# Patient Record
Sex: FEMALE | Race: WHITE | Employment: OTHER | URBAN - METROPOLITAN AREA
[De-identification: names, ages, dates, MRNs, and addresses within clinical notes are randomized per-mention and may not be internally consistent; named-entity substitution may affect disease eponyms.]

---

## 2017-06-05 ENCOUNTER — OFFICE VISIT (OUTPATIENT)
Dept: SURGERY | Facility: CLINIC | Age: 82
End: 2017-06-05
Payer: MEDICARE

## 2017-06-05 VITALS
BODY MASS INDEX: 28.07 KG/M2 | HEART RATE: 87 BPM | DIASTOLIC BLOOD PRESSURE: 64 MMHG | SYSTOLIC BLOOD PRESSURE: 122 MMHG | WEIGHT: 143 LBS | HEIGHT: 60 IN

## 2017-06-05 DIAGNOSIS — K80.50 RECURRENT BILIARY COLIC: Primary | ICD-10-CM

## 2017-06-05 PROCEDURE — 99203 OFFICE O/P NEW LOW 30 MIN: CPT | Performed by: SURGERY

## 2017-06-05 RX ORDER — LOSARTAN POTASSIUM AND HYDROCHLOROTHIAZIDE 12.5; 5 MG/1; MG/1
1 TABLET ORAL EVERY EVENING
Status: ON HOLD | COMMUNITY
End: 2018-01-01

## 2017-06-05 NOTE — MR AVS SNAPSHOT
"              After Visit Summary   2017    Cristal Wynn    MRN: [id number]           Patient Information     Date Of Birth          1935        Visit Information        Provider Department      2017 9:45 AM Tony Crawley MD [facility] General Surgery       Follow-ups after your visit        Who to contact     If you have questions or need follow up information about today's clinic visit or your schedule please contact [facility] directly at [phone].  Normal or non-critical lab and imaging results will be communicated to you by Lumicellhart, letter or phone within 4 business days after the clinic has received the results. If you do not hear from us within 7 days, please contact the clinic through Lumicellhart or phone. If you have a critical or abnormal lab result, we will notify you by phone as soon as possible.  Submit refill requests through Vastrm or call your pharmacy and they will forward the refill request to us. Please allow 3 business days for your refill to be completed.          Additional Information About Your Visit        MyChart Information     Vastrm lets you send messages to your doctor, view your test results, renew your prescriptions, schedule appointments and more. To sign up, go to [email or web address] . Click on \"Log in\" on the left side of the screen, which will take you to the Welcome page. Then click on \"Sign up Now\" on the right side of the page.     You will be asked to enter the access code listed below, as well as some personal information. Please follow the directions to create your username and password.     Your access code is: [email or web address]  Expires: 9/3/2017 10:23 AM     Your access code will  in 90 days. If you need help or a new code, please call your [facility] clinic or [phone].        Care EveryWhere ID     This is your Care EveryWhere ID. This could be used by other organizations to " access your [facility] medical records  [id number]        Your Vitals Were     Pulse Height BMI (Body Mass Index)             87 5' (1.524 m) 27.93 kg/m2          Blood Pressure from Last 3 Encounters:   06/05/17 122/64   12/24/16 109/53   12/09/16 141/82    Weight from Last 3 Encounters:   06/05/17 143 lb (64.9 kg)   12/23/16 140 lb (63.5 kg)   12/09/16 150 lb (68 kg)              Today, you had the following     No orders found for display       Primary Care Provider Office Phone # Fax #    Mora Bang [phone]       [facility] [address]  [facility] [address]        Thank you!     Thank you for choosing [facility]  for your care. Our goal is always to provide you with excellent care. Hearing back from our patients is one way we can continue to improve our services. Please take a few minutes to complete the written survey that you may receive in the mail after your visit with us. Thank you!             Your Updated Medication List - Protect others around you: Learn how to safely use, store and throw away your medicines at www.disposemymeds.org.          This list is accurate as of: 6/5/17 10:23 AM.  Always use your most recent med list.                   Brand Name Dispense Instructions for use    CHILDRENS CHEWABLE MULTI VITS PO      Take 2 tablets by mouth daily (with breakfast)       losartan-hydrochlorothiazide 50-12.5 MG per tablet    HYZAAR     Take 1 tablet by mouth daily       metoprolol 25 MG 24 hr tablet    TOPROL-XL    90 tablet    Take 1 tablet (25 mg) by mouth At Bedtime

## 2017-06-05 NOTE — LETTER
2017      RE:  Cristal Wynn-:  [birth year]    HISTORY OF PRESENT ILLNESS:  Cristal Wynn is a 82 year old female who is seen in consultation at the request of Dr. Bang for evaluation of biliary colic secondary to cholelithiasis.  Now that she has recovered from her diverticular disease and prior to having her orthopedic operation she would like to address her biliary colic. She recalls multiple episodes where oily/fatty meals have brought on abdominal pain, nausea, vomiting, and diarrhea.  We reviewed the ultrasound she had  In the recent past showing gallbladder sludge.     REVIEW OF SYSTEMS:  Constitutional:  Negative for chills, fatigue, fever.  Eyes:  Negative for blurred vision.  ENT:  Negative for hearing problems.  Cardiovascular:  Negative for chest pain, palpitations.  Respiratory:  Negative for cough, dyspnea and hemoptysis.  Gastrointestinal:  See HPI  Musculoskeletal:  Negative for arthralgias, back pain and myalgias.    Vitals: /64  Pulse 87  Ht 5' (1.524 m)  Wt 143 lb (64.9 kg)  BMI 27.93 kg/m2  BMI= Body mass index is 27.93 kg/(m^2).     EXAM:  GENERAL: appears her age, alert and no distress   HEENT: moist mucus membranes, no scleral icterus  CARDIOVASCULAR:  RRR, No JVD  RESPIRATORY: non labored breathing  NECK: Neck supple. No noticeable masses.  ABDOMEN: soft, nontender, nondistended  Extremities: warm and well perfused, walks with evident discomfort to her knee.  SKIN: No suspicious lesions or rashes  LYMPH: Normal cervical lymph nodes     LABS/Imaging: Gallbladder ultrasound reviewed     ASSESSMENT:  Cristal Wynn suffers from Biliary colic secondary to cholelithiasis.     PLAN:  Laparoscopic cholecystectomy  Cristal Wynn understands the risk, benefits, hopeful outcomes, and possible complications, both in the short and in the long term.  All her questions answered, she will like to proceed with the propose procedure in the near future.     It is my pleasure  to participate in the care of Cristal Wynn. Thank you for this consultation.      If you have any questions please give me a call.     Best regards,    Tony Crawley MD

## 2017-06-06 NOTE — PROGRESS NOTES
[facility] General Surgery Clinic Consultation    CHIEF COMPLAINT:  Chief Complaint   Patient presents with     Consult     Gallbladder        HISTORY OF PRESENT ILLNESS:  Cristal Wynn is a 82 year old female who is seen in consultation at the request of Dr. Bang for evaluation of biliary colic secondary to cholelithiasis.  Now that she has recovered from her diverticular disease and prior to having her orthopedic operation she would like to address her biliary colic.  She recalls multiple episodes where oily/fatty meals have brought on abdominal pain, nausea, vomiting, and diarrhea.  We reviewed the ultrasound she had  In the recent past showing gallbladder sludge.    REVIEW OF SYSTEMS:  Constitutional:  Negative for chills, fatigue, fever.  Eyes:  Negative for blurred vision.  ENT:  Negative for hearing problems.  Cardiovascular:  Negative for chest pain, palpitations.  Respiratory:  Negative for cough, dyspnea and hemoptysis.  Gastrointestinal:  See HPI  Musculoskeletal:  Negative for arthralgias, back pain and myalgias.    Past Medical History:   Diagnosis Date     Cataracts, bilateral      Coronary artery disease 11/18/14    mild non obstructive-CTa     Disorder of bone and cartilage, unspecified      Family history of heart disease      Gastro-oesophageal reflux disease      Hyperlipidaemia      Hypertension      Osteoarthrosis, unspecified whether generalized or localized, lower leg     knee     Unspecified vitamin B deficiency        Past Surgical History:   Procedure Laterality Date     COSMETIC SURGERY       GYN SURGERY      Hysterectomy     HEAD & NECK SURGERY       ORTHOPEDIC SURGERY      hip joint replacement     PHACOEMULSIFICATION CLEAR CORNEA WITH STANDARD INTRAOCULAR LENS IMPLANT Right 10/23/2014    Procedure: PHACOEMULSIFICATION CLEAR CORNEA WITH STANDARD INTRAOCULAR LENS IMPLANT;  Surgeon: Ivan Avalos MD;  Location: [facility]     PHACOEMULSIFICATION CLEAR CORNEA WITH STANDARD INTRAOCULAR  LENS IMPLANT Left 10/30/2014    Procedure: PHACOEMULSIFICATION CLEAR CORNEA WITH STANDARD INTRAOCULAR LENS IMPLANT;  Surgeon: Ivan Avalos MD;  Location: [facility]       Family History   Problem Relation Age of Onset     Prostate Cancer Father      Alcohol/Drug Brother      1 brother  ETOH/CHF     Heart Failure Brother      HEART DISEASE Brother      Other - See Comments Sister      lyphoma no recurrence     CANCER Sister      Helicobacter Pylori Brother      Obesity Brother      C.A.D. Sister      CABG/?valve at 80       Social History   Substance Use Topics     Smoking status: Never Smoker     Smokeless tobacco: Not on file     Alcohol use Yes      Comment: wine once a month       Patient Active Problem List   Diagnosis     Nonspecific abnormal electrocardiogram (ECG) (EKG)     Hypertension     Hyperlipidemia     Family history of heart disease     Coronary artery disease     Acute diverticulitis       Allergies   Allergen Reactions     Excedrin Back & [Acetaminophen-Aspirin Buffered]      Abdominal pain       Current Outpatient Prescriptions   Medication Sig Dispense Refill     losartan-hydrochlorothiazide (HYZAAR) 50-12.5 MG per tablet Take 1 tablet by mouth daily       metoprolol (TOPROL-XL) 25 MG 24 hr tablet Take 1 tablet (25 mg) by mouth At Bedtime 90 tablet 3     Pediatric Multiple Vit-C-FA (CHILDRENS CHEWABLE MULTI VITS PO) Take 2 tablets by mouth daily (with breakfast)         Vitals: /64  Pulse 87  Ht 5' (1.524 m)  Wt 143 lb (64.9 kg)  BMI 27.93 kg/m2  BMI= Body mass index is 27.93 kg/(m^2).    EXAM:  GENERAL: appears her age, alert and no distress   HEENT: moist mucus membranes, no scleral icterus  CARDIOVASCULAR:  RRR, No JVD  RESPIRATORY: non labored breathing  NECK: Neck supple. No noticeable masses.  ABDOMEN: soft, nontender, nondistended  Extremities: warm and well perfused, walks with evident discomfort to her knee.  SKIN: No suspicious lesions or rashes  LYMPH: Normal cervical  lymph nodes    LABS/Imaging: Gallbladder ultrasound reviewed    ASSESSMENT:  Cristal Wynn suffers from Biliary colic secondary to cholelithiasis.    PLAN:  Laparoscopic cholecystectomy  Cristal Wynn understands the risk, benefits, hopeful outcomes, and possible complications, both in the short and in the long term.  All her questions answered, she will like to proceed with the propose procedure in the near future.    It is my pleasure to participate in the care of Cristal Wynn. Thank you for this consultation.     If you have any questions please give me a call.    Best regards,  Tony Crawley MD    Please route or send letter to:  Primary Care Provider (PCP), Referring Provider and Include Progress Note    Total time with patient visit: 30 minutes more than half spent in counseling, explanation of procedures and coordination of care.

## 2017-06-23 ENCOUNTER — TRANSFERRED RECORDS (OUTPATIENT)
Dept: HEALTH INFORMATION MANAGEMENT | Facility: CLINIC | Age: 82
End: 2017-06-23

## 2017-06-27 ENCOUNTER — ANESTHESIA EVENT (OUTPATIENT)
Dept: SURGERY | Facility: CLINIC | Age: 82
End: 2017-06-27
Payer: MEDICARE

## 2017-06-27 ENCOUNTER — SURGERY (OUTPATIENT)
Age: 82
End: 2017-06-27

## 2017-06-27 ENCOUNTER — HOSPITAL ENCOUNTER (OUTPATIENT)
Facility: CLINIC | Age: 82
Discharge: HOME | End: 2017-06-27
Attending: SURGERY | Admitting: SURGERY
Payer: MEDICARE

## 2017-06-27 ENCOUNTER — ANESTHESIA (OUTPATIENT)
Dept: SURGERY | Facility: CLINIC | Age: 82
End: 2017-06-27
Payer: MEDICARE

## 2017-06-27 ENCOUNTER — APPOINTMENT (OUTPATIENT)
Dept: SURGERY | Facility: PHYSICIAN GROUP | Age: 82
End: 2017-06-27
Payer: MEDICARE

## 2017-06-27 VITALS
RESPIRATION RATE: 16 BRPM | BODY MASS INDEX: 28.87 KG/M2 | HEIGHT: 60 IN | TEMPERATURE: 99 F | WEIGHT: 147.06 LBS | OXYGEN SATURATION: 97 % | SYSTOLIC BLOOD PRESSURE: 145 MMHG | DIASTOLIC BLOOD PRESSURE: 78 MMHG

## 2017-06-27 DIAGNOSIS — G89.18 POST-OPERATIVE PAIN: Primary | ICD-10-CM

## 2017-06-27 PROCEDURE — 27210995 ZZH RX 272: Performed by: SURGERY

## 2017-06-27 PROCEDURE — 25000132 ZZH RX MED GY IP 250 OP 250 PS 637: Mod: GY | Performed by: SURGERY

## 2017-06-27 PROCEDURE — 37000008 ZZH ANESTHESIA TECHNICAL FEE, 1ST 30 MIN: Performed by: SURGERY

## 2017-06-27 PROCEDURE — A9270 NON-COVERED ITEM OR SERVICE: HCPCS | Mod: GY | Performed by: SURGERY

## 2017-06-27 PROCEDURE — 88304 TISSUE EXAM BY PATHOLOGIST: CPT | Performed by: SURGERY

## 2017-06-27 PROCEDURE — 27210794 ZZH OR GENERAL SUPPLY STERILE: Performed by: SURGERY

## 2017-06-27 PROCEDURE — 71000027 ZZH RECOVERY PHASE 2 EACH 15 MINS: Performed by: SURGERY

## 2017-06-27 PROCEDURE — 25000128 H RX IP 250 OP 636: Performed by: NURSE ANESTHETIST, CERTIFIED REGISTERED

## 2017-06-27 PROCEDURE — 36000058 ZZH SURGERY LEVEL 3 EA 15 ADDTL MIN: Performed by: SURGERY

## 2017-06-27 PROCEDURE — 88304 TISSUE EXAM BY PATHOLOGIST: CPT | Mod: 26 | Performed by: SURGERY

## 2017-06-27 PROCEDURE — 25000125 ZZHC RX 250: Performed by: SURGERY

## 2017-06-27 PROCEDURE — 47562 LAPAROSCOPIC CHOLECYSTECTOMY: CPT | Performed by: SURGERY

## 2017-06-27 PROCEDURE — 71000013 ZZH RECOVERY PHASE 1 LEVEL 1 EA ADDTL HR: Performed by: SURGERY

## 2017-06-27 PROCEDURE — 47562 LAPAROSCOPIC CHOLECYSTECTOMY: CPT | Mod: AS | Performed by: PHYSICIAN ASSISTANT

## 2017-06-27 PROCEDURE — 25000125 ZZHC RX 250: Performed by: NURSE ANESTHETIST, CERTIFIED REGISTERED

## 2017-06-27 PROCEDURE — 25000128 H RX IP 250 OP 636: Performed by: ANESTHESIOLOGY

## 2017-06-27 PROCEDURE — 25800025 ZZH RX 258: Performed by: SURGERY

## 2017-06-27 PROCEDURE — 37000009 ZZH ANESTHESIA TECHNICAL FEE, EACH ADDTL 15 MIN: Performed by: SURGERY

## 2017-06-27 PROCEDURE — 40000170 ZZH STATISTIC PRE-PROCEDURE ASSESSMENT II: Performed by: SURGERY

## 2017-06-27 PROCEDURE — 71000012 ZZH RECOVERY PHASE 1 LEVEL 1 FIRST HR: Performed by: SURGERY

## 2017-06-27 PROCEDURE — 36000056 ZZH SURGERY LEVEL 3 1ST 30 MIN: Performed by: SURGERY

## 2017-06-27 RX ORDER — MEPERIDINE HYDROCHLORIDE 25 MG/ML
12.5 INJECTION INTRAMUSCULAR; INTRAVENOUS; SUBCUTANEOUS
Status: DISCONTINUED | OUTPATIENT
Start: 2017-06-27 | End: 2017-06-27 | Stop reason: HOSPADM

## 2017-06-27 RX ORDER — BUPIVACAINE HYDROCHLORIDE AND EPINEPHRINE 2.5; 5 MG/ML; UG/ML
INJECTION, SOLUTION EPIDURAL; INFILTRATION; INTRACAUDAL; PERINEURAL
Status: DISCONTINUED
Start: 2017-06-27 | End: 2017-06-27 | Stop reason: HOSPADM

## 2017-06-27 RX ORDER — DEXAMETHASONE SODIUM PHOSPHATE 4 MG/ML
INJECTION, SOLUTION INTRA-ARTICULAR; INTRALESIONAL; INTRAMUSCULAR; INTRAVENOUS; SOFT TISSUE PRN
Status: DISCONTINUED | OUTPATIENT
Start: 2017-06-27 | End: 2017-06-27

## 2017-06-27 RX ORDER — ONDANSETRON 4 MG/1
4 TABLET, ORALLY DISINTEGRATING ORAL EVERY 30 MIN PRN
Status: DISCONTINUED | OUTPATIENT
Start: 2017-06-27 | End: 2017-06-27 | Stop reason: HOSPADM

## 2017-06-27 RX ORDER — PROPOFOL 10 MG/ML
INJECTION, EMULSION INTRAVENOUS CONTINUOUS PRN
Status: DISCONTINUED | OUTPATIENT
Start: 2017-06-27 | End: 2017-06-27

## 2017-06-27 RX ORDER — NALOXONE HYDROCHLORIDE 0.4 MG/ML
.1-.4 INJECTION, SOLUTION INTRAMUSCULAR; INTRAVENOUS; SUBCUTANEOUS
Status: DISCONTINUED | OUTPATIENT
Start: 2017-06-27 | End: 2017-06-27 | Stop reason: HOSPADM

## 2017-06-27 RX ORDER — SODIUM CHLORIDE, SODIUM LACTATE, POTASSIUM CHLORIDE, CALCIUM CHLORIDE 600; 310; 30; 20 MG/100ML; MG/100ML; MG/100ML; MG/100ML
INJECTION, SOLUTION INTRAVENOUS CONTINUOUS PRN
Status: DISCONTINUED | OUTPATIENT
Start: 2017-06-27 | End: 2017-06-27

## 2017-06-27 RX ORDER — ONDANSETRON 2 MG/ML
INJECTION INTRAMUSCULAR; INTRAVENOUS PRN
Status: DISCONTINUED | OUTPATIENT
Start: 2017-06-27 | End: 2017-06-27

## 2017-06-27 RX ORDER — CEFAZOLIN SODIUM 2 G/100ML
2 INJECTION, SOLUTION INTRAVENOUS
Status: DISCONTINUED | OUTPATIENT
Start: 2017-06-27 | End: 2017-06-27 | Stop reason: HOSPADM

## 2017-06-27 RX ORDER — HYDROCODONE BITARTRATE AND ACETAMINOPHEN 5; 325 MG/1; MG/1
1-2 TABLET ORAL
Status: DISCONTINUED | OUTPATIENT
Start: 2017-06-27 | End: 2017-06-27 | Stop reason: HOSPADM

## 2017-06-27 RX ORDER — LIDOCAINE HYDROCHLORIDE 10 MG/ML
INJECTION, SOLUTION INFILTRATION; PERINEURAL
Status: DISCONTINUED
Start: 2017-06-27 | End: 2017-06-27 | Stop reason: HOSPADM

## 2017-06-27 RX ORDER — FENTANYL CITRATE 50 UG/ML
INJECTION, SOLUTION INTRAMUSCULAR; INTRAVENOUS PRN
Status: DISCONTINUED | OUTPATIENT
Start: 2017-06-27 | End: 2017-06-27

## 2017-06-27 RX ORDER — NEOSTIGMINE METHYLSULFATE 1 MG/ML
VIAL (ML) INJECTION PRN
Status: DISCONTINUED | OUTPATIENT
Start: 2017-06-27 | End: 2017-06-27

## 2017-06-27 RX ORDER — FENTANYL CITRATE 50 UG/ML
25-50 INJECTION, SOLUTION INTRAMUSCULAR; INTRAVENOUS EVERY 5 MIN PRN
Status: DISCONTINUED | OUTPATIENT
Start: 2017-06-27 | End: 2017-06-27 | Stop reason: HOSPADM

## 2017-06-27 RX ORDER — HYDROCODONE BITARTRATE AND ACETAMINOPHEN 5; 325 MG/1; MG/1
1 TABLET ORAL ONCE
Status: COMPLETED | OUTPATIENT
Start: 2017-06-27 | End: 2017-06-27

## 2017-06-27 RX ORDER — HYDROCODONE BITARTRATE AND ACETAMINOPHEN 5; 325 MG/1; MG/1
1-2 TABLET ORAL EVERY 4 HOURS PRN
Qty: 30 TABLET | Refills: 0 | Status: ON HOLD | OUTPATIENT
Start: 2017-06-27 | End: 2017-09-14

## 2017-06-27 RX ORDER — MAGNESIUM HYDROXIDE 1200 MG/15ML
LIQUID ORAL PRN
Status: DISCONTINUED | OUTPATIENT
Start: 2017-06-27 | End: 2017-06-27 | Stop reason: HOSPADM

## 2017-06-27 RX ORDER — GLYCOPYRROLATE 0.2 MG/ML
INJECTION, SOLUTION INTRAMUSCULAR; INTRAVENOUS PRN
Status: DISCONTINUED | OUTPATIENT
Start: 2017-06-27 | End: 2017-06-27

## 2017-06-27 RX ORDER — SODIUM CHLORIDE, SODIUM LACTATE, POTASSIUM CHLORIDE, CALCIUM CHLORIDE 600; 310; 30; 20 MG/100ML; MG/100ML; MG/100ML; MG/100ML
INJECTION, SOLUTION INTRAVENOUS CONTINUOUS
Status: DISCONTINUED | OUTPATIENT
Start: 2017-06-27 | End: 2017-06-27 | Stop reason: HOSPADM

## 2017-06-27 RX ORDER — PROPOFOL 10 MG/ML
INJECTION, EMULSION INTRAVENOUS PRN
Status: DISCONTINUED | OUTPATIENT
Start: 2017-06-27 | End: 2017-06-27

## 2017-06-27 RX ORDER — ONDANSETRON 2 MG/ML
4 INJECTION INTRAMUSCULAR; INTRAVENOUS EVERY 30 MIN PRN
Status: DISCONTINUED | OUTPATIENT
Start: 2017-06-27 | End: 2017-06-27 | Stop reason: HOSPADM

## 2017-06-27 RX ORDER — FENTANYL CITRATE 50 UG/ML
25-50 INJECTION, SOLUTION INTRAMUSCULAR; INTRAVENOUS
Status: DISCONTINUED | OUTPATIENT
Start: 2017-06-27 | End: 2017-06-27 | Stop reason: HOSPADM

## 2017-06-27 RX ORDER — LIDOCAINE HYDROCHLORIDE 20 MG/ML
INJECTION, SOLUTION INFILTRATION; PERINEURAL PRN
Status: DISCONTINUED | OUTPATIENT
Start: 2017-06-27 | End: 2017-06-27

## 2017-06-27 RX ORDER — CEFAZOLIN SODIUM 1 G/3ML
1 INJECTION, POWDER, FOR SOLUTION INTRAMUSCULAR; INTRAVENOUS SEE ADMIN INSTRUCTIONS
Status: DISCONTINUED | OUTPATIENT
Start: 2017-06-27 | End: 2017-06-27 | Stop reason: HOSPADM

## 2017-06-27 RX ADMIN — ONDANSETRON 4 MG: 2 INJECTION INTRAMUSCULAR; INTRAVENOUS at 12:15

## 2017-06-27 RX ADMIN — PHENYLEPHRINE HYDROCHLORIDE 150 MCG: 10 INJECTION, SOLUTION INTRAMUSCULAR; INTRAVENOUS; SUBCUTANEOUS at 11:48

## 2017-06-27 RX ADMIN — SODIUM CHLORIDE 1000 ML: 0.9 IRRIGANT IRRIGATION at 12:19

## 2017-06-27 RX ADMIN — FENTANYL CITRATE 25 MCG: 50 INJECTION, SOLUTION INTRAMUSCULAR; INTRAVENOUS at 12:41

## 2017-06-27 RX ADMIN — LIDOCAINE HYDROCHLORIDE 30 ML: 10 INJECTION, SOLUTION INFILTRATION; PERINEURAL at 11:51

## 2017-06-27 RX ADMIN — MIDAZOLAM HYDROCHLORIDE 1 MG: 1 INJECTION, SOLUTION INTRAMUSCULAR; INTRAVENOUS at 11:27

## 2017-06-27 RX ADMIN — FENTANYL CITRATE 50 MCG: 50 INJECTION, SOLUTION INTRAMUSCULAR; INTRAVENOUS at 11:29

## 2017-06-27 RX ADMIN — ROCURONIUM BROMIDE 25 MG: 10 INJECTION INTRAVENOUS at 11:29

## 2017-06-27 RX ADMIN — SODIUM CHLORIDE, POTASSIUM CHLORIDE, SODIUM LACTATE AND CALCIUM CHLORIDE: 600; 310; 30; 20 INJECTION, SOLUTION INTRAVENOUS at 11:27

## 2017-06-27 RX ADMIN — ROCURONIUM BROMIDE 25 MG: 10 INJECTION INTRAVENOUS at 11:52

## 2017-06-27 RX ADMIN — HYDROCODONE BITARTRATE AND ACETAMINOPHEN 1 TABLET: 5; 325 TABLET ORAL at 13:59

## 2017-06-27 RX ADMIN — PHENYLEPHRINE HYDROCHLORIDE 150 MCG: 10 INJECTION, SOLUTION INTRAMUSCULAR; INTRAVENOUS; SUBCUTANEOUS at 11:58

## 2017-06-27 RX ADMIN — NEOSTIGMINE METHYLSULFATE 3 MG: 1 INJECTION INTRAMUSCULAR; INTRAVENOUS; SUBCUTANEOUS at 12:18

## 2017-06-27 RX ADMIN — SODIUM CHLORIDE 300 ML: 900 IRRIGANT IRRIGATION at 12:10

## 2017-06-27 RX ADMIN — PROPOFOL 200 MCG/KG/MIN: 10 INJECTION, EMULSION INTRAVENOUS at 11:29

## 2017-06-27 RX ADMIN — DEXAMETHASONE SODIUM PHOSPHATE 4 MG: 4 INJECTION, SOLUTION INTRA-ARTICULAR; INTRALESIONAL; INTRAMUSCULAR; INTRAVENOUS; SOFT TISSUE at 11:41

## 2017-06-27 RX ADMIN — FENTANYL CITRATE 25 MCG: 50 INJECTION, SOLUTION INTRAMUSCULAR; INTRAVENOUS at 12:37

## 2017-06-27 RX ADMIN — FENTANYL CITRATE 50 MCG: 50 INJECTION, SOLUTION INTRAMUSCULAR; INTRAVENOUS at 12:48

## 2017-06-27 RX ADMIN — LIDOCAINE HYDROCHLORIDE 60 MG: 20 INJECTION, SOLUTION INFILTRATION; PERINEURAL at 11:29

## 2017-06-27 RX ADMIN — PROPOFOL 150 MG: 10 INJECTION, EMULSION INTRAVENOUS at 11:29

## 2017-06-27 RX ADMIN — FENTANYL CITRATE 50 MCG: 50 INJECTION, SOLUTION INTRAMUSCULAR; INTRAVENOUS at 11:35

## 2017-06-27 RX ADMIN — PHENYLEPHRINE HYDROCHLORIDE 150 MCG: 10 INJECTION, SOLUTION INTRAMUSCULAR; INTRAVENOUS; SUBCUTANEOUS at 11:42

## 2017-06-27 RX ADMIN — GLYCOPYRROLATE 0.6 MG: 0.2 INJECTION, SOLUTION INTRAMUSCULAR; INTRAVENOUS at 12:18

## 2017-06-27 NOTE — LETTER
[facility] SAME DAY SURGERY  [address] [place] [address]  [phone]          June 27, 2017    RE:  Cristal Wynn                                                                                                                                                                 To whom it may concern:    Cristal Wynn underwent surgery at [facility] on 6/27/17. Please excuse her from all work-related activities for at least 4 weeks. She should be able to return to work on Wednesday 7/26/17. If she will need further time off from work we would be happy to provide you with another letter.      Sincerely,            KAVIN Kennedy MD  [facility]  [phone]

## 2017-06-27 NOTE — ANESTHESIA CARE TRANSFER NOTE
Patient: Cristal Wynn    Procedure(s):  LAPAROSCOPIC CHOLECYSTECTOMY - Wound Class: II-Clean Contaminated    Diagnosis: BILIARY COLIC  Diagnosis Additional Information: No value filed.    Anesthesia Type:   General, ETT     Note:  Airway :Face Mask  Patient transferred to:PACU  Comments: Neuromuscular blockade reversed after TOF 2/4, spontaneous respirations, adequate tidal volumes, followed commands to voice, extubated atraumatically, extubated with suction, airway patent after extubation.  Oxygen via facemask at 6 liters per minute to PACU. Oxygen tubing connected to wall O2 in PACU, SpO2, NiBP, and EKG monitors and alarms on and functioning, Harry Hugger warmer connected to patient gown, report on patient's clinical status given to PACU RN , RN questions answered.       Vitals: (Last set prior to Anesthesia Care Transfer)    CRNA VITALS  6/27/2017 1155 - 6/27/2017 1229      6/27/2017             NIBP: (!)  164/95    Pulse: 90    NIBP Mean: 137    SpO2: 97 %    Resp Rate (set): 10                Electronically Signed By: YARELY Hilario CRNA  June 27, 2017  12:29 PM

## 2017-06-27 NOTE — OP NOTE
Surgeon: Phil Weber MD  1st Assistant: Brianna Mohamud PA-C, The physicians assistant was medically necessary for their expertise in camera management, suctioning, suturing, and retraction.  PREOPERATIVE DIAGNOSIS: chronic cholecystitis.   POSTOPERATIVE DIAGNOSES: Same  PROCEDURE: Laparoscopic cholecystectomy.   ANESTHESIA: General.   ESTIMATED BLOOD LOSS: Less than 5 mL.   OPERATIVE PROCEDURE: After induction of general endotracheal anesthesia, Cristal Montes Wynn abdomen was prepped and draped in the usual sterile fashion. With the Mohit technique in an periumbilical location, the abdomen was entered and pneumoperitoneum was established. Three additional 5 mm trocars were placed along the right hypochondrium. Some adhesions from the omentum to the gallbladder were taken down with cautery and blunt dissection.  The gallbladder fundus was retracted cephalad and lateral and the infundibulum was retracted caudad and lateral. The peritoneum investing the triangle of Calot was incised with electrocautery and dissected bluntly. The critical view of a single cystic duct, single cystic artery was achieved and both structures were doubly clipped on the patient's side, singly clipped on the gallbladder side and transected sharply. The gallbladder was detached from the liver with electrocautery and blunt dissection. The specimen was removed from the patient's abdomen and sent to pathology. The gallbladder fossa was inspected. Hemostasis was secured, and no evidence of bile leakage noted. The abdomen was surveyed and no other pathology seen. The trocars were then removed under direct visualization without evidence of bleeding. Pneumoperitoneum was released, and the fascia of the periumbilical port was closed with multiple interrupted 0 Vicryl suture. Skin was approximated with 4-0 Monocryl. Steri-Strips and sterile dressing applied. No immediate complications.   PHIL WEBER MD

## 2017-06-27 NOTE — DISCHARGE INSTRUCTIONS
[facility]   Discharge Instructions for Laparoscopic Cholecystectomy              ACTIVITY    You may climb stairs.    You may lift or exercise after four days if comfortable.    You may drive without restrictions when not using prescription pain medication.    You may return to work in four days when comfortable.    BATHING    You may take a tub bath or shower.      You may have steri-strips (looks like tape) on your incision.  They will fall off by themselves.     DIET    Return to the diet you were on before surgery.    CALL YOUR PHYSICIAN IF YOU HAVE    Chills or fever above 101   F.    Drainage from the incisions    Significant bleeding    Pain not relieved by your pain medication or rest.    Increasing pain after the first 48 hours    HELPFUL HINTS    It is not uncommon to experience some loose stools or diarrhea after surgery.  This is your body s way of adapting to the bile which will slowly drain into your intestine.    You may experience shoulder pain which is due to the air injected during the procedure.  This is temporary and usually passes in a day.                   Same Day Surgery Discharge Instructions for  Sedation and General Anesthesia       It's not unusual to feel dizzy, light-headed or faint for up to 24 hours after surgery or while taking pain medication.  If you have these symptoms: sit for a few minutes before standing and have someone assist you when you get up to walk or use the bathroom.      You should rest and relax for the next 24 hours. We recommend you make arrangements to have an adult stay with you for at least 24 hours after your discharge.  Avoid hazardous and strenuous activity.      DO NOT DRIVE any vehicle or operate mechanical equipment for 24 hours following the end of your surgery.  Even though you may feel normal, your reactions may be affected by the medication you have received.      Do not drink alcoholic beverages for 24 hours following surgery.        Slowly progress to your regular diet as you feel able. It's not unusual to feel nauseated and/or vomit after receiving anesthesia.  If you develop these symptoms, drink clear liquids (apple juice, ginger ale, broth, 7-up, etc. ) until you feel better.  If your nausea and vomiting persists for 24 hours, please notify your surgeon.        All narcotic pain medications, along with inactivity and anesthesia, can cause constipation. Drinking plenty of liquids and increasing fiber intake will help.      For any questions of a medical nature, call your surgeon.      Do not make important decisions for 24 hours.      If you had general anesthesia, you may have a sore throat for a couple of days related to the breathing tube used during surgery.  You may use Cepacol lozenges to help with this discomfort.  If it worsens or if you develop a fever, contact your surgeon.       If you feel your pain is not well managed with the pain medications prescribed by your surgeon, please contact your surgeon's office to let them know so they can address your concerns.       **If you have questions or concerns about your procedure,  call Dr. Crawley at [phone]**

## 2017-06-27 NOTE — IP AVS SNAPSHOT
[facility] Same Day Surgery    [address]    [place] [address]    Phone:  [phone]    Fax:  [phone]                                       After Visit Summary   6/27/2017    Cristal Wynn    MRN: [id number]           After Visit Summary Signature Page     I have received my discharge instructions, and my questions have been answered. I have discussed any challenges I see with this plan with the nurse or doctor.    ..........................................................................................................................................  Patient/Patient Representative Signature      ..........................................................................................................................................  Patient Representative Print Name and Relationship to Patient    ..................................................               ................................................  Date                                            Time    ..........................................................................................................................................  Reviewed by Signature/Title    ...................................................              ..............................................  Date                                                            Time

## 2017-06-27 NOTE — IP AVS SNAPSHOT
MRN:[id number]                      After Visit Summary   6/27/2017    Cristal yWnn    MRN: [id number]           Thank you!     Thank you for choosing [facility] for your care. Our goal is always to provide you with excellent care. Hearing back from our patients is one way we can continue to improve our services. Please take a few minutes to complete the written survey that you may receive in the mail after you visit with us. Thank you!        Patient Information     Date Of Birth          1935        About your hospital stay     You were admitted on:  June 27, 2017 You last received care in the:  [facility]    You were discharged on:  June 27, 2017       Who to Call     For medical emergencies, please call 911.  For non-urgent questions about your medical care, please call your primary care provider or clinic, [phone]  For questions related to your surgery, please call your surgery clinic        Attending Provider     Provider Specialty    Tony Crawley MD Surgery       Primary Care Provider Office Phone # Fax #    Mora Bang [phone]      After Care Instructions     Discharge Instructions       Follow up with Dr. Crawley or PA, at [address] [facility] [address] [address], within 2 weeks, call office for appointment at [phone].    IF you are doing well and have no questions or concerns, you may call our nurse to update on your condition instead of coming in for appointment.                  Further instructions from your care team       [facility]   Discharge Instructions for Laparoscopic Cholecystectomy              ACTIVITY    You may climb stairs.    You may lift or exercise after four days if comfortable.    You may drive without restrictions when not using prescription pain medication.    You may return to work in four days when comfortable.    BATHING    You may take a tub bath or shower.      You may have  steri-strips (looks like tape) on your incision.  They will fall off by themselves.     DIET    Return to the diet you were on before surgery.    CALL YOUR PHYSICIAN IF YOU HAVE    Chills or fever above 101   F.    Drainage from the incisions    Significant bleeding    Pain not relieved by your pain medication or rest.    Increasing pain after the first 48 hours    HELPFUL HINTS    It is not uncommon to experience some loose stools or diarrhea after surgery.  This is your body s way of adapting to the bile which will slowly drain into your intestine.    You may experience shoulder pain which is due to the air injected during the procedure.  This is temporary and usually passes in a day.                   Same Day Surgery Discharge Instructions for  Sedation and General Anesthesia       It's not unusual to feel dizzy, light-headed or faint for up to 24 hours after surgery or while taking pain medication.  If you have these symptoms: sit for a few minutes before standing and have someone assist you when you get up to walk or use the bathroom.      You should rest and relax for the next 24 hours. We recommend you make arrangements to have an adult stay with you for at least 24 hours after your discharge.  Avoid hazardous and strenuous activity.      DO NOT DRIVE any vehicle or operate mechanical equipment for 24 hours following the end of your surgery.  Even though you may feel normal, your reactions may be affected by the medication you have received.      Do not drink alcoholic beverages for 24 hours following surgery.       Slowly progress to your regular diet as you feel able. It's not unusual to feel nauseated and/or vomit after receiving anesthesia.  If you develop these symptoms, drink clear liquids (apple juice, ginger ale, broth, 7-up, etc. ) until you feel better.  If your nausea and vomiting persists for 24 hours, please notify your surgeon.        All narcotic pain medications, along with inactivity and  "anesthesia, can cause constipation. Drinking plenty of liquids and increasing fiber intake will help.      For any questions of a medical nature, call your surgeon.      Do not make important decisions for 24 hours.      If you had general anesthesia, you may have a sore throat for a couple of days related to the breathing tube used during surgery.  You may use Cepacol lozenges to help with this discomfort.  If it worsens or if you develop a fever, contact your surgeon.       If you feel your pain is not well managed with the pain medications prescribed by your surgeon, please contact your surgeon's office to let them know so they can address your concerns.       **If you have questions or concerns about your procedure,  call Dr. Crawley at [phone]**    Pending Results     Date and Time Order Name Status Description    2017 1207 Surgical pathology exam In process             Admission Information     Date & Time Provider Department Dept. Phone    2017 Tony Crawley MD [facility] Same Day Surgery [phone]      Your Vitals Were     Blood Pressure Temperature Respirations Height Weight Pulse Oximetry    133/78 99  F (37.2  C) (Temporal) 16 1.524 m (5') 66.7 kg (147 lb 1 oz) 93%    BMI (Body Mass Index)                   28.72 kg/m2           [employer] Information     [facility] [employer] lets you send messages to your doctor, view your test results, renew your prescriptions, schedule appointments and more. To sign up, go to [email or web address] . Click on \"Log in\" on the left side of the screen, which will take you to the Welcome page. Then click on \"Sign up Now\" on the right side of the page.     You will be asked to enter the access code listed below, as well as some personal information. Please follow the directions to create your username and password.     Your access code is: [email or web address]  Expires: 9/3/2017 10:23 AM     Your access code will  in 90 days. If you need help or a new code, please " call your [facility] clinic or [phone].        Care EveryWhere ID     This is your Care EveryWhere ID. This could be used by other organizations to access your [facility] medical records  [id number]        Equal Access to Services     RUDDY ELDRIDGE: Hadii aad ku hadvalerio Sooliverioali, waaxda luqadaha, qaybta kaalmada adeegyada, ivette gonzaleztrish stormerni jules jia eldridge. So [facility] [phone].    ATENCIÓN: Si habla español, tiene a birch disposición servicios gratuitos de asistencia lingüística. Llame al [phone].    We comply with applicable federal civil rights laws and [place] laws. We do not discriminate on the basis of race, color, national origin, age, disability sex, sexual orientation or gender identity.               Review of your medicines      START taking        Dose / Directions    HYDROcodone-acetaminophen 5-325 MG per tablet   Commonly known as:  NORCO   Used for:  Post-operative pain        Dose:  1-2 tablet   Take 1-2 tablets by mouth every 4 hours as needed for other (Moderate to Severe Pain)   Quantity:  30 tablet   Refills:  0         CONTINUE these medicines which have NOT CHANGED        Dose / Directions    losartan-hydrochlorothiazide 50-12.5 MG per tablet   Commonly known as:  HYZAAR        Dose:  1 tablet   Take 1 tablet by mouth daily   Refills:  0       metoprolol 25 MG 24 hr tablet   Commonly known as:  TOPROL-XL   Used for:  Hypertension        Dose:  25 mg   Take 1 tablet (25 mg) by mouth At Bedtime   Quantity:  90 tablet   Refills:  3            Where to get your medicines      Some of these will need a paper prescription and others can be bought over the counter. Ask your nurse if you have questions.     Bring a paper prescription for each of these medications     HYDROcodone-acetaminophen 5-325 MG per tablet                Protect others around you: Learn how to safely use, store and throw away your medicines at www.disposemymeds.org.             Medication List: This is a list of  all your medications and when to take them. Check marks below indicate your daily home schedule. Keep this list as a reference.      Medications           Morning Afternoon Evening Bedtime As Needed    HYDROcodone-acetaminophen 5-325 MG per tablet   Commonly known as:  NORCO   Take 1-2 tablets by mouth every 4 hours as needed for other (Moderate to Severe Pain)   Last time this was given:  1 tablet on 6/27/2017  1:59 PM                                losartan-hydrochlorothiazide 50-12.5 MG per tablet   Commonly known as:  HYZAAR   Take 1 tablet by mouth daily                                metoprolol 25 MG 24 hr tablet   Commonly known as:  TOPROL-XL   Take 1 tablet (25 mg) by mouth At Bedtime

## 2017-06-27 NOTE — ANESTHESIA POSTPROCEDURE EVALUATION
Patient: Cristal Wynn    Procedure(s):  LAPAROSCOPIC CHOLECYSTECTOMY - Wound Class: II-Clean Contaminated    Diagnosis:BILIARY COLIC  Diagnosis Additional Information: No value filed.    Anesthesia Type:  General, ETT    Note:  Anesthesia Post Evaluation    Patient location during evaluation: PACU  Patient participation: Able to fully participate in evaluation  Level of consciousness: awake  Pain management: adequate  Airway patency: patent  Cardiovascular status: acceptable  Respiratory status: acceptable  Hydration status: acceptable  PONV: none     Anesthetic complications: None          Last vitals:  Vitals:    06/27/17 1230 06/27/17 1245 06/27/17 1300   BP: 143/71 151/74 138/70   Resp: 14 18 12   Temp: 36.3  C (97.4  F)     SpO2: 100% 100% 100%         Electronically Signed By: Nadir Macario MD  June 27, 2017  1:08 PM
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
## 2017-06-28 LAB — COPATH REPORT: NORMAL

## 2017-08-28 ENCOUNTER — HOSPITAL ENCOUNTER (OUTPATIENT)
Dept: LAB | Facility: CLINIC | Age: 82
End: 2017-08-28
Attending: ORTHOPAEDIC SURGERY
Payer: MEDICARE

## 2017-09-14 ENCOUNTER — APPOINTMENT (OUTPATIENT)
Dept: GENERAL RADIOLOGY | Facility: CLINIC | Age: 82
DRG: 470 | End: 2017-09-14
Attending: ORTHOPAEDIC SURGERY
Payer: MEDICARE

## 2017-09-14 ENCOUNTER — ANESTHESIA (OUTPATIENT)
Dept: SURGERY | Facility: CLINIC | Age: 82
DRG: 470 | End: 2017-09-14
Payer: MEDICARE

## 2017-09-14 ENCOUNTER — HOSPITAL ENCOUNTER (INPATIENT)
Facility: CLINIC | Age: 82
LOS: 3 days | Discharge: HOME HEALTH | DRG: 470 | End: 2017-09-17
Attending: ORTHOPAEDIC SURGERY | Admitting: ORTHOPAEDIC SURGERY
Payer: MEDICARE

## 2017-09-14 ENCOUNTER — ANESTHESIA EVENT (OUTPATIENT)
Dept: SURGERY | Facility: CLINIC | Age: 82
DRG: 470 | End: 2017-09-14
Payer: MEDICARE

## 2017-09-14 DIAGNOSIS — Z96.651 STATUS POST TOTAL RIGHT KNEE REPLACEMENT: ICD-10-CM

## 2017-09-14 DIAGNOSIS — M17.11 ARTHRITIS OF KNEE, RIGHT: Primary | ICD-10-CM

## 2017-09-14 LAB
ABO + RH BLD: NORMAL
ABO + RH BLD: NORMAL
BLD GP AB SCN SERPL QL: NORMAL
BLOOD BANK CMNT PATIENT-IMP: NORMAL
CREAT SERPL-MCNC: 0.83 MG/DL (ref 0.52–1.04)
GFR SERPL CREATININE-BSD FRML MDRD: 66 ML/MIN/1.7M2
HGB BLD-MCNC: 12.2 G/DL (ref 11.7–15.7)
PLATELET # BLD AUTO: 310 10E9/L (ref 150–450)
POTASSIUM SERPL-SCNC: 4.3 MMOL/L (ref 3.4–5.3)
SPECIMEN EXP DATE BLD: NORMAL

## 2017-09-14 PROCEDURE — 25000128 H RX IP 250 OP 636: Performed by: ANESTHESIOLOGY

## 2017-09-14 PROCEDURE — 40000986 XR KNEE PORT RT 1/2 VW: Mod: RT

## 2017-09-14 PROCEDURE — A9270 NON-COVERED ITEM OR SERVICE: HCPCS | Mod: GY | Performed by: ANESTHESIOLOGY

## 2017-09-14 PROCEDURE — A9270 NON-COVERED ITEM OR SERVICE: HCPCS | Mod: GY | Performed by: PHYSICIAN ASSISTANT

## 2017-09-14 PROCEDURE — 99207 ZZC NO CHARGE VISIT/PATIENT NOT SEEN: CPT | Performed by: PHYSICIAN ASSISTANT

## 2017-09-14 PROCEDURE — 27211024 ZZHC OR SUPPLY OTHER OPNP: Performed by: ORTHOPAEDIC SURGERY

## 2017-09-14 PROCEDURE — 25000128 H RX IP 250 OP 636: Performed by: PHYSICIAN ASSISTANT

## 2017-09-14 PROCEDURE — 40000170 ZZH STATISTIC PRE-PROCEDURE ASSESSMENT II: Performed by: ORTHOPAEDIC SURGERY

## 2017-09-14 PROCEDURE — 86850 RBC ANTIBODY SCREEN: CPT | Performed by: PHYSICIAN ASSISTANT

## 2017-09-14 PROCEDURE — 25000125 ZZHC RX 250: Performed by: ORTHOPAEDIC SURGERY

## 2017-09-14 PROCEDURE — 12000007 ZZH R&B INTERMEDIATE

## 2017-09-14 PROCEDURE — 25000125 ZZHC RX 250

## 2017-09-14 PROCEDURE — 27210794 ZZH OR GENERAL SUPPLY STERILE: Performed by: ORTHOPAEDIC SURGERY

## 2017-09-14 PROCEDURE — 27110028 ZZH OR GENERAL SUPPLY NON-STERILE: Performed by: ORTHOPAEDIC SURGERY

## 2017-09-14 PROCEDURE — 86901 BLOOD TYPING SEROLOGIC RH(D): CPT | Performed by: PHYSICIAN ASSISTANT

## 2017-09-14 PROCEDURE — 0SRC0J9 REPLACEMENT OF RIGHT KNEE JOINT WITH SYNTHETIC SUBSTITUTE, CEMENTED, OPEN APPROACH: ICD-10-PCS | Performed by: ORTHOPAEDIC SURGERY

## 2017-09-14 PROCEDURE — 25000132 ZZH RX MED GY IP 250 OP 250 PS 637: Mod: GY | Performed by: ORTHOPAEDIC SURGERY

## 2017-09-14 PROCEDURE — 37000009 ZZH ANESTHESIA TECHNICAL FEE, EACH ADDTL 15 MIN: Performed by: ORTHOPAEDIC SURGERY

## 2017-09-14 PROCEDURE — 25000128 H RX IP 250 OP 636

## 2017-09-14 PROCEDURE — 27210995 ZZH RX 272: Performed by: ORTHOPAEDIC SURGERY

## 2017-09-14 PROCEDURE — 85018 HEMOGLOBIN: CPT | Performed by: PHYSICIAN ASSISTANT

## 2017-09-14 PROCEDURE — 25000125 ZZHC RX 250: Performed by: PHYSICIAN ASSISTANT

## 2017-09-14 PROCEDURE — C1776 JOINT DEVICE (IMPLANTABLE): HCPCS | Performed by: ORTHOPAEDIC SURGERY

## 2017-09-14 PROCEDURE — A9270 NON-COVERED ITEM OR SERVICE: HCPCS | Mod: GY | Performed by: ORTHOPAEDIC SURGERY

## 2017-09-14 PROCEDURE — 84132 ASSAY OF SERUM POTASSIUM: CPT | Performed by: PHYSICIAN ASSISTANT

## 2017-09-14 PROCEDURE — 25800025 ZZH RX 258: Performed by: ORTHOPAEDIC SURGERY

## 2017-09-14 PROCEDURE — 25000128 H RX IP 250 OP 636: Performed by: ORTHOPAEDIC SURGERY

## 2017-09-14 PROCEDURE — 25000125 ZZHC RX 250: Performed by: ANESTHESIOLOGY

## 2017-09-14 PROCEDURE — 86900 BLOOD TYPING SEROLOGIC ABO: CPT | Performed by: PHYSICIAN ASSISTANT

## 2017-09-14 PROCEDURE — 71000012 ZZH RECOVERY PHASE 1 LEVEL 1 FIRST HR: Performed by: ORTHOPAEDIC SURGERY

## 2017-09-14 PROCEDURE — S0020 INJECTION, BUPIVICAINE HYDRO: HCPCS | Performed by: ANESTHESIOLOGY

## 2017-09-14 PROCEDURE — 82565 ASSAY OF CREATININE: CPT | Performed by: PHYSICIAN ASSISTANT

## 2017-09-14 PROCEDURE — 36000063 ZZH SURGERY LEVEL 4 EA 15 ADDTL MIN: Performed by: ORTHOPAEDIC SURGERY

## 2017-09-14 PROCEDURE — 25000132 ZZH RX MED GY IP 250 OP 250 PS 637: Mod: GY | Performed by: ANESTHESIOLOGY

## 2017-09-14 PROCEDURE — 36415 COLL VENOUS BLD VENIPUNCTURE: CPT | Performed by: PHYSICIAN ASSISTANT

## 2017-09-14 PROCEDURE — 85049 AUTOMATED PLATELET COUNT: CPT | Performed by: PHYSICIAN ASSISTANT

## 2017-09-14 PROCEDURE — 36000093 ZZH SURGERY LEVEL 4 1ST 30 MIN: Performed by: ORTHOPAEDIC SURGERY

## 2017-09-14 PROCEDURE — 25000132 ZZH RX MED GY IP 250 OP 250 PS 637: Mod: GY | Performed by: PHYSICIAN ASSISTANT

## 2017-09-14 PROCEDURE — 27810169 ZZH OR IMPLANT GENERAL: Performed by: ORTHOPAEDIC SURGERY

## 2017-09-14 PROCEDURE — 37000008 ZZH ANESTHESIA TECHNICAL FEE, 1ST 30 MIN: Performed by: ORTHOPAEDIC SURGERY

## 2017-09-14 DEVICE — IMPLANTABLE DEVICE: Type: IMPLANTABLE DEVICE | Site: KNEE | Status: FUNCTIONAL

## 2017-09-14 DEVICE — IMP COMP TIBIAL TRAY SNN JOURNEY NP RT SZ 3 74022213: Type: IMPLANTABLE DEVICE | Site: KNEE | Status: FUNCTIONAL

## 2017-09-14 DEVICE — BONE CEMENT SIMPLEX W/TOBRAMYCIN 6197-9-001: Type: IMPLANTABLE DEVICE | Site: KNEE | Status: FUNCTIONAL

## 2017-09-14 RX ORDER — SODIUM CHLORIDE, SODIUM LACTATE, POTASSIUM CHLORIDE, CALCIUM CHLORIDE 600; 310; 30; 20 MG/100ML; MG/100ML; MG/100ML; MG/100ML
INJECTION, SOLUTION INTRAVENOUS CONTINUOUS
Status: DISCONTINUED | OUTPATIENT
Start: 2017-09-14 | End: 2017-09-14 | Stop reason: HOSPADM

## 2017-09-14 RX ORDER — ACETAMINOPHEN 500 MG
1000 TABLET ORAL EVERY 4 HOURS PRN
Status: DISCONTINUED | OUTPATIENT
Start: 2017-09-14 | End: 2017-09-14

## 2017-09-14 RX ORDER — CYCLOBENZAPRINE HCL 5 MG
5 TABLET ORAL 3 TIMES DAILY PRN
Status: DISCONTINUED | OUTPATIENT
Start: 2017-09-14 | End: 2017-09-17 | Stop reason: HOSPADM

## 2017-09-14 RX ORDER — PROPOFOL 10 MG/ML
INJECTION, EMULSION INTRAVENOUS CONTINUOUS PRN
Status: DISCONTINUED | OUTPATIENT
Start: 2017-09-14 | End: 2017-09-14

## 2017-09-14 RX ORDER — LIDOCAINE 40 MG/G
CREAM TOPICAL
Status: DISCONTINUED | OUTPATIENT
Start: 2017-09-14 | End: 2017-09-17 | Stop reason: HOSPADM

## 2017-09-14 RX ORDER — CEFAZOLIN SODIUM 1 G/3ML
1 INJECTION, POWDER, FOR SOLUTION INTRAMUSCULAR; INTRAVENOUS EVERY 8 HOURS
Status: COMPLETED | OUTPATIENT
Start: 2017-09-15 | End: 2017-09-15

## 2017-09-14 RX ORDER — ONDANSETRON 2 MG/ML
4 INJECTION INTRAMUSCULAR; INTRAVENOUS EVERY 6 HOURS PRN
Status: DISCONTINUED | OUTPATIENT
Start: 2017-09-14 | End: 2017-09-17 | Stop reason: HOSPADM

## 2017-09-14 RX ORDER — MEPERIDINE HYDROCHLORIDE 25 MG/ML
12.5 INJECTION INTRAMUSCULAR; INTRAVENOUS; SUBCUTANEOUS EVERY 5 MIN PRN
Status: DISCONTINUED | OUTPATIENT
Start: 2017-09-14 | End: 2017-09-14 | Stop reason: HOSPADM

## 2017-09-14 RX ORDER — ACETAMINOPHEN 325 MG/1
975 TABLET ORAL EVERY 8 HOURS
Status: DISCONTINUED | OUTPATIENT
Start: 2017-09-14 | End: 2017-09-17 | Stop reason: HOSPADM

## 2017-09-14 RX ORDER — LIDOCAINE 40 MG/G
CREAM TOPICAL
Status: DISCONTINUED | OUTPATIENT
Start: 2017-09-14 | End: 2017-09-14 | Stop reason: HOSPADM

## 2017-09-14 RX ORDER — ACETAMINOPHEN 500 MG
1000 TABLET ORAL ONCE
Status: COMPLETED | OUTPATIENT
Start: 2017-09-14 | End: 2017-09-14

## 2017-09-14 RX ORDER — ONDANSETRON 4 MG/1
4 TABLET, ORALLY DISINTEGRATING ORAL EVERY 30 MIN PRN
Status: DISCONTINUED | OUTPATIENT
Start: 2017-09-14 | End: 2017-09-14 | Stop reason: HOSPADM

## 2017-09-14 RX ORDER — DEXAMETHASONE SODIUM PHOSPHATE 4 MG/ML
INJECTION, SOLUTION INTRA-ARTICULAR; INTRALESIONAL; INTRAMUSCULAR; INTRAVENOUS; SOFT TISSUE PRN
Status: DISCONTINUED | OUTPATIENT
Start: 2017-09-14 | End: 2017-09-14

## 2017-09-14 RX ORDER — ACETAMINOPHEN 325 MG/1
325 TABLET ORAL EVERY 4 HOURS PRN
Status: DISCONTINUED | OUTPATIENT
Start: 2017-09-14 | End: 2017-09-14

## 2017-09-14 RX ORDER — CHLORHEXIDINE GLUCONATE 40 MG/ML
SOLUTION TOPICAL ONCE
Status: DISCONTINUED | OUTPATIENT
Start: 2017-09-14 | End: 2017-09-14 | Stop reason: HOSPADM

## 2017-09-14 RX ORDER — METOPROLOL SUCCINATE 25 MG/1
25 TABLET, EXTENDED RELEASE ORAL DAILY
Status: DISCONTINUED | OUTPATIENT
Start: 2017-09-15 | End: 2017-09-14

## 2017-09-14 RX ORDER — OXYCODONE HYDROCHLORIDE 5 MG/1
5-10 TABLET ORAL
Status: DISCONTINUED | OUTPATIENT
Start: 2017-09-14 | End: 2017-09-17 | Stop reason: HOSPADM

## 2017-09-14 RX ORDER — FENTANYL CITRATE 50 UG/ML
50-100 INJECTION, SOLUTION INTRAMUSCULAR; INTRAVENOUS
Status: DISCONTINUED | OUTPATIENT
Start: 2017-09-14 | End: 2017-09-14 | Stop reason: HOSPADM

## 2017-09-14 RX ORDER — DIPHENHYDRAMINE HCL 12.5MG/5ML
12.5 LIQUID (ML) ORAL EVERY 6 HOURS PRN
Status: DISCONTINUED | OUTPATIENT
Start: 2017-09-14 | End: 2017-09-17 | Stop reason: HOSPADM

## 2017-09-14 RX ORDER — METOPROLOL SUCCINATE 25 MG/1
25 TABLET, EXTENDED RELEASE ORAL DAILY
Status: DISCONTINUED | OUTPATIENT
Start: 2017-09-15 | End: 2017-09-17 | Stop reason: HOSPADM

## 2017-09-14 RX ORDER — NALOXONE HYDROCHLORIDE 0.4 MG/ML
.1-.4 INJECTION, SOLUTION INTRAMUSCULAR; INTRAVENOUS; SUBCUTANEOUS
Status: DISCONTINUED | OUTPATIENT
Start: 2017-09-14 | End: 2017-09-17 | Stop reason: HOSPADM

## 2017-09-14 RX ORDER — HYDROMORPHONE HYDROCHLORIDE 1 MG/ML
.3-.5 INJECTION, SOLUTION INTRAMUSCULAR; INTRAVENOUS; SUBCUTANEOUS
Status: DISCONTINUED | OUTPATIENT
Start: 2017-09-14 | End: 2017-09-17 | Stop reason: HOSPADM

## 2017-09-14 RX ORDER — DIMENHYDRINATE 50 MG/ML
12.5 INJECTION, SOLUTION INTRAMUSCULAR; INTRAVENOUS
Status: DISCONTINUED | OUTPATIENT
Start: 2017-09-14 | End: 2017-09-14 | Stop reason: HOSPADM

## 2017-09-14 RX ORDER — OXYCODONE HCL 10 MG/1
10 TABLET, FILM COATED, EXTENDED RELEASE ORAL ONCE
Status: DISCONTINUED | OUTPATIENT
Start: 2017-09-14 | End: 2017-09-14 | Stop reason: HOSPADM

## 2017-09-14 RX ORDER — CEFAZOLIN SODIUM 2 G/100ML
2 INJECTION, SOLUTION INTRAVENOUS
Status: COMPLETED | OUTPATIENT
Start: 2017-09-14 | End: 2017-09-14

## 2017-09-14 RX ORDER — FENTANYL CITRATE 50 UG/ML
25-50 INJECTION, SOLUTION INTRAMUSCULAR; INTRAVENOUS
Status: DISCONTINUED | OUTPATIENT
Start: 2017-09-14 | End: 2017-09-14 | Stop reason: HOSPADM

## 2017-09-14 RX ORDER — FENTANYL CITRATE 50 UG/ML
INJECTION, SOLUTION INTRAMUSCULAR; INTRAVENOUS PRN
Status: DISCONTINUED | OUTPATIENT
Start: 2017-09-14 | End: 2017-09-14

## 2017-09-14 RX ORDER — EPHEDRINE SULFATE 50 MG/ML
INJECTION, SOLUTION INTRAMUSCULAR; INTRAVENOUS; SUBCUTANEOUS PRN
Status: DISCONTINUED | OUTPATIENT
Start: 2017-09-14 | End: 2017-09-14

## 2017-09-14 RX ORDER — CEFAZOLIN SODIUM 1 G/3ML
1 INJECTION, POWDER, FOR SOLUTION INTRAMUSCULAR; INTRAVENOUS SEE ADMIN INSTRUCTIONS
Status: DISCONTINUED | OUTPATIENT
Start: 2017-09-14 | End: 2017-09-14 | Stop reason: HOSPADM

## 2017-09-14 RX ORDER — LOSARTAN POTASSIUM AND HYDROCHLOROTHIAZIDE 12.5; 5 MG/1; MG/1
1 TABLET ORAL EVERY EVENING
Status: DISCONTINUED | OUTPATIENT
Start: 2017-09-14 | End: 2017-09-17 | Stop reason: HOSPADM

## 2017-09-14 RX ORDER — ONDANSETRON 2 MG/ML
4 INJECTION INTRAMUSCULAR; INTRAVENOUS EVERY 30 MIN PRN
Status: DISCONTINUED | OUTPATIENT
Start: 2017-09-14 | End: 2017-09-14 | Stop reason: HOSPADM

## 2017-09-14 RX ORDER — VANCOMYCIN HYDROCHLORIDE 1 G/20ML
INJECTION, POWDER, LYOPHILIZED, FOR SOLUTION INTRAVENOUS PRN
Status: DISCONTINUED | OUTPATIENT
Start: 2017-09-14 | End: 2017-09-14 | Stop reason: HOSPADM

## 2017-09-14 RX ORDER — MAGNESIUM HYDROXIDE 1200 MG/15ML
LIQUID ORAL PRN
Status: DISCONTINUED | OUTPATIENT
Start: 2017-09-14 | End: 2017-09-14 | Stop reason: HOSPADM

## 2017-09-14 RX ORDER — DEXTROSE MONOHYDRATE, SODIUM CHLORIDE, AND POTASSIUM CHLORIDE 50; 1.49; 4.5 G/1000ML; G/1000ML; G/1000ML
INJECTION, SOLUTION INTRAVENOUS CONTINUOUS
Status: DISCONTINUED | OUTPATIENT
Start: 2017-09-14 | End: 2017-09-16 | Stop reason: CLARIF

## 2017-09-14 RX ORDER — ONDANSETRON 4 MG/1
4 TABLET, ORALLY DISINTEGRATING ORAL EVERY 6 HOURS PRN
Status: DISCONTINUED | OUTPATIENT
Start: 2017-09-14 | End: 2017-09-17 | Stop reason: HOSPADM

## 2017-09-14 RX ORDER — BUPIVACAINE HYDROCHLORIDE 7.5 MG/ML
INJECTION, SOLUTION INTRASPINAL PRN
Status: DISCONTINUED | OUTPATIENT
Start: 2017-09-14 | End: 2017-09-14

## 2017-09-14 RX ORDER — DIPHENHYDRAMINE HYDROCHLORIDE 50 MG/ML
12.5 INJECTION INTRAMUSCULAR; INTRAVENOUS EVERY 6 HOURS PRN
Status: DISCONTINUED | OUTPATIENT
Start: 2017-09-14 | End: 2017-09-17 | Stop reason: HOSPADM

## 2017-09-14 RX ORDER — PROPOFOL 10 MG/ML
INJECTION, EMULSION INTRAVENOUS PRN
Status: DISCONTINUED | OUTPATIENT
Start: 2017-09-14 | End: 2017-09-14

## 2017-09-14 RX ORDER — AMOXICILLIN 250 MG
1-2 CAPSULE ORAL 2 TIMES DAILY
Status: DISCONTINUED | OUTPATIENT
Start: 2017-09-14 | End: 2017-09-17 | Stop reason: HOSPADM

## 2017-09-14 RX ORDER — HYDROMORPHONE HYDROCHLORIDE 1 MG/ML
.3-.5 INJECTION, SOLUTION INTRAMUSCULAR; INTRAVENOUS; SUBCUTANEOUS EVERY 5 MIN PRN
Status: DISCONTINUED | OUTPATIENT
Start: 2017-09-14 | End: 2017-09-14 | Stop reason: HOSPADM

## 2017-09-14 RX ORDER — ONDANSETRON 2 MG/ML
INJECTION INTRAMUSCULAR; INTRAVENOUS PRN
Status: DISCONTINUED | OUTPATIENT
Start: 2017-09-14 | End: 2017-09-14

## 2017-09-14 RX ADMIN — FENTANYL CITRATE 50 MCG: 50 INJECTION, SOLUTION INTRAMUSCULAR; INTRAVENOUS at 15:34

## 2017-09-14 RX ADMIN — SODIUM CHLORIDE, POTASSIUM CHLORIDE, SODIUM LACTATE AND CALCIUM CHLORIDE: 600; 310; 30; 20 INJECTION, SOLUTION INTRAVENOUS at 14:18

## 2017-09-14 RX ADMIN — TRANEXAMIC ACID 1 G: 100 INJECTION, SOLUTION INTRAVENOUS at 17:54

## 2017-09-14 RX ADMIN — TRANEXAMIC ACID 1 G: 100 INJECTION, SOLUTION INTRAVENOUS at 15:50

## 2017-09-14 RX ADMIN — MIDAZOLAM HYDROCHLORIDE 1 MG: 1 INJECTION, SOLUTION INTRAMUSCULAR; INTRAVENOUS at 14:48

## 2017-09-14 RX ADMIN — EPINEPHRINE 25 ML GIVEN: 1 INJECTION INTRAMUSCULAR; INTRAVENOUS; SUBCUTANEOUS at 14:53

## 2017-09-14 RX ADMIN — SENNOSIDES AND DOCUSATE SODIUM 1 TABLET: 8.6; 5 TABLET ORAL at 21:15

## 2017-09-14 RX ADMIN — PROPOFOL 30 MG: 10 INJECTION, EMULSION INTRAVENOUS at 16:38

## 2017-09-14 RX ADMIN — DEXAMETHASONE SODIUM PHOSPHATE 4 MG: 4 INJECTION, SOLUTION INTRA-ARTICULAR; INTRALESIONAL; INTRAMUSCULAR; INTRAVENOUS; SOFT TISSUE at 15:56

## 2017-09-14 RX ADMIN — ACETAMINOPHEN 1000 MG: 500 TABLET, FILM COATED ORAL at 14:13

## 2017-09-14 RX ADMIN — ACETAMINOPHEN 975 MG: 325 TABLET, FILM COATED ORAL at 21:15

## 2017-09-14 RX ADMIN — SODIUM CHLORIDE, POTASSIUM CHLORIDE, SODIUM LACTATE AND CALCIUM CHLORIDE: 600; 310; 30; 20 INJECTION, SOLUTION INTRAVENOUS at 17:03

## 2017-09-14 RX ADMIN — HYDROMORPHONE HYDROCHLORIDE 0.3 MG: 1 INJECTION, SOLUTION INTRAMUSCULAR; INTRAVENOUS; SUBCUTANEOUS at 21:14

## 2017-09-14 RX ADMIN — PROPOFOL 30 MG: 10 INJECTION, EMULSION INTRAVENOUS at 16:48

## 2017-09-14 RX ADMIN — LOSARTAN POTASSIUM AND HYDROCHLOROTHIAZIDE 1 TABLET: 50; 12.5 TABLET, FILM COATED ORAL at 22:40

## 2017-09-14 RX ADMIN — Medication 5 MG: at 15:59

## 2017-09-14 RX ADMIN — Medication 5 MG: at 16:04

## 2017-09-14 RX ADMIN — CEFAZOLIN SODIUM 1 G: 2 INJECTION, SOLUTION INTRAVENOUS at 17:26

## 2017-09-14 RX ADMIN — CEFAZOLIN SODIUM 2 G: 2 INJECTION, SOLUTION INTRAVENOUS at 15:36

## 2017-09-14 RX ADMIN — PROPOFOL 65 MCG/KG/MIN: 10 INJECTION, EMULSION INTRAVENOUS at 15:45

## 2017-09-14 RX ADMIN — Medication 5 MG: at 17:39

## 2017-09-14 RX ADMIN — POTASSIUM CHLORIDE, DEXTROSE MONOHYDRATE AND SODIUM CHLORIDE: 150; 5; 450 INJECTION, SOLUTION INTRAVENOUS at 22:40

## 2017-09-14 RX ADMIN — FENTANYL CITRATE 50 MCG: 50 INJECTION, SOLUTION INTRAMUSCULAR; INTRAVENOUS at 15:49

## 2017-09-14 RX ADMIN — SODIUM CHLORIDE, POTASSIUM CHLORIDE, SODIUM LACTATE AND CALCIUM CHLORIDE: 600; 310; 30; 20 INJECTION, SOLUTION INTRAVENOUS at 16:12

## 2017-09-14 RX ADMIN — Medication 5 MG: at 16:07

## 2017-09-14 RX ADMIN — ONDANSETRON 4 MG: 2 INJECTION INTRAMUSCULAR; INTRAVENOUS at 15:40

## 2017-09-14 RX ADMIN — BUPIVACAINE HYDROCHLORIDE IN DEXTROSE 10.5 MG: 7.5 INJECTION, SOLUTION SUBARACHNOID at 15:42

## 2017-09-14 ASSESSMENT — ACTIVITIES OF DAILY LIVING (ADL)
SWALLOWING: 0-->SWALLOWS FOODS/LIQUIDS WITHOUT DIFFICULTY
TOILETING: 0-->INDEPENDENT
SWALLOWING: 0-->SWALLOWS FOODS/LIQUIDS WITHOUT DIFFICULTY
DRESS: 0-->INDEPENDENT
EATING: 0-->INDEPENDENT
AMBULATION: 0-->INDEPENDENT
TRANSFERRING: 0-->INDEPENDENT
BATHING: 0-->INDEPENDENT
COMMUNICATION: 0-->UNDERSTANDS/COMMUNICATES WITHOUT DIFFICULTY

## 2017-09-14 ASSESSMENT — ENCOUNTER SYMPTOMS: SEIZURES: 0

## 2017-09-14 ASSESSMENT — LIFESTYLE VARIABLES: TOBACCO_USE: 0

## 2017-09-14 ASSESSMENT — COPD QUESTIONNAIRES: COPD: 0

## 2017-09-14 NOTE — ANESTHESIA PROCEDURE NOTES
Peripheral nerve/Neuraxial procedure note : intrathecal  Pre-Procedure  Performed by GAYLE LUCIANO  Location: OR      Pre-Anesthestic Checklist: patient identified, IV checked, risks and benefits discussed, informed consent, monitors and equipment checked and pre-op evaluation    Timeout  Correct Patient: Yes   Correct Procedure: Yes   Correct Site: Yes   Correct Laterality: N/A   Correct Position: Yes   Site Marked: N/A   .   Procedure Documentation    .    Procedure:    Intrathecal.  Insertion Site:L3-4  (midline approach)      Patient Prep;mask, sterile gloves, povidone-iodine 7.5% surgical scrub, patient draped.  .  Needle: Austin tip Spinal Needle (gauge): 25  Spinal/LP Needle Length (inches): 3.5 # of attempts: 1 and # of redirects: : 0. Introducer used Introducer: 20 G .       Assessment/Narrative  Paresthesias: No.  .  .  clear CSF fluid removed . Comments:  EZ injection of 1.4 cc of 0.75% Marcaine - no paresthesias on injection or s/s IV.   Immediately to supine.   Pt mildly sedated, but communicative throughout procedure.   Pt tolerated well.   No complications.
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
## 2017-09-14 NOTE — IP AVS SNAPSHOT
[place] [address] [facility]: [phone]                                              INTERAGENCY TRANSFER FORM - PHYSICIAN ORDERS   2017                    Hospital Admission Date: 2017  NIKITA KEBEDE   : 1935  Sex: Female        Attending Provider: (none)    Allergies:  Excedrin Back & [Acetaminophen-aspirin Buffered]    Infection:  None   Service:  SURGERY    Ht:  1.524 m (5')   Wt:  66.7 kg (147 lb 0.8 oz)   Admission Wt:  66.7 kg (147 lb 0.8 oz)    BMI:  28.72 kg/m 2   BSA:  1.68 m 2            Patient PCP Information     Provider PCP Type    Mora Bang MD General      ED Clinical Impression     Diagnosis Description Comment Added By Time Added    Arthritis of knee, right [M17.11] Arthritis of knee, right [M17.11]  Kathe Carbajal, PT 9/15/2017 10:23 AM    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]  Geovanny Wilks PA-C 2017  5:48 AM      Hospital Problems as of 2017              Priority Class Noted POA    Arthritis of knee, right Medium  2017 Yes      Non-Hospital Problems as of 2017              Priority Class Noted    Nonspecific abnormal electrocardiogram (ECG) (EKG) Medium  10/22/2014    Hypertension Medium  2014    Hyperlipidemia Medium  2014    Family history of heart disease Medium  2014    Coronary artery disease Medium  2014    Acute diverticulitis Medium  2016      Code Status History     Date Active Date Inactive Code Status Order ID Comments User Context    2017  7:38 PM 2017  2:30 PM Full Code [id number]  Constantino Andrews MD Inpatient    2016  3:12 PM 2017  7:38 PM Full Code [id number]  Fred Chiu MD Outpatient    2016  6:24 PM 2016  3:12 PM Full Code [id number]  Fred Chiu MD Inpatient         Medication Review      START taking        Dose / Directions Comments    cyclobenzaprine 5 MG  tablet   Commonly known as:  FLEXERIL        Dose:  5 mg   Take 1 tablet (5 mg) by mouth 3 times daily as needed for muscle spasms   Quantity:  42 tablet   Refills:  1        enoxaparin 40 MG/0.4ML injection   Commonly known as:  LOVENOX        Dose:  40 mg   Inject 0.4 mLs (40 mg) Subcutaneous every 24 hours   Quantity:  14 Syringe   Refills:  0        ondansetron 4 MG ODT tab   Commonly known as:  ZOFRAN-ODT        Dose:  4 mg   Take 1 tablet (4 mg) by mouth every 6 hours as needed for nausea or vomiting   Quantity:  60 tablet   Refills:  0        order for DME   Used for:  Arthritis of knee, right        Equipment being ordered: Walker Wheels () and Walker () Treatment Diagnosis: Right total knee replacement   Quantity:  1 each   Refills:  0        oxyCODONE 5 MG IR tablet   Commonly known as:  ROXICODONE        Dose:  5-10 mg   Take 1-2 tablets (5-10 mg) by mouth every 4 hours as needed for moderate to severe pain   Quantity:  96 tablet   Refills:  0        senna-docusate 8.6-50 MG per tablet   Commonly known as:  SENOKOT-S;PERICOLACE        Dose:  1-2 tablet   Take 1-2 tablets by mouth 2 times daily as needed for constipation   Quantity:  100 tablet   Refills:  1          CONTINUE these medications which have NOT CHANGED        Dose / Directions Comments    losartan-hydrochlorothiazide 50-12.5 MG per tablet   Commonly known as:  HYZAAR        Dose:  1 tablet   Take 1 tablet by mouth every evening   Refills:  0        METOPROLOL SUCCINATE ER PO        Dose:  25 mg   Take 25 mg by mouth daily   Refills:  0        TYLENOL PO        Dose:  325-650 mg   Take 325-650 mg by mouth every 6 hours as needed for mild pain or fever   Refills:  0        UNKNOWN TO PATIENT        Dose:  3 capsule   Take 3 capsules by mouth daily as needed (1 hour prior to dental appointment) Unknown Antibiotic   Refills:  0                  Further instructions from your care team       .TOTAL KNEE REPLACEMENT TAKE HOME  "INSTRUCTIONS  Your surgeon will answer any questions about your progress. General guidelines for your care are listed below. Your surgeon may give additional instructions for your care at home. Please follow them carefully    Activity Level  1. Physical activity may be resumed gradually according to your comfort level and your surgeon s instructions. Follow your exercise program as instructed by your therapist. Do exercises at least twice a day. you may ice your knee after exercising.  2. Complete exercises two hours before bedtime to minimize the effect pain may have on sleep.  Refer to pages 19-22 of you \"Total Knee Replacement\" booklet for details  3. Do not wear your knee immobilizer unless your doctor has specifically told you to continue it.    Good Health Practices  1. Maintain an adequate fluid intake and eat a well balanced diet.  2. Be sure to include the basic food groups, such as dairy products, meat/fish, vegetables, and fruit. Each of these foods contribute to your wound healing and increasing your strength.  3. Surgery, decreased activity and pain medication all contribute to a decrease in bowel activity that can result in constipation. It is recommended that you increase your liquid intake, add fiber to your diet, increase activity, and decrease pain medication use. If you have any problems, notify your physician.  4. Wear your anti-embolism stockings day and night until seen by your surgeon if you were issued these at discharge.  (Not all patients will have anti-embolism stockings) Remove twice a day for one hour at a time. You may hand wash and air dry your stockings.  5. Notify your dentist of your total knee surgery and call your dentist one week before a dental appointment for antibiotics, if your dentist will not prescribe antibiotics then call your surgeon to ask for next steps.      Incision/Dressing Care  1. Keep incision clean and dry per surgeons instructions  2. Cover incision if you are " still having drainage.  3.  If you have a waterproof dressing okay to Shower.    Things to Watch For  1. Check incision daily for increased redness, tenderness, swelling, or drainage along the incision line. If these occur, please notify your doctor. Also, call if you develop a fever above 101 .  2. Please notify your doctor if you experience any calf pain and/or if you have surgical pain not relieved by the pain medication prescribed by your doctor.        Revised 05/8/17    Summary of Visit     Reason for your hospital stay       Right sided total knee arthroplasty             After Care     Activity       Your activity upon discharge: activity as tolerated    Discharge precautions/Knee:  - Avoid sudden change in movements.  - Point your toes forward while walking.   - Take small steps when turning to avoid twisting your affected leg.  - Do not kneel or use a low toilet or low furniture.  - When lying on your back, keep the knee of the affected leg pointed straight up. -Use a pillow between your legs when lying on your side.  - Avoid sitting for more than one hour at a time without standing and stretching.  - Walk often; for 10 minutes four to five times a day. Increase the amount of time you walk as you can tolerate.  - Do the knee exercises that were prescribed by your therapist.  - Continue sexual activity as you are comfortable.       Diet       Follow this diet upon discharge: Orders Placed This Encounter      Advance Diet as Tolerated: Regular Diet Adult       Wound care and dressings       Caring for your dressing: Leave dressing undisturbed until follow up with orthopedic surgeon. Okay to shower with dressing in place. Do not soak incision/wound. If dressing becomes saturated >60% then transition to daily dry dressing changes.             Referrals     Home Care PT Referral for Hospital Discharge       PT with [facility] to eval and treat    Your provider has ordered home care - physical therapy. If  you have not been contacted within 2 days of your discharge please call the department phone number listed on the top of this document.       Home care nursing referral       RN skilled nursing visit by [facility]. RN to assess pain level and activity tolerance, incision for signs/symptoms of infection and hydration, nutrition and bowel status.    Your provider has ordered home care nursing services. If you have not been contacted within 2 days of your discharge please call the inpatient department phone number at [phone] .             Follow-Up Appointment Instructions     Future Labs/Procedures    Follow-up and recommended labs and tests      Comments:    Follow up appointments: Follow up in 10-14 days in clinic with Dr. Andrews. Please call the clinic to schedule an appointment. [facility] [address] [place] [address] Phone: [phone]      Follow-Up Appointment Instructions     Follow-up and recommended labs and tests        Follow up appointments: Follow up in 10-14 days in clinic with Dr. Andrews. Please call the clinic to schedule an appointment. [facility] [address] [place] [address] Phone: [phone]

## 2017-09-14 NOTE — IP AVS SNAPSHOT
` ` Patient Information     Patient Name Sex     Cristal Wynn ([id number]) Female 1935       Room Bed    5518 5518-01      Patient Demographics     Address Phone    [address] [facility] [address] [phone] (Home)  [phone] (Mobile)      Patient Ethnicity & Race     Ethnic Group Patient Race    American White      PCP and Center    Primary Care Provider  Phone [place]     Mora Bang [phone] [facility] [address] [facility] [address], [place]*        Emergency Contact(s)     Name Relation Home Work Mobile    HAWK WYNN [phone]    BRYAN WYNN Relative   [phone]    Amina Wynn Daughter [phone]      Documents on File        Status Date Received Description       Documents for the Patient    Face Sheet Received () 03/02/10     Face Sheet Received () 03/09/10     Insurance Card Received 10/21/14     External Medication Information Consent Accepted 10/21/14     Consent for Services - Hospital/Clinic Received () 10/21/14     [facility] Specified Other       Insurance Card Received 13     Insurance Card       Privacy Notice - [place] Received 13     Consent for EHR Access Received 13     Consent for Services - Hospital/Clinic Received () 13     Consent for Services/Privacy Notice - Hospital/Clinic Received 16     Insurance Card Received 16     Patient ID Received 17     Insurance Card Received 17     Consent to Communicate Received 17     Privacy Notice - [place]  (Deleted)      Patient ID  (Deleted)      Consent for EHR Access  (Deleted)      Patient ID Received (Deleted) 13     Patient ID Received (Deleted) 14     Insurance Card Received (Deleted) 16        Documents for the Encounter    Plan of Care  17 [facility] PATIENT CARE PLAN    H/P - History and Physical  17 [facility] \[facility]\ [facility]- HISTORY AND  PHYSICAL 09/07/2017    CMS IM for Patient Signature Received 09/16/17 1MM    Lab Result - HIM Scan  09/14/17 LAB- [facility]    CE Point of Care Auth Received        Admission Information     Attending Provider Admitting Provider Admission Type Admission Date/Time     Constantino Andrews MD Elective 09/14/17  1250    Discharge Date Hospital Service Auth/Cert Status Service Area    09/17/17 Surgery Incomplete [facility]    Unit Room/Bed Admission Status        55 ORTHO SPEC UNIT 5518/5518-01 Discharged (Confirmed)       Admission     Complaint    end stage arthritis right knee, Arthritis of knee, right      Hospital Account     Name Acct ID Class Status Primary Coverage    Cristal Wynn [id number] Inpatient Open MEDICARE - MEDICARE            Guarantor Account (for Hospital Account #[id number])     Name Relation to Pt Service Area Active? Acct Type    Cristal Wynn Self FCS Yes Personal/Family    Address Phone          [address] [place] [address] [phone](H)              Coverage Information (for Hospital Account #[id number])     1. MEDICARE/MEDICARE     F/O Payor/Plan Precert #    MEDICARE/MEDICARE     Subscriber Subscriber #    Cristal Wynn [id number]    Address Phone    ATTN CLAIMS  [address]  [facility] [place] [address] [phone]          2. BCBS[place]     F/O Payor/Plan Precert #    [place]     Subscriber Subscriber #    Cristal Wynn [id number]    Address Phone    [address] [address]  [place], [place] [id number] [phone]

## 2017-09-14 NOTE — IP AVS SNAPSHOT
[address] Special[facility]    [address] [place] [address]    Phone:  [phone]                                       After Visit Summary   9/14/2017    Cristal Wynn    MRN: [id number]           After Visit Summary Signature Page     I have received my discharge instructions, and my questions have been answered. I have discussed any challenges I see with this plan with the nurse or doctor.    ..........................................................................................................................................  Patient/Patient Representative Signature      ..........................................................................................................................................  Patient Representative Print Name and Relationship to Patient    ..................................................               ................................................  Date                                            Time    ..........................................................................................................................................  Reviewed by Signature/Title    ...................................................              ..............................................  Date                                                            Time

## 2017-09-14 NOTE — ANESTHESIA PROCEDURE NOTES
Peripheral nerve/Neuraxial procedure note : Adductor canal and Femoral  Pre-Procedure  Performed by GAYLE LUCIANO  Location: pre-op      Pre-Anesthestic Checklist: patient identified, IV checked, site marked, risks and benefits discussed, informed consent, monitors and equipment checked, pre-op evaluation, at physician/surgeon's request and post-op pain management    Timeout  Correct Patient: Yes   Correct Procedure: Yes   Correct Site: Yes   Correct Laterality: Yes   Correct Position: Yes   Site Marked: Yes   .   Procedure Documentation    .    Procedure:    Adductor canal and Femoral.  Insertion site: upper, medial thigh. Local skin infiltrated with 3 mL of 1% lidocaine.     Ultrasound used to identify targeted nerve, plexus, or vascular marker and placed a needle adjacent to it., Ultrasound was used to visualize the spread of the anesthetic in close proximity to the above stated nerve. A permanent image is entered into the patient's record.  Patient Prep;mask, sterile gloves, chlorhexidine gluconate and isopropyl alcohol.  .  Needle: insulated (8 cm [place]) Needle Gauge: 22.  Needle Length (millimeters) 80  Insertion Method: Single Shot.       Assessment/Narrative  Paresthesias: No.  Injection made incrementally with aspirations every 3 mL..  The placement was negative for: blood aspirated, painful injection and site bleeding.  Bolus given via needle..   Secured via.   Complications: none. Test dose of mL at. Test dose negative for signs of intravascular, subdural or intrathecal injection. Comments:  25 cc of 0.5% Bupivacaine with epinephrine (1:400K) injected on the anterior side of the femoral artery, in close proximity to the femoral nerve branches via the adductor canal approach.    Pt tolerated well.    No complications.      The surgeon has given a verbal order transferring care of this patient to me for the performance of a regional analgesia block for post-op pain control. It is requested of me  because I am uniquely trained and qualified to perform this block and the surgeon is neither trained nor qualified to perform this procedure.

## 2017-09-14 NOTE — PROGRESS NOTES
Admission medication history interview status for the 9/14/2017  admission is complete. See EPIC admission navigator for prior to admission medications     Medication history source reliability:Good    Medication history interview source(s):Patient    Medication history resources (including written lists, pill bottles, clinic record):None    Primary pharmacy.[facility]    Additional medication history information not noted on PTA med list :None    Time spent in this activity: 45 minutes    Prior to Admission medications    Medication Sig Last Dose Taking? Auth Provider   METOPROLOL SUCCINATE ER PO Take 25 mg by mouth daily 9/14/2017 at 0700 Yes Reported, Patient   Acetaminophen (TYLENOL PO) Take 325-650 mg by mouth every 6 hours as needed for mild pain or fever 9/13/2017 at am Yes Reported, Patient   UNKNOWN TO PATIENT Take 3 capsules by mouth daily as needed (1 hour prior to dental appointment) more than a month at prn Yes Reported, Patient   losartan-hydrochlorothiazide (HYZAAR) 50-12.5 MG per tablet Take 1 tablet by mouth daily 9/12/2017 at pm Yes Reported, Patient

## 2017-09-14 NOTE — BRIEF OP NOTE
[facility] Brief Operative Note    Pre-operative diagnosis: end stage arthritis right knee   Post-operative diagnosis * same   Procedure: Procedure(s):  RIGHT TOTAL KNEE ARTHROPLASTY  - Wound Class: I-Clean   Surgeon(s): Surgeon(s) and Role:     * Constantino Andrews MD - Primary     * Geovanny Wilks PA-C   Estimated blood loss: 25 mL    Specimens: * No specimens in log *   Findings: lateral arthritis

## 2017-09-14 NOTE — IP AVS SNAPSHOT
MRN:[id number]                      After Visit Summary   9/14/2017    Cristal Wynn    MRN: [id number]           Thank you!     Thank you for choosing [facility] for your care. Our goal is always to provide you with excellent care. Hearing back from our patients is one way we can continue to improve our services. Please take a few minutes to complete the written survey that you may receive in the mail after you visit with us. Thank you!        Patient Information     Date Of Birth          1935        Designated Caregiver       Most Recent Value    Caregiver    Will someone help with your care after discharge? yes    Name of designated caregiver Shira    Phone number of caregiver [phone]    Caregiver address [address]      About your hospital stay     You were admitted on:  September 14, 2017 You last received care in the:  [place] [facility] [address] [facility]    You were discharged on:  September 17, 2017        Reason for your hospital stay       Right sided total knee arthroplasty                  Who to Call     For medical emergencies, please call 911.  For non-urgent questions about your medical care, please call your primary care provider or clinic, [phone]  For questions related to your surgery, please call your surgery clinic        Attending Provider     Provider Specialty    Constantino Andrews MD Orthopedics       Primary Care Provider Office Phone # Fax #    Mora Bang [phone]       When to contact your care team       When you should become concerned:     Call your surgeon if you have:   - a temperature higher than 101.6   - problems or signs of infection at your incision site: increased redness, tenderness, drainage, odor, warmth, or green or yellow discharge   - any change in movement such as new weakness or inability to move affected arm or leg   - any change in sensation such as new numbness or tingling    - any unusual bruising or bleeding some spotting is normal  - severe pain that is not relieved by medicine, rest, or ice   - any other problems, questions, or concerns about your surgery    Call your primary provider if you have:  - dizziness or lightheadedness that will not stop   - nausea (upset stomach) and vomiting (throwing up) that will not stop   - any bowel problems such as constipation or bloody stools   - any problems urinating such as burning, urgency, or frequency   - any other problems, questions, or concerns                  After Care Instructions     Activity       Your activity upon discharge: activity as tolerated    Discharge precautions/Knee:  - Avoid sudden change in movements.  - Point your toes forward while walking.   - Take small steps when turning to avoid twisting your affected leg.  - Do not kneel or use a low toilet or low furniture.  - When lying on your back, keep the knee of the affected leg pointed straight up. -Use a pillow between your legs when lying on your side.  - Avoid sitting for more than one hour at a time without standing and stretching.  - Walk often; for 10 minutes four to five times a day. Increase the amount of time you walk as you can tolerate.  - Do the knee exercises that were prescribed by your therapist.  - Continue sexual activity as you are comfortable.            Diet       Follow this diet upon discharge: Orders Placed This Encounter      Advance Diet as Tolerated: Regular Diet Adult            Wound care and dressings       Caring for your dressing: Leave dressing undisturbed until follow up with orthopedic surgeon. Okay to shower with dressing in place. Do not soak incision/wound. If dressing becomes saturated >60% then transition to daily dry dressing changes.                  Follow-up Appointments     Follow-up and recommended labs and tests        Follow up appointments: Follow up in 10-14 days in clinic with Dr. Andrews. Please call the clinic to  "schedule an appointment. [facility] [address] [place] [address] Phone: [phone]                  Additional Services     Home Care PT Referral for Hospital Discharge       PT with [facility] to eval and treat    Your provider has ordered home care - physical therapy. If you have not been contacted within 2 days of your discharge please call the department phone number listed on the top of this document.            Home care nursing referral       RN skilled nursing visit by [facility]. RN to assess pain level and activity tolerance, incision for signs/symptoms of infection and hydration, nutrition and bowel status.    Your provider has ordered home care nursing services. If you have not been contacted within 2 days of your discharge please call the inpatient department phone number at [phone] .                  Further instructions from your care team       .TOTAL KNEE REPLACEMENT TAKE HOME INSTRUCTIONS  Your surgeon will answer any questions about your progress. General guidelines for your care are listed below. Your surgeon may give additional instructions for your care at home. Please follow them carefully    Activity Level  1. Physical activity may be resumed gradually according to your comfort level and your surgeon s instructions. Follow your exercise program as instructed by your therapist. Do exercises at least twice a day. you may ice your knee after exercising.  2. Complete exercises two hours before bedtime to minimize the effect pain may have on sleep.  Refer to pages 19-22 of you \"Total Knee Replacement\" booklet for details  3. Do not wear your knee immobilizer unless your doctor has specifically told you to continue it.    Good Health Practices  1. Maintain an adequate fluid intake and eat a well balanced diet.  2. Be sure to include the basic food groups, such as dairy products, meat/fish, vegetables, and fruit. Each of these foods contribute to your wound " healing and increasing your strength.  3. Surgery, decreased activity and pain medication all contribute to a decrease in bowel activity that can result in constipation. It is recommended that you increase your liquid intake, add fiber to your diet, increase activity, and decrease pain medication use. If you have any problems, notify your physician.  4. Wear your anti-embolism stockings day and night until seen by your surgeon if you were issued these at discharge.  (Not all patients will have anti-embolism stockings) Remove twice a day for one hour at a time. You may hand wash and air dry your stockings.  5. Notify your dentist of your total knee surgery and call your dentist one week before a dental appointment for antibiotics, if your dentist will not prescribe antibiotics then call your surgeon to ask for next steps.      Incision/Dressing Care  1. Keep incision clean and dry per surgeons instructions  2. Cover incision if you are still having drainage.  3.  If you have a waterproof dressing okay to Shower.    Things to Watch For  1. Check incision daily for increased redness, tenderness, swelling, or drainage along the incision line. If these occur, please notify your doctor. Also, call if you develop a fever above 101 .  2. Please notify your doctor if you experience any calf pain and/or if you have surgical pain not relieved by the pain medication prescribed by your doctor.        Revised 05/8/17    Pending Results     No orders found from 9/12/2017 to 9/15/2017.            Admission Information     Date & Time Provider Department Dept. Phone    9/14/2017 Constantino Andrews MD [place] [address] [facility] [phone]      Your Vitals Were     Blood Pressure Pulse Temperature Respirations Height Weight    95/57 (BP Location: Right arm) 83 97.7  F (36.5  C) (Axillary) 16 1.524 m (5') 66.7 kg (147 lb 0.8 oz)    Pulse Oximetry BMI (Body Mass Index)                94% 28.72 kg/m2          Jorge  "Information     [email or web address] lets you send messages to your doctor, view your test results, renew your prescriptions, schedule appointments and more. To sign up, go to [email or web address] . Click on \"Log in\" on the left side of the screen, which will take you to the Welcome page. Then click on \"Sign up Now\" on the right side of the page.     You will be asked to enter the access code listed below, as well as some personal information. Please follow the directions to create your username and password.     Your access code is: [email or web address]  Expires: 2017  8:55 AM     Your access code will  in 90 days. If you need help or a new code, please call your [facility] clinic or [phone].        Care EveryWhere ID     This is your Care EveryWhere ID. This could be used by other organizations to access your [facility] medical records  [id number]        Equal Access to Services     RUDDY RAE AH: Tabatha Barrow, watania cunningham, qaje kaalmajean carlos kulkarni, ivette ro . So [facility] [phone].    ATENCIÓN: Si habla español, tiene a birch disposición servicios gratuitos de asistencia lingüística. Llame al [phone].    We comply with applicable federal civil rights laws and [place] laws. We do not discriminate on the basis of race, color, national origin, age, disability sex, sexual orientation or gender identity.               Review of your medicines      START taking        Dose / Directions    cyclobenzaprine 5 MG tablet   Commonly known as:  FLEXERIL        Dose:  5 mg   Take 1 tablet (5 mg) by mouth 3 times daily as needed for muscle spasms   Quantity:  42 tablet   Refills:  1       enoxaparin 40 MG/0.4ML injection   Commonly known as:  LOVENOX        Dose:  40 mg   Inject 0.4 mLs (40 mg) Subcutaneous every 24 hours   Quantity:  14 Syringe   Refills:  0       ondansetron 4 MG ODT tab   Commonly known as:  ZOFRAN-ODT        Dose:  4 mg   Take 1 tablet (4 mg) by mouth " every 6 hours as needed for nausea or vomiting   Quantity:  60 tablet   Refills:  0       order for DME        Equipment being ordered: Walker Wheels () and Walker () Treatment Diagnosis: Right total knee replacement   Quantity:  1 each   Refills:  0       oxyCODONE 5 MG IR tablet   Commonly known as:  ROXICODONE        Dose:  5-10 mg   Take 1-2 tablets (5-10 mg) by mouth every 4 hours as needed for moderate to severe pain   Quantity:  96 tablet   Refills:  0       senna-docusate 8.6-50 MG per tablet   Commonly known as:  SENOKOT-S;PERICOLACE        Dose:  1-2 tablet   Take 1-2 tablets by mouth 2 times daily as needed for constipation   Quantity:  100 tablet   Refills:  1         CONTINUE these medicines which have NOT CHANGED        Dose / Directions    losartan-hydrochlorothiazide 50-12.5 MG per tablet   Commonly known as:  HYZAAR        Dose:  1 tablet   Take 1 tablet by mouth every evening   Refills:  0       METOPROLOL SUCCINATE ER PO        Dose:  25 mg   Take 25 mg by mouth daily   Refills:  0       TYLENOL PO        Dose:  325-650 mg   Take 325-650 mg by mouth every 6 hours as needed for mild pain or fever   Refills:  0       UNKNOWN TO PATIENT        Dose:  3 capsule   Take 3 capsules by mouth daily as needed (1 hour prior to dental appointment) Unknown Antibiotic   Refills:  0            Where to get your medicines      Some of these will need a paper prescription and others can be bought over the counter. Ask your nurse if you have questions.     Bring a paper prescription for each of these medications     cyclobenzaprine 5 MG tablet    enoxaparin 40 MG/0.4ML injection    ondansetron 4 MG ODT tab    order for DME    oxyCODONE 5 MG IR tablet    senna-docusate 8.6-50 MG per tablet                Protect others around you: Learn how to safely use, store and throw away your medicines at www.disposemymeds.org.             Medication List: This is a list of all your medications and when to take them.  Check marks below indicate your daily home schedule. Keep this list as a reference.      Medications           Morning Afternoon Evening Bedtime As Needed    cyclobenzaprine 5 MG tablet   Commonly known as:  FLEXERIL   Take 1 tablet (5 mg) by mouth 3 times daily as needed for muscle spasms   Last time this was given:  5 mg on 9/16/2017 10:00 PM                                   enoxaparin 40 MG/0.4ML injection   Commonly known as:  LOVENOX   Inject 0.4 mLs (40 mg) Subcutaneous every 24 hours   Last time this was given:  40 mg on 9/16/2017  5:54 PM   Next Dose Due:  9/17 @6pm                                   losartan-hydrochlorothiazide 50-12.5 MG per tablet   Commonly known as:  HYZAAR   Take 1 tablet by mouth every evening   Last time this was given:  1 tablet on 9/15/2017  8:40 PM   Next Dose Due:  9/17 evening                                   METOPROLOL SUCCINATE ER PO   Take 25 mg by mouth daily   Last time this was given:  25 mg on 9/17/2017  8:00 AM   Next Dose Due:  9/18 AM                                   ondansetron 4 MG ODT tab   Commonly known as:  ZOFRAN-ODT   Take 1 tablet (4 mg) by mouth every 6 hours as needed for nausea or vomiting   Next Dose Due:  As needed                                   order for DME   Equipment being ordered: Walker Wheels () and Walker () Treatment Diagnosis: Right total knee replacement                                oxyCODONE 5 MG IR tablet   Commonly known as:  ROXICODONE   Take 1-2 tablets (5-10 mg) by mouth every 4 hours as needed for moderate to severe pain   Last time this was given:  10 mg on 9/17/2017  9:08 AM   Next Dose Due:  9/17 after 1:08pm                                   senna-docusate 8.6-50 MG per tablet   Commonly known as:  SENOKOT-S;PERICOLACE   Take 1-2 tablets by mouth 2 times daily as needed for constipation   Last time this was given:  1 tablet on 9/17/2017  7:52 AM   Next Dose Due:  9/17 pm                                      TYLENOL  PO   Take 325-650 mg by mouth every 6 hours as needed for mild pain or fever   Last time this was given:  975 mg on 9/17/2017  5:52 AM   Next Dose Due:  As needed anytime after 9/17 noon                                   UNKNOWN TO PATIENT   Take 3 capsules by mouth daily as needed (1 hour prior to dental appointment) Unknown Antibiotic

## 2017-09-14 NOTE — ANESTHESIA CARE TRANSFER NOTE
Patient: Cristal Wynn    Procedure(s):  RIGHT TOTAL KNEE ARTHROPLASTY  - Wound Class: I-Clean    Diagnosis: end stage arthritis right knee  Diagnosis Additional Information: No value filed.    Anesthesia Type:   Spinal, Periph. Nerve Block for postop pain     Note:  Airway :Face Mask  Patient transferred to:PACU  Comments: Pt to PACU with O2 via mask, airway patent, VSS.  Report to RN.      Vitals: (Last set prior to Anesthesia Care Transfer)    CRNA VITALS  9/14/2017 1748 - 9/14/2017 1818      9/14/2017             Resp Rate (set): 10                Electronically Signed By: YARELY Osman CRNA  September 14, 2017  6:18 PM

## 2017-09-15 ENCOUNTER — APPOINTMENT (OUTPATIENT)
Dept: PHYSICAL THERAPY | Facility: CLINIC | Age: 82
DRG: 470 | End: 2017-09-15
Attending: ORTHOPAEDIC SURGERY
Payer: MEDICARE

## 2017-09-15 LAB
GLUCOSE SERPL-MCNC: 124 MG/DL (ref 70–99)
HGB BLD-MCNC: 11 G/DL (ref 11.7–15.7)

## 2017-09-15 PROCEDURE — 97116 GAIT TRAINING THERAPY: CPT | Mod: GP

## 2017-09-15 PROCEDURE — 82947 ASSAY GLUCOSE BLOOD QUANT: CPT | Performed by: ORTHOPAEDIC SURGERY

## 2017-09-15 PROCEDURE — A9270 NON-COVERED ITEM OR SERVICE: HCPCS | Mod: GY | Performed by: PHYSICIAN ASSISTANT

## 2017-09-15 PROCEDURE — 25000132 ZZH RX MED GY IP 250 OP 250 PS 637: Mod: GY | Performed by: PHYSICIAN ASSISTANT

## 2017-09-15 PROCEDURE — 97161 PT EVAL LOW COMPLEX 20 MIN: CPT | Mod: GP

## 2017-09-15 PROCEDURE — 97110 THERAPEUTIC EXERCISES: CPT | Mod: GP

## 2017-09-15 PROCEDURE — 12000007 ZZH R&B INTERMEDIATE

## 2017-09-15 PROCEDURE — A9270 NON-COVERED ITEM OR SERVICE: HCPCS | Mod: GY | Performed by: ORTHOPAEDIC SURGERY

## 2017-09-15 PROCEDURE — 97530 THERAPEUTIC ACTIVITIES: CPT | Mod: GP

## 2017-09-15 PROCEDURE — 25000132 ZZH RX MED GY IP 250 OP 250 PS 637: Mod: GY | Performed by: ORTHOPAEDIC SURGERY

## 2017-09-15 PROCEDURE — 85018 HEMOGLOBIN: CPT | Performed by: ORTHOPAEDIC SURGERY

## 2017-09-15 PROCEDURE — 25000128 H RX IP 250 OP 636: Performed by: ORTHOPAEDIC SURGERY

## 2017-09-15 PROCEDURE — 40000193 ZZH STATISTIC PT WARD VISIT

## 2017-09-15 PROCEDURE — 36415 COLL VENOUS BLD VENIPUNCTURE: CPT | Performed by: ORTHOPAEDIC SURGERY

## 2017-09-15 RX ADMIN — OXYCODONE HYDROCHLORIDE 5 MG: 5 TABLET ORAL at 11:35

## 2017-09-15 RX ADMIN — OXYCODONE HYDROCHLORIDE 10 MG: 5 TABLET ORAL at 23:33

## 2017-09-15 RX ADMIN — LOSARTAN POTASSIUM AND HYDROCHLOROTHIAZIDE 1 TABLET: 50; 12.5 TABLET, FILM COATED ORAL at 20:40

## 2017-09-15 RX ADMIN — SENNOSIDES AND DOCUSATE SODIUM 2 TABLET: 8.6; 5 TABLET ORAL at 20:40

## 2017-09-15 RX ADMIN — CEFAZOLIN SODIUM 1 G: 1 INJECTION, POWDER, FOR SOLUTION INTRAMUSCULAR; INTRAVENOUS at 01:26

## 2017-09-15 RX ADMIN — OXYCODONE HYDROCHLORIDE 10 MG: 5 TABLET ORAL at 17:53

## 2017-09-15 RX ADMIN — ENOXAPARIN SODIUM 40 MG: 40 INJECTION SUBCUTANEOUS at 17:53

## 2017-09-15 RX ADMIN — ACETAMINOPHEN 975 MG: 325 TABLET, FILM COATED ORAL at 23:33

## 2017-09-15 RX ADMIN — OXYCODONE HYDROCHLORIDE 10 MG: 5 TABLET ORAL at 14:24

## 2017-09-15 RX ADMIN — OXYCODONE HYDROCHLORIDE 10 MG: 5 TABLET ORAL at 08:11

## 2017-09-15 RX ADMIN — OXYCODONE HYDROCHLORIDE 5 MG: 5 TABLET ORAL at 03:42

## 2017-09-15 RX ADMIN — OXYCODONE HYDROCHLORIDE 10 MG: 5 TABLET ORAL at 20:40

## 2017-09-15 RX ADMIN — ACETAMINOPHEN 975 MG: 325 TABLET, FILM COATED ORAL at 13:19

## 2017-09-15 RX ADMIN — METOPROLOL SUCCINATE 25 MG: 25 TABLET, EXTENDED RELEASE ORAL at 08:11

## 2017-09-15 RX ADMIN — CYCLOBENZAPRINE HYDROCHLORIDE 5 MG: 5 TABLET, FILM COATED ORAL at 13:21

## 2017-09-15 RX ADMIN — ACETAMINOPHEN 975 MG: 325 TABLET, FILM COATED ORAL at 05:43

## 2017-09-15 RX ADMIN — SENNOSIDES AND DOCUSATE SODIUM 1 TABLET: 8.6; 5 TABLET ORAL at 08:11

## 2017-09-15 RX ADMIN — CEFAZOLIN SODIUM 1 G: 1 INJECTION, POWDER, FOR SOLUTION INTRAMUSCULAR; INTRAVENOUS at 10:14

## 2017-09-15 NOTE — PROGRESS NOTES
ORTHOPEDIC PROGRESS NOTE LOWER EXTREMITY    PROCEDURE: right knee replacement  POD 1  PAIN: minimal so far blocks still working  NAUSEA: absent    Blood pressure 116/58, pulse 63, temperature 98.1  F (36.7  C), temperature source Axillary, resp. rate 16, height 1.524 m (5'), weight 66.7 kg (147 lb 0.8 oz), SpO2 98 %, not currently breastfeeding.    Temp (24hrs), Av.7  F (36.5  C), Min:97.2  F (36.2  C), Max:98.8  F (37.1  C)    Body mass index is 28.72 kg/(m^2).          Intake/Output Summary (Last 24 hours) at 09/15/17 0749  Last data filed at 09/15/17 0732   Gross per 24 hour   Intake             3240 ml   Output             1450 ml   Net             1790 ml       Patient Vitals for the past 48 hrs:   BP Temp Temp src Pulse Heart Rate Resp SpO2 Height Weight   09/15/17 0731 116/58 98.1  F (36.7  C) Axillary 63 - 16 98 % - -   09/15/17 0425 - - - - - 16 - - -   09/15/17 0400 111/60 98  F (36.7  C) Oral 60 60 16 99 % - -   09/15/17 0342 - - - - - 18 - - -   17 2347 121/62 98  F (36.7  C) Oral - 79 17 97 % - -   17 2242 114/58 - - - 80 - - - -   17 2145 136/82 - - - 74 - - - -   17 204 134/81 - - - 71 - - - -   17 133/72 - - - 67 - - - -   17 194 134/79 97.7  F (36.5  C) Oral - 72 18 98 % - -   17 140/85 97.7  F (36.5  C) - - 67 20 95 % - -   17 192 140/77 97.5  F (36.4  C) - - 68 30 (!) 89 % - -   17 143/73 97.5  F (36.4  C) - - 70 26 93 % - -   17 1900 136/76 97.5  F (36.4  C) - - 71 12 94 % - -   17 1850 140/78 97.3  F (36.3  C) - - 70 20 99 % - -   17 1840 136/75 97.3  F (36.3  C) - - 71 19 97 % - -   17 1830 138/73 97.2  F (36.2  C) - - 71 19 97 % - -   17 1820 124/67 97.2  F (36.2  C) Temporal - 76 15 94 % - -   17 1815 116/71 - - - - - 95 % - -   17 1810 116/71 - - - - - - - -   17 1500 118/65 - - 70 - 16 - - -   17 1413 105/68 98.8  F (37.1  C) - 69 - 16 99 % 1.524 m (5') 66.7 kg  (147 lb 0.8 oz)        Recent Labs   Lab Test  09/15/17   0645  09/14/17   1358  12/24/16   0600   HGB  11.0*  12.2  12.0     Recent Labs   Lab Test  02/20/10   0620  02/19/10   0640  02/18/10   0627   INR  1.51*  1.22*  1.05     Recent Labs   Lab Test  09/14/17   1358  12/24/16   0600  12/23/16   1330   PLT  310  240  319     Recent Labs   Lab Test  12/24/16   0600  12/23/16   1330  11/13/14   1145   WBC  12.2*  20.3*  7.5     Invalid input(s): K      EXAM   The patient is healthy, alert and no distress  Calves are soft and non-tender.   Sensation is intact.  Dorsiflexion and plantar flexion is intact.   The incision is dry and intact.     Patient Active Problem List   Diagnosis     Nonspecific abnormal electrocardiogram (ECG) (EKG)     Hypertension     Hyperlipidemia     Family history of heart disease     Coronary artery disease     Acute diverticulitis     Arthritis of knee, right         ASSESSMENT  Doing well    PLAN  Warned her about pain coming      Constantnio Andrews M.D  [facility]  [address] [place] [id number]

## 2017-09-15 NOTE — PROGRESS NOTES
Consulted on an 82 year old patient with HTN and osteoarthritis who is s/p right TKA. Surgery performed by Dr. Constantino Andrews. EBL 25 ccs. Pt tolerated well. Chart reviewed. Vitals WNL. PTA losartan/HCTZ and metoprolol reordered by primary service with parameters in place. Per RN, no acute concerns.     Given the above, will defer formal consult. Routine post-operative cares, IVF, DVT prophylaxis, and pain management to orthopedic service. Recommend judicious use of narcotics given patient's age and risk for delirium. Will check some basic lab work in the AM to assess for acute blood loss anemia given surgery. PT and OT to assess per Ortho. Bowel regimen in place while on pain regimen. No changes to PTA medication regimen at discharge unless issues arise throughout stay.  Happy to be reconsulted in the future if necessary. Appreciate consult.

## 2017-09-15 NOTE — PLAN OF CARE
Problem: Goal Outcome Summary  Goal: Goal Outcome Summary  Outcome: Improving  Patient arrived to unit from PACU at 1945. A&Ox4. VS stable. Gilbert in place. Tolerating liquids and crackers, advanced to regular diet. IV dilaudid given x1 for pain management.

## 2017-09-15 NOTE — PLAN OF CARE
Problem: Goal Outcome Summary  Goal: Goal Outcome Summary  Physical Therapy: Order received, evaluation completed and treatment initiated. Pt is an 82 year old female admitted for right TKA by Dr. Andrews on 9/14/17. At baseline, patient reports that she lives in a home by herself, 2 steps to enter and 14 steps to bedroom. Pt reports that she was independent with mobility and ALDs at baseline, no use of AD.     Discharge Planner PT   Patient plan for discharge: Home with HHPT  Current status: Pt requires SBA for bed mobility. Pt able to perform sit to/from stand with CGA. Pt ambulated 100' with FWW with KI donned with CGA. Pts right knee AAROM 5-25 degrees.  Barriers to return to prior living situation: Lives alone, steps to enter and within home, decreased activity tolerance.  Recommendations for discharge: TBD POD #2  Rationale for recommendations: TBD POD#2       Entered by: Kathe Carbajal 09/15/2017 10:20 AM

## 2017-09-15 NOTE — PLAN OF CARE
Problem: Goal Outcome Summary  Goal: Goal Outcome Summary  Outcome: No Change  Pt up with 1, oxycodone for pain, voiding in bathroom. Progressing toward plan of care.

## 2017-09-15 NOTE — PLAN OF CARE
Problem: Goal Outcome Summary  Goal: Goal Outcome Summary  Outcome: Improving  A&O x4, VSS , CMS intact, Activity w/1 assist, hartmann intact, Pain controlled w/ oxycodone, Diet reg, pt was dangled and did well. Continue to monitor.
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
## 2017-09-16 ENCOUNTER — APPOINTMENT (OUTPATIENT)
Dept: PHYSICAL THERAPY | Facility: CLINIC | Age: 82
DRG: 470 | End: 2017-09-16
Attending: ORTHOPAEDIC SURGERY
Payer: MEDICARE

## 2017-09-16 ENCOUNTER — APPOINTMENT (OUTPATIENT)
Dept: OCCUPATIONAL THERAPY | Facility: CLINIC | Age: 82
DRG: 470 | End: 2017-09-16
Attending: ORTHOPAEDIC SURGERY
Payer: MEDICARE

## 2017-09-16 LAB
GLUCOSE SERPL-MCNC: 111 MG/DL (ref 70–99)
HGB BLD-MCNC: 10.7 G/DL (ref 11.7–15.7)

## 2017-09-16 PROCEDURE — 25000128 H RX IP 250 OP 636: Performed by: ORTHOPAEDIC SURGERY

## 2017-09-16 PROCEDURE — 97165 OT EVAL LOW COMPLEX 30 MIN: CPT | Mod: GO | Performed by: OCCUPATIONAL THERAPIST

## 2017-09-16 PROCEDURE — 40000193 ZZH STATISTIC PT WARD VISIT: Performed by: PHYSICAL THERAPY ASSISTANT

## 2017-09-16 PROCEDURE — A9270 NON-COVERED ITEM OR SERVICE: HCPCS | Mod: GY | Performed by: ORTHOPAEDIC SURGERY

## 2017-09-16 PROCEDURE — 40000133 ZZH STATISTIC OT WARD VISIT: Performed by: OCCUPATIONAL THERAPIST

## 2017-09-16 PROCEDURE — 12000007 ZZH R&B INTERMEDIATE

## 2017-09-16 PROCEDURE — 85018 HEMOGLOBIN: CPT | Performed by: ORTHOPAEDIC SURGERY

## 2017-09-16 PROCEDURE — 97535 SELF CARE MNGMENT TRAINING: CPT | Mod: GO | Performed by: OCCUPATIONAL THERAPIST

## 2017-09-16 PROCEDURE — 25000132 ZZH RX MED GY IP 250 OP 250 PS 637: Mod: GY | Performed by: PHYSICIAN ASSISTANT

## 2017-09-16 PROCEDURE — 97110 THERAPEUTIC EXERCISES: CPT | Mod: GP | Performed by: PHYSICAL THERAPY ASSISTANT

## 2017-09-16 PROCEDURE — 97116 GAIT TRAINING THERAPY: CPT | Mod: GP | Performed by: PHYSICAL THERAPY ASSISTANT

## 2017-09-16 PROCEDURE — 97530 THERAPEUTIC ACTIVITIES: CPT | Mod: GO | Performed by: OCCUPATIONAL THERAPIST

## 2017-09-16 PROCEDURE — 25000132 ZZH RX MED GY IP 250 OP 250 PS 637: Mod: GY | Performed by: ORTHOPAEDIC SURGERY

## 2017-09-16 PROCEDURE — A9270 NON-COVERED ITEM OR SERVICE: HCPCS | Mod: GY | Performed by: PHYSICIAN ASSISTANT

## 2017-09-16 PROCEDURE — 82947 ASSAY GLUCOSE BLOOD QUANT: CPT | Performed by: ORTHOPAEDIC SURGERY

## 2017-09-16 PROCEDURE — 36415 COLL VENOUS BLD VENIPUNCTURE: CPT | Performed by: ORTHOPAEDIC SURGERY

## 2017-09-16 RX ADMIN — METOPROLOL SUCCINATE 25 MG: 25 TABLET, EXTENDED RELEASE ORAL at 08:30

## 2017-09-16 RX ADMIN — OXYCODONE HYDROCHLORIDE 10 MG: 5 TABLET ORAL at 18:54

## 2017-09-16 RX ADMIN — ACETAMINOPHEN 975 MG: 325 TABLET, FILM COATED ORAL at 13:39

## 2017-09-16 RX ADMIN — OXYCODONE HYDROCHLORIDE 10 MG: 5 TABLET ORAL at 08:31

## 2017-09-16 RX ADMIN — ACETAMINOPHEN 975 MG: 325 TABLET, FILM COATED ORAL at 05:09

## 2017-09-16 RX ADMIN — CYCLOBENZAPRINE HYDROCHLORIDE 5 MG: 5 TABLET, FILM COATED ORAL at 22:00

## 2017-09-16 RX ADMIN — OXYCODONE HYDROCHLORIDE 10 MG: 5 TABLET ORAL at 03:11

## 2017-09-16 RX ADMIN — ACETAMINOPHEN 975 MG: 325 TABLET, FILM COATED ORAL at 22:00

## 2017-09-16 RX ADMIN — OXYCODONE HYDROCHLORIDE 10 MG: 5 TABLET ORAL at 15:58

## 2017-09-16 RX ADMIN — OXYCODONE HYDROCHLORIDE 10 MG: 5 TABLET ORAL at 12:43

## 2017-09-16 RX ADMIN — SENNOSIDES AND DOCUSATE SODIUM 2 TABLET: 8.6; 5 TABLET ORAL at 08:31

## 2017-09-16 RX ADMIN — OXYCODONE HYDROCHLORIDE 10 MG: 5 TABLET ORAL at 22:00

## 2017-09-16 RX ADMIN — CYCLOBENZAPRINE HYDROCHLORIDE 5 MG: 5 TABLET, FILM COATED ORAL at 05:09

## 2017-09-16 RX ADMIN — CYCLOBENZAPRINE HYDROCHLORIDE 5 MG: 5 TABLET, FILM COATED ORAL at 12:45

## 2017-09-16 RX ADMIN — ENOXAPARIN SODIUM 40 MG: 40 INJECTION SUBCUTANEOUS at 17:54

## 2017-09-16 ASSESSMENT — ACTIVITIES OF DAILY LIVING (ADL): PREVIOUS_RESPONSIBILITIES: MEAL PREP;HOUSEKEEPING;LAUNDRY;SHOPPING;YARDWORK;MEDICATION MANAGEMENT;FINANCES

## 2017-09-16 NOTE — PLAN OF CARE
Problem: Goal Outcome Summary  Goal: Goal Outcome Summary  Outcome: Improving  Patient up with assist of 1 and walker. Adequate I/O. Pain managed with oral pain meds.

## 2017-09-16 NOTE — PLAN OF CARE
Problem: Goal Outcome Summary  Goal: Goal Outcome Summary  OT: Attempted eval, pt declined at this time due to high pain level 7/10, and fatigue, pt having difficult time keeping eyes open, pt asked OT to reschedule.   PM: Second attempt made, pt with other disciplines/cares.

## 2017-09-16 NOTE — PROGRESS NOTES
ORTHOPEDIC PROGRESS NOTE LOWER EXTREMITY    PROCEDURE: right knee replacement  POD 2  PAIN: moderate  NAUSEA: absent    Blood pressure 119/65, pulse 83, temperature 98  F (36.7  C), temperature source Oral, resp. rate 16, height 1.524 m (5'), weight 66.7 kg (147 lb 0.8 oz), SpO2 93 %, not currently breastfeeding.  EXAM   The patient is healthy, alert and no distress  Calves are soft and non-tender.   Sensation is intact.  Dorsiflexion and plantar flexion is intact.  Dorsalis pedis pulses intact.   The incision is dry and intact.     ASSESSMENT  S/p right total knee arthroplasty  - doing well; no new concerns.     PLAN  Discharge home with [facility] per [facility] plan   Pain well controlled; continue with oxycodone and cyclobenzaprine   WBAT; Activity as tolerated; encourage mobility   Lovenox, PCD, and ambulation for DVT prophylaxis; will d/c on Lovenox   Hgb stable: continue to monitor   Normal diet; advance as tolerated and encourage oral fluid intake   Encourage incentive spirometry     Patient Active Problem List   Diagnosis     Nonspecific abnormal electrocardiogram (ECG) (EKG)     Hypertension     Hyperlipidemia     Family history of heart disease     Coronary artery disease     Acute diverticulitis     Arthritis of knee, right         Temp (24hrs), Av.9  F (36.6  C), Min:97.3  F (36.3  C), Max:98.9  F (37.2  C)    Body mass index is 28.72 kg/(m^2).      Intake/Output Summary (Last 24 hours) at 17 0732  Last data filed at 09/15/17 1800   Gross per 24 hour   Intake              700 ml   Output              500 ml   Net              200 ml        Recent Labs   Lab Test  17   0634  09/15/17   0645  17   1358   HGB  10.7*  11.0*  12.2     Recent Labs   Lab Test  02/20/10   0620  02/19/10   0640  02/18/10   0627   INR  1.51*  1.22*  1.05     Recent Labs   Lab Test  17   1358  16   0600  16   1330   PLT  310  240  319     Recent Labs   Lab Test  16   0600  16    1330  11/13/14   1145   WBC  12.2*  20.3*  7.5     Invalid input(s): SANTI CARBAJALC   [facility]  [phone]

## 2017-09-16 NOTE — PLAN OF CARE
Problem: Goal Outcome Summary  Goal: Goal Outcome Summary  OT: Order received, evaluation completed, and treatment initiated. Pt is an 82 year old female admitted for right TKA on 9/14/17. At baseline, patient reports that she lives in a home by herself, 2 steps to enter and 14 steps to bedroom. Pt reports that she was independent with mobility and ALDs at baseline, no use of AD, pt reports she has supportive family and friends, pt manages her own medications and finances.      Discharge Planner OT   Patient plan for discharge: Home with [facility] per BPCI plan  Current status: Pt completed bed mobility with SBA-MIN A, pt educated in use of leg , pt demonstrated increased IND in bed mobility with device. Pt completed ed in LE dressing skills with reacher and sock aid with MOD IND. Pt completed ambulation with CGA and 2WW, stood at sink for G/H with SBA, no LOB, some VC needed to navigate small spaces.   Barriers to return to prior living situation: Stairs (being addressed by PT)  Recommendations for discharge: Defer to BPCI plan  Rationale for recommendations: defer to BPCI plan       Entered by: Christina Mckeon 09/16/2017 4:44 PM

## 2017-09-16 NOTE — PLAN OF CARE
Problem: Goal Outcome Summary  Goal: Goal Outcome Summary  Outcome: Improving  A&O x4, VSS , CMS intact, Activity w/1, Voiding BR, Pain controlled w/ oxycodone, Diet reg, Continue to monitor.

## 2017-09-16 NOTE — PROGRESS NOTES
09/16/17 1600   Living Environment   Lives With alone   Living Arrangements house   Home Accessibility stairs to enter home;stairs within home;bed not on first floor   Number of Stairs to Enter Home 2   Number of Stairs Within Home 14   Stair Railings at Home inside, present on right side;outside, present on right side   Transportation Available car;family or friend will provide   Living Environment Comment Pt lives in a house with stairs, pt reports she has support and will have HHA at discharge, tub/shower combination, raised toilet seat, and reacher.    Self-Care   Dominant Hand right   Usual Activity Tolerance good   Current Activity Tolerance moderate   Regular Exercise no   Equipment Currently Used at Home none   Functional Level Prior   Ambulation 0-->independent   Transferring 0-->independent   Toileting 0-->independent   Bathing 0-->independent   Dressing 0-->independent   Eating 0-->independent   Communication 0-->understands/communicates without difficulty   Swallowing 0-->swallows foods/liquids without difficulty   Cognition 0 - no cognition issues reported   Fall history within last six months no   Which of the above functional risks had a recent onset or change? none   Prior Functional Level Comment Pt reports that she lives independently in a home, no AD at baseline., has some equipment from previous surgeries, but has not needed to use it   General Information   Onset of Illness/Injury or Date of Surgery - Date 09/14/17   Referring Physician Constantino Andrews MD   Patient/Family Goals Statement None specifically stated   Additional Occupational Profile Info/Pertinent History of Current Problem Pt is an 82 year old female admitted for right TKA on 9/14/17.    Precautions/Limitations fall precautions   Weight-Bearing Status - RLE weight-bearing as tolerated   Cognitive Status Examination   Orientation orientation to person, place and time   Cognitive Comment Pt demonstrates reduced abstraction,  some repeating of thoughts or tangential methods of thinking   Visual Perception   Visual Perception Wears glasses   Sensory Examination   Sensory Quick Adds No deficits were identified   Pain Assessment   Patient Currently in Pain Yes, see Vital Sign flowsheet   Range of Motion (ROM)   ROM Comment BUE WFL   Strength   Strength Comments BUE WFL   Coordination   Upper Extremity Coordination No deficits were identified   Mobility   Bed Mobility Comments MIN A   Transfer Skills   Transfer Comments CGA   Transfer Skill: Bed to Chair/Chair to Bed   Level of [place]: Bed to Chair contact guard   Physical Assist/Nonphysical Assist: Bed to Chair supervision;verbal cues   Weight-Bearing Restrictions weight-bearing as tolerated   Assistive Device - Transfer Skill Bed to Chair Chair to Bed Rehab Eval rolling walker   Transfer Skill: Sit to Stand   Level of [place]: Sit/Stand contact guard   Physical Assist/Nonphysical Assist: Sit/Stand supervision   Transfer Skill: Sit to Stand weight-bearing as tolerated   Assistive Device for Transfer: Sit/Stand rolling walker   Transfer Skill: Toilet Transfer   Level of [place]: Toilet contact guard   Physical Assist/Nonphysical Assist: Toilet supervision   Weight-Bearing Restrictions: Toilet weight-bearing as tolerated   Assistive Device rolling walker;seat riser;grab bars   Balance   Balance Comments Reduced static and dynamic balance   Bathing   Level of [place] stand-by assist   Upper Body Dressing   Level of [place]: Dress Upper Body independent   Lower Body Dressing   Level of [place]: Dress Lower Body minimum assist (75% patients effort)   Toileting   Level of [place]: Toilet stand-by assist   Grooming   Level of [place]: Grooming contact guard   Eating/Self Feeding   Level of [place]: Eating independent   Instrumental Activities of Daily Living (IADL)   Previous Responsibilities meal prep;housekeeping;laundry;shopping;yardwork;medication  "management;finances   Activities of Daily Living Analysis   Impairments Contributing to Impaired Activities of Daily Living balance impaired;fear and anxiety;pain;ROM decreased;strength decreased   General Therapy Interventions   Planned Therapy Interventions ADL retraining   Clinical Impression   Criteria for Skilled Therapeutic Interventions Met yes, treatment indicated   OT Diagnosis Reduced IND in ADLs   Influenced by the following impairments balance impaired;fear and anxiety;pain;ROM decreased;strength decreased   Assessment of Occupational Performance 1-3 Performance Deficits   Identified Performance Deficits dressing, toileting, bathing   Clinical Decision Making (Complexity) Low complexity   Therapy Frequency daily   Predicted Duration of Therapy Intervention (days/wks) 2 days   Anticipated Discharge Disposition Home with Assist   Risks and Benefits of Treatment have been explained. Yes   Patient, Family & other staff in agreement with plan of care Yes   [facility] TM \"6 Clicks\"   2016, Trustees of [facility], under license to [employer].  All rights reserved.   6 Clicks Short Forms Basic Mobility Inpatient Short Form   [facility]  \"6 Clicks\" Daily Activity Inpatient Short Form   1. Putting on and taking off regular lower body clothing? 3 - A Little   2. Bathing (including washing, rinsing, drying)? 3 - A Little   3. Toileting, which includes using toilet, bedpan or urinal? 3 - A Little   4. Putting on and taking off regular upper body clothing? 4 - None   5. Taking care of personal grooming such as brushing teeth? 4 - None   6. Eating meals? 4 - None   Daily Activity Raw Score (Score out of 24.Lower scores equate to lower levels of function) 21   Total Evaluation Time   Total Evaluation Time (Minutes) 5     "

## 2017-09-16 NOTE — PLAN OF CARE
Problem: Goal Outcome Summary  Goal: Goal Outcome Summary  Discharge Planner PT   Patient plan for discharge: BPCI plan- Home with HHPT  Current status: Pt performed bed mobility with min assist and sit to/from stand transfers with SBA.  Pt performed gait training 15 ft x 1 and 60 ft x 1 using wheeled walker and CGA.  Knee AAROM is 10-50 degrees.  Very guarded with ROM.  Barriers to return to prior living situation: Lives alone, steps to enter and within home, decreased activity tolerance.  Recommendations for discharge: Per BPCI plan  Rationale for recommendations: Per BPCI plan       Entered by: Misa Vásquez 09/16/2017 9:32 AM

## 2017-09-16 NOTE — PLAN OF CARE
Problem: Goal Outcome Summary  Goal: Goal Outcome Summary  Outcome: Improving  Up with one. CMS intact. Voiding. Dressing changed. Taking flexaril and oxycodone for pain. Stable-progressing per pathway.

## 2017-09-17 ENCOUNTER — APPOINTMENT (OUTPATIENT)
Dept: PHYSICAL THERAPY | Facility: CLINIC | Age: 82
DRG: 470 | End: 2017-09-17
Attending: ORTHOPAEDIC SURGERY
Payer: MEDICARE

## 2017-09-17 ENCOUNTER — APPOINTMENT (OUTPATIENT)
Dept: OCCUPATIONAL THERAPY | Facility: CLINIC | Age: 82
DRG: 470 | End: 2017-09-17
Attending: ORTHOPAEDIC SURGERY
Payer: MEDICARE

## 2017-09-17 VITALS
HEART RATE: 83 BPM | SYSTOLIC BLOOD PRESSURE: 95 MMHG | HEIGHT: 60 IN | DIASTOLIC BLOOD PRESSURE: 57 MMHG | WEIGHT: 147.05 LBS | OXYGEN SATURATION: 94 % | BODY MASS INDEX: 28.87 KG/M2 | TEMPERATURE: 97.7 F | RESPIRATION RATE: 16 BRPM

## 2017-09-17 LAB
CREAT SERPL-MCNC: 0.76 MG/DL (ref 0.52–1.04)
GFR SERPL CREATININE-BSD FRML MDRD: 73 ML/MIN/1.7M2
PLATELET # BLD AUTO: 235 10E9/L (ref 150–450)

## 2017-09-17 PROCEDURE — 25000132 ZZH RX MED GY IP 250 OP 250 PS 637: Mod: GY | Performed by: PHYSICIAN ASSISTANT

## 2017-09-17 PROCEDURE — A9270 NON-COVERED ITEM OR SERVICE: HCPCS | Mod: GY | Performed by: ORTHOPAEDIC SURGERY

## 2017-09-17 PROCEDURE — 40000133 ZZH STATISTIC OT WARD VISIT: Performed by: OCCUPATIONAL THERAPY ASSISTANT

## 2017-09-17 PROCEDURE — 97530 THERAPEUTIC ACTIVITIES: CPT | Mod: GP | Performed by: PHYSICAL THERAPY ASSISTANT

## 2017-09-17 PROCEDURE — A9270 NON-COVERED ITEM OR SERVICE: HCPCS | Mod: GY | Performed by: PHYSICIAN ASSISTANT

## 2017-09-17 PROCEDURE — 97116 GAIT TRAINING THERAPY: CPT | Mod: GP | Performed by: PHYSICAL THERAPY ASSISTANT

## 2017-09-17 PROCEDURE — 25000132 ZZH RX MED GY IP 250 OP 250 PS 637: Mod: GY | Performed by: ORTHOPAEDIC SURGERY

## 2017-09-17 PROCEDURE — 40000193 ZZH STATISTIC PT WARD VISIT: Performed by: PHYSICAL THERAPY ASSISTANT

## 2017-09-17 PROCEDURE — 97110 THERAPEUTIC EXERCISES: CPT | Mod: GP | Performed by: PHYSICAL THERAPY ASSISTANT

## 2017-09-17 PROCEDURE — 82565 ASSAY OF CREATININE: CPT | Performed by: ORTHOPAEDIC SURGERY

## 2017-09-17 PROCEDURE — 85049 AUTOMATED PLATELET COUNT: CPT | Performed by: ORTHOPAEDIC SURGERY

## 2017-09-17 PROCEDURE — 97535 SELF CARE MNGMENT TRAINING: CPT | Mod: GO | Performed by: OCCUPATIONAL THERAPY ASSISTANT

## 2017-09-17 PROCEDURE — 36415 COLL VENOUS BLD VENIPUNCTURE: CPT | Performed by: ORTHOPAEDIC SURGERY

## 2017-09-17 RX ORDER — CYCLOBENZAPRINE HCL 5 MG
5 TABLET ORAL 3 TIMES DAILY PRN
Qty: 42 TABLET | Refills: 1 | Status: ON HOLD | OUTPATIENT
Start: 2017-09-17 | End: 2018-01-01

## 2017-09-17 RX ORDER — OXYCODONE HYDROCHLORIDE 5 MG/1
5-10 TABLET ORAL EVERY 4 HOURS PRN
Qty: 96 TABLET | Refills: 0 | Status: ON HOLD | OUTPATIENT
Start: 2017-09-17 | End: 2018-01-01

## 2017-09-17 RX ORDER — AMOXICILLIN 250 MG
1-2 CAPSULE ORAL 2 TIMES DAILY PRN
Qty: 100 TABLET | Refills: 1 | Status: ON HOLD | OUTPATIENT
Start: 2017-09-17 | End: 2018-01-01

## 2017-09-17 RX ORDER — ONDANSETRON 4 MG/1
4 TABLET, ORALLY DISINTEGRATING ORAL EVERY 6 HOURS PRN
Qty: 60 TABLET | Refills: 0 | Status: ON HOLD | OUTPATIENT
Start: 2017-09-17 | End: 2018-01-01

## 2017-09-17 RX ADMIN — SENNOSIDES AND DOCUSATE SODIUM 1 TABLET: 8.6; 5 TABLET ORAL at 07:52

## 2017-09-17 RX ADMIN — OXYCODONE HYDROCHLORIDE 10 MG: 5 TABLET ORAL at 09:08

## 2017-09-17 RX ADMIN — OXYCODONE HYDROCHLORIDE 10 MG: 5 TABLET ORAL at 04:08

## 2017-09-17 RX ADMIN — ACETAMINOPHEN 975 MG: 325 TABLET, FILM COATED ORAL at 05:52

## 2017-09-17 RX ADMIN — OXYCODONE HYDROCHLORIDE 10 MG: 5 TABLET ORAL at 01:09

## 2017-09-17 RX ADMIN — METOPROLOL SUCCINATE 25 MG: 25 TABLET, EXTENDED RELEASE ORAL at 08:00

## 2017-09-17 RX ADMIN — CYCLOBENZAPRINE HYDROCHLORIDE 5 MG: 5 TABLET, FILM COATED ORAL at 11:07

## 2017-09-17 NOTE — PLAN OF CARE
Problem: Goal Outcome Summary  Goal: Goal Outcome Summary  Discharge Planner OT   Patient plan for discharge: home  Current status: Pt educated on safety with completing tub transfer with shower doors. Pt will sponge bathe until able to step over tub for transfer. Educated on grab bars as pt feels she will need them to complete transfer, educated on community and online resources to acquire AE. Pt verbalized good understanding of all education.   Barriers to return to prior living situation: stairs  Recommendations for discharge: per BPCI  Rationale for recommendations: per BPCI       Entered by: Nuris Barrett 09/17/2017 11:02 AM      Pt to d/c home today with family assist as needed, GOALS PARTIALLY MET, see discharge summary

## 2017-09-17 NOTE — PLAN OF CARE
Problem: Goal Outcome Summary  Goal: Goal Outcome Summary  Discharge Planner PT   Patient plan for discharge: BPCI plan- Home with HHPT  Current status: Pt performed bed mobility with min assist and sit to/from stand transfers with SBA.  Pt performed gait training 10 ft x 1 and 50 ft x 1 using wheeled walker and SBA.  Declined further gait due to fatigue.  Pt performed 3 stairs x 1 trial using 1 rail and cane with CGA.  Knee AAROM is 10-61 degrees.  Very guarded with ROM.  Per pt and daughter, pt was planning to be home alone with only having family/friends check-in at times.  Strongly advised pt/family line up someone to stay with pt 24 hours a day for the first few days as pt is still requiring assist with mobility and exs.   Barriers to return to prior living situation: Lives alone, steps to enter and within home, decreased activity tolerance.  Recommendations for discharge: Per BPCI plan  Rationale for recommendations: Per BPCI plan       Entered by: Misa Vásquez 09/17/2017 2:32 PM         Pt discharged home today per BPCI plan with assist of family/friends and home PT.  PT goals not met.

## 2017-09-17 NOTE — PLAN OF CARE
Problem: Goal Outcome Summary  Goal: Goal Outcome Summary  Outcome: Improving  A/Ox4. VSS on RA. 10 mg Oxy given x2. Dressing c/d/i. CMS intact. Up with 1 and walker. Regular diet. Bs active and passing gas. Plan to d/c home today

## 2017-09-17 NOTE — PROGRESS NOTES
ORTHOPEDIC PROGRESS NOTE LOWER EXTREMITY    PROCEDURE: right knee replacement  POD 3  PAIN: minimal  NAUSEA: absent    Blood pressure 103/49, pulse 83, temperature 97.7  F (36.5  C), temperature source Oral, resp. rate 16, height 1.524 m (5'), weight 66.7 kg (147 lb 0.8 oz), SpO2 94 %, not currently breastfeeding.  EXAM   The patient is healthy, alert and no distress  Calves are soft and non-tender.   Sensation is intact.  Dorsiflexion and plantar flexion is intact.  Dorsalis pedis pulses intact.   The incision is dry and intact.     ASSESSMENT  S/p right sided total knee arthroplasty  - doing well; no new concerns.     PLAN  Discharge home with [facility] today   Pain well controlled; continue with oxycodone  WBAT; Activity as tolerated; encourage mobility   Lovenox, PCD, and ambulation for DVT prophylaxis   Hgb stable: continue to monitor   Normal diet; advance as tolerated and encourage oral fluid intake   Encourage incentive spirometry     Patient Active Problem List   Diagnosis     Nonspecific abnormal electrocardiogram (ECG) (EKG)     Hypertension     Hyperlipidemia     Family history of heart disease     Coronary artery disease     Acute diverticulitis     Arthritis of knee, right         Temp (24hrs), Av.2  F (36.8  C), Min:97.7  F (36.5  C), Max:98.6  F (37  C)    Body mass index is 28.72 kg/(m^2).      Intake/Output Summary (Last 24 hours) at 17 0645  Last data filed at 17 2145   Gross per 24 hour   Intake              150 ml   Output                0 ml   Net              150 ml        Recent Labs   Lab Test  17   0634  09/15/17   0645  17   1358   HGB  10.7*  11.0*  12.2     Recent Labs   Lab Test  02/20/10   0620  02/19/10   0640  02/18/10   0627   INR  1.51*  1.22*  1.05     Recent Labs   Lab Test  17   1358  16   0600  16   1330   PLT  310  240  319     Recent Labs   Lab Test  16   0600  16   1330  14   1145   WBC  12.2*   20.3*  7.5     Invalid input(s): SANTI Wilks PA-C   [facility]  [phone]

## 2017-09-17 NOTE — DISCHARGE INSTRUCTIONS
".TOTAL KNEE REPLACEMENT TAKE HOME INSTRUCTIONS  Your surgeon will answer any questions about your progress. General guidelines for your care are listed below. Your surgeon may give additional instructions for your care at home. Please follow them carefully    Activity Level  1. Physical activity may be resumed gradually according to your comfort level and your surgeon s instructions. Follow your exercise program as instructed by your therapist. Do exercises at least twice a day. you may ice your knee after exercising.  2. Complete exercises two hours before bedtime to minimize the effect pain may have on sleep.  Refer to pages 19-22 of you \"Total Knee Replacement\" booklet for details  3. Do not wear your knee immobilizer unless your doctor has specifically told you to continue it.    Good Health Practices  1. Maintain an adequate fluid intake and eat a well balanced diet.  2. Be sure to include the basic food groups, such as dairy products, meat/fish, vegetables, and fruit. Each of these foods contribute to your wound healing and increasing your strength.  3. Surgery, decreased activity and pain medication all contribute to a decrease in bowel activity that can result in constipation. It is recommended that you increase your liquid intake, add fiber to your diet, increase activity, and decrease pain medication use. If you have any problems, notify your physician.  4. Wear your anti-embolism stockings day and night until seen by your surgeon if you were issued these at discharge.  (Not all patients will have anti-embolism stockings) Remove twice a day for one hour at a time. You may hand wash and air dry your stockings.  5. Notify your dentist of your total knee surgery and call your dentist one week before a dental appointment for antibiotics, if your dentist will not prescribe antibiotics then call your surgeon to ask for next steps.      Incision/Dressing Care  1. Keep incision clean and dry per surgeons " instructions  2. Cover incision if you are still having drainage.  3.  If you have a waterproof dressing okay to Shower.    Things to Watch For  1. Check incision daily for increased redness, tenderness, swelling, or drainage along the incision line. If these occur, please notify your doctor. Also, call if you develop a fever above 101 .  2. Please notify your doctor if you experience any calf pain and/or if you have surgical pain not relieved by the pain medication prescribed by your doctor.        Revised 05/8/17

## 2017-09-17 NOTE — DISCHARGE SUMMARY
[facility] Discharge Summary    Cristal Wynn MRN# [id number]   Age: 82 year old YOB: 1935     Date of Admission:  9/14/2017  Date of Discharge:  9/17/2017  Admitting Provider:  Constantino Andrews MD  Discharge Provider:  Geovanny Wilks PA-C  [facility]      Primary Provider: Mora Bang  Primary Care Physician Phone Number: [phone]          Admission Diagnoses:   Principle Diagnosis: end stage arthritis right knee  Arthritis of knee, right  Secondary Diagnoses:          Discharge Diagnosis:   end stage arthritis right knee          Procedures:   Procedure(s): Right sided total knee arthroplasty             Discharge Medications:     Current Discharge Medication List      START taking these medications    Details   oxyCODONE (ROXICODONE) 5 MG IR tablet Take 1-2 tablets (5-10 mg) by mouth every 4 hours as needed for moderate to severe pain  Qty: 96 tablet, Refills: 0      enoxaparin (LOVENOX) 40 MG/0.4ML injection Inject 0.4 mLs (40 mg) Subcutaneous every 24 hours  Qty: 14 Syringe, Refills: 0      ondansetron (ZOFRAN-ODT) 4 MG ODT tab Take 1 tablet (4 mg) by mouth every 6 hours as needed for nausea or vomiting  Qty: 60 tablet, Refills: 0      senna-docusate (SENOKOT-S;PERICOLACE) 8.6-50 MG per tablet Take 1-2 tablets by mouth 2 times daily as needed for constipation  Qty: 100 tablet, Refills: 1      cyclobenzaprine (FLEXERIL) 5 MG tablet Take 1 tablet (5 mg) by mouth 3 times daily as needed for muscle spasms  Qty: 42 tablet, Refills: 1      order for DME Equipment being ordered: Walker Wheels () and Walker ()  Treatment Diagnosis: Right total knee replacement  Qty: 1 each, Refills: 0    Associated Diagnoses: Arthritis of knee, right         CONTINUE these medications which have NOT CHANGED    Details   METOPROLOL SUCCINATE ER PO Take 25 mg by mouth daily      Acetaminophen (TYLENOL PO) Take 325-650 mg by mouth every 6 hours as  needed for mild pain or fever      UNKNOWN TO PATIENT Take 3 capsules by mouth daily as needed (1 hour prior to dental appointment) Unknown Antibiotic      losartan-hydrochlorothiazide (HYZAAR) 50-12.5 MG per tablet Take 1 tablet by mouth every evening                  Allergies:         Allergies   Allergen Reactions     Excedrin Back & [Acetaminophen-Aspirin Buffered]      Abdominal pain             Brief History of Illness:   Cristal Wynn presented in clinic with end stage osteoarthritis of the right knee joint. After conservative measures became ineffective, she agreed to proceed with the performed procedure.     After discussing the risks, benefits, and possible complications, informed consent was obtained and the patient underwent the above procedure.  There were no complications.  Please see the Operative Report for full details.           Hospital Course:   Cristal Wynn's hospital course was unremarkable.  She recovered as anticipated and experienced no post-operative complications.     On the date of discharge, the patient was discharged to home in stable condition and afebrile.  She verbalized understanding of all discharge instructions and felt comfortable with the discharge plan.  She was asked to call with any further questions or concerns.         Condition on Discharge:      Discharge condition: Stable   Discharge vitals: Blood pressure 103/49, pulse 83, temperature 97.7  F (36.5  C), temperature source Oral, resp. rate 16, height 1.524 m (5'), weight 66.7 kg (147 lb 0.8 oz), SpO2 94 %, not currently breastfeeding.           Discharge Disposition:   Discharged to home with [facility]          Discharge Instructions and Follow-Up:      Cristal Wynn was asked to follow up with Dr. Andrews in 2 weeks from discharge from the hospital for routine, post-operative visit. Please call [facility] at [phone] to schedule this appointment.       Geovanny Wilks PA-C    [facility]  [phone]

## 2017-09-17 NOTE — PROGRESS NOTES
Late entry for 9/17/2017 at 0830.  Information faxed to [facility] as noted in chart that this patient is BPCI, and care plan states [facility] (as well as MD notes). Rajani Harris RN

## 2017-09-17 NOTE — PLAN OF CARE
Problem: Goal Outcome Summary  Goal: Goal Outcome Summary  Outcome: No Change  VSS RA A&Ox4. Pain managed with oxycodone and flexeril. Up Ax1 GB walker. Tolerating diet, BM yesterday. Aquacel dressing placed. D/c home with family and home therapies. D/c instructions and meds given.

## 2018-01-01 ENCOUNTER — APPOINTMENT (OUTPATIENT)
Dept: GENERAL RADIOLOGY | Facility: CLINIC | Age: 83
DRG: 392 | End: 2018-01-01
Attending: EMERGENCY MEDICINE
Payer: MEDICARE

## 2018-01-01 ENCOUNTER — ONCOLOGY VISIT (OUTPATIENT)
Dept: ONCOLOGY | Facility: CLINIC | Age: 83
End: 2018-01-01
Attending: OBSTETRICS & GYNECOLOGY
Payer: MEDICARE

## 2018-01-01 ENCOUNTER — APPOINTMENT (OUTPATIENT)
Dept: GENERAL RADIOLOGY | Facility: CLINIC | Age: 83
DRG: 392 | End: 2018-01-01
Attending: INTERNAL MEDICINE
Payer: MEDICARE

## 2018-01-01 ENCOUNTER — TELEPHONE (OUTPATIENT)
Dept: CT IMAGING | Facility: CLINIC | Age: 83
End: 2018-01-01

## 2018-01-01 ENCOUNTER — TELEPHONE (OUTPATIENT)
Dept: ONCOLOGY | Facility: CLINIC | Age: 83
End: 2018-01-01

## 2018-01-01 ENCOUNTER — HOSPITAL ENCOUNTER (INPATIENT)
Facility: CLINIC | Age: 83
LOS: 10 days | Discharge: HOME | DRG: 392 | End: 2018-11-11
Attending: EMERGENCY MEDICINE | Admitting: INTERNAL MEDICINE
Payer: MEDICARE

## 2018-01-01 ENCOUNTER — HOSPITAL ENCOUNTER (EMERGENCY)
Facility: CLINIC | Age: 83
Discharge: HOME | End: 2018-11-30
Attending: EMERGENCY MEDICINE | Admitting: EMERGENCY MEDICINE
Payer: MEDICARE

## 2018-01-01 ENCOUNTER — HOSPITAL ENCOUNTER (OUTPATIENT)
Facility: CLINIC | Age: 83
Setting detail: OBSERVATION
Discharge: HOME | End: 2018-10-28
Attending: EMERGENCY MEDICINE | Admitting: HOSPITALIST
Payer: MEDICARE

## 2018-01-01 ENCOUNTER — APPOINTMENT (OUTPATIENT)
Dept: PHYSICAL THERAPY | Facility: CLINIC | Age: 83
DRG: 392 | End: 2018-01-01
Payer: MEDICARE

## 2018-01-01 ENCOUNTER — DOCUMENTATION ONLY (OUTPATIENT)
Dept: OTHER | Facility: CLINIC | Age: 83
End: 2018-01-01

## 2018-01-01 ENCOUNTER — APPOINTMENT (OUTPATIENT)
Dept: CT IMAGING | Facility: CLINIC | Age: 83
End: 2018-01-01
Attending: EMERGENCY MEDICINE
Payer: MEDICARE

## 2018-01-01 ENCOUNTER — APPOINTMENT (OUTPATIENT)
Dept: OCCUPATIONAL THERAPY | Facility: CLINIC | Age: 83
DRG: 392 | End: 2018-01-01
Attending: INTERNAL MEDICINE
Payer: MEDICARE

## 2018-01-01 ENCOUNTER — APPOINTMENT (OUTPATIENT)
Dept: CARDIOLOGY | Facility: CLINIC | Age: 83
DRG: 392 | End: 2018-01-01
Attending: HOSPITALIST
Payer: MEDICARE

## 2018-01-01 ENCOUNTER — APPOINTMENT (OUTPATIENT)
Dept: OCCUPATIONAL THERAPY | Facility: CLINIC | Age: 83
DRG: 392 | End: 2018-01-01
Payer: MEDICARE

## 2018-01-01 ENCOUNTER — APPOINTMENT (OUTPATIENT)
Dept: CT IMAGING | Facility: CLINIC | Age: 83
DRG: 392 | End: 2018-01-01
Attending: INTERNAL MEDICINE
Payer: MEDICARE

## 2018-01-01 ENCOUNTER — APPOINTMENT (OUTPATIENT)
Dept: PHYSICAL THERAPY | Facility: CLINIC | Age: 83
DRG: 392 | End: 2018-01-01
Attending: INTERNAL MEDICINE
Payer: MEDICARE

## 2018-01-01 ENCOUNTER — APPOINTMENT (OUTPATIENT)
Dept: NUCLEAR MEDICINE | Facility: CLINIC | Age: 83
DRG: 392 | End: 2018-01-01
Attending: INTERNAL MEDICINE
Payer: MEDICARE

## 2018-01-01 ENCOUNTER — TELEPHONE (OUTPATIENT)
Dept: INTERVENTIONAL RADIOLOGY/VASCULAR | Facility: CLINIC | Age: 83
End: 2018-01-01

## 2018-01-01 VITALS
SYSTOLIC BLOOD PRESSURE: 170 MMHG | WEIGHT: 160 LBS | BODY MASS INDEX: 26.66 KG/M2 | DIASTOLIC BLOOD PRESSURE: 89 MMHG | HEIGHT: 65 IN | TEMPERATURE: 97.8 F | HEART RATE: 94 BPM | RESPIRATION RATE: 16 BRPM | OXYGEN SATURATION: 90 %

## 2018-01-01 VITALS
RESPIRATION RATE: 16 BRPM | HEIGHT: 59 IN | HEART RATE: 68 BPM | BODY MASS INDEX: 33.07 KG/M2 | TEMPERATURE: 98.2 F | WEIGHT: 164.02 LBS | DIASTOLIC BLOOD PRESSURE: 69 MMHG | SYSTOLIC BLOOD PRESSURE: 125 MMHG | OXYGEN SATURATION: 95 %

## 2018-01-01 VITALS
HEART RATE: 98 BPM | TEMPERATURE: 98.3 F | BODY MASS INDEX: 24.43 KG/M2 | OXYGEN SATURATION: 95 % | WEIGHT: 146.8 LBS | DIASTOLIC BLOOD PRESSURE: 77 MMHG | SYSTOLIC BLOOD PRESSURE: 129 MMHG

## 2018-01-01 VITALS
SYSTOLIC BLOOD PRESSURE: 169 MMHG | WEIGHT: 143 LBS | RESPIRATION RATE: 18 BRPM | TEMPERATURE: 97.3 F | DIASTOLIC BLOOD PRESSURE: 100 MMHG | BODY MASS INDEX: 23.8 KG/M2 | OXYGEN SATURATION: 96 %

## 2018-01-01 DIAGNOSIS — R97.8 ELEVATED TUMOR MARKERS: ICD-10-CM

## 2018-01-01 DIAGNOSIS — R10.13 ABDOMINAL PAIN, EPIGASTRIC: ICD-10-CM

## 2018-01-01 DIAGNOSIS — K29.70 GASTRITIS WITHOUT BLEEDING, UNSPECIFIED CHRONICITY, UNSPECIFIED GASTRITIS TYPE: ICD-10-CM

## 2018-01-01 DIAGNOSIS — K29.50 CHRONIC GASTRITIS WITHOUT BLEEDING, UNSPECIFIED GASTRITIS TYPE: Primary | ICD-10-CM

## 2018-01-01 DIAGNOSIS — R10.84 GENERALIZED ABDOMINAL PAIN: ICD-10-CM

## 2018-01-01 DIAGNOSIS — R18.8 OTHER ASCITES: Primary | ICD-10-CM

## 2018-01-01 DIAGNOSIS — C78.6 PERITONEAL CARCINOMATOSIS (H): Primary | ICD-10-CM

## 2018-01-01 DIAGNOSIS — R11.10 VOMITING, INTRACTABILITY OF VOMITING NOT SPECIFIED, PRESENCE OF NAUSEA NOT SPECIFIED, UNSPECIFIED VOMITING TYPE: ICD-10-CM

## 2018-01-01 DIAGNOSIS — E86.0 DEHYDRATION: ICD-10-CM

## 2018-01-01 DIAGNOSIS — K21.00 GASTROESOPHAGEAL REFLUX DISEASE WITH ESOPHAGITIS: ICD-10-CM

## 2018-01-01 DIAGNOSIS — R79.9 ABNORMAL FINDING OF BLOOD CHEMISTRY: ICD-10-CM

## 2018-01-01 DIAGNOSIS — R79.89 ELEVATED TROPONIN: ICD-10-CM

## 2018-01-01 DIAGNOSIS — K31.84 GASTROPARESIS: Primary | ICD-10-CM

## 2018-01-01 DIAGNOSIS — R18.0 MALIGNANT ASCITES (H): Primary | ICD-10-CM

## 2018-01-01 LAB
ALBUMIN SERPL-MCNC: 2.5 G/DL (ref 3.4–5)
ALBUMIN SERPL-MCNC: 2.6 G/DL (ref 3.4–5)
ALBUMIN SERPL-MCNC: 2.9 G/DL (ref 3.4–5)
ALBUMIN SERPL-MCNC: 3.9 G/DL (ref 3.4–5)
ALBUMIN SERPL-MCNC: 4 G/DL (ref 3.4–5)
ALBUMIN UR-MCNC: 10 MG/DL
ALP SERPL-CCNC: 67 U/L (ref 40–150)
ALP SERPL-CCNC: 72 U/L (ref 40–150)
ALP SERPL-CCNC: 76 U/L (ref 40–150)
ALP SERPL-CCNC: 82 U/L (ref 40–150)
ALP SERPL-CCNC: 85 U/L (ref 40–150)
ALT SERPL W P-5'-P-CCNC: 23 U/L (ref 0–50)
ALT SERPL W P-5'-P-CCNC: 37 U/L (ref 0–50)
ALT SERPL W P-5'-P-CCNC: 53 U/L (ref 0–50)
ALT SERPL W P-5'-P-CCNC: 61 U/L (ref 0–50)
ALT SERPL W P-5'-P-CCNC: 62 U/L (ref 0–50)
ANION GAP SERPL CALCULATED.3IONS-SCNC: 12 MMOL/L (ref 3–14)
ANION GAP SERPL CALCULATED.3IONS-SCNC: 13 MMOL/L (ref 3–14)
ANION GAP SERPL CALCULATED.3IONS-SCNC: 4 MMOL/L (ref 3–14)
ANION GAP SERPL CALCULATED.3IONS-SCNC: 5 MMOL/L (ref 3–14)
ANION GAP SERPL CALCULATED.3IONS-SCNC: 7 MMOL/L (ref 3–14)
ANION GAP SERPL CALCULATED.3IONS-SCNC: 7 MMOL/L (ref 3–14)
ANION GAP SERPL CALCULATED.3IONS-SCNC: 8 MMOL/L (ref 3–14)
ANION GAP SERPL CALCULATED.3IONS-SCNC: 9 MMOL/L (ref 3–14)
APPEARANCE UR: ABNORMAL
APTT PPP: 37 SEC (ref 22–37)
AST SERPL W P-5'-P-CCNC: 27 U/L (ref 0–45)
AST SERPL W P-5'-P-CCNC: 35 U/L (ref 0–45)
AST SERPL W P-5'-P-CCNC: 44 U/L (ref 0–45)
AST SERPL W P-5'-P-CCNC: 46 U/L (ref 0–45)
AST SERPL W P-5'-P-CCNC: 51 U/L (ref 0–45)
BACTERIA #/AREA URNS HPF: ABNORMAL /HPF
BACTERIA SPEC CULT: NORMAL
BASOPHILS # BLD AUTO: 0 10E9/L (ref 0–0.2)
BASOPHILS # BLD AUTO: 0.1 10E9/L (ref 0–0.2)
BASOPHILS NFR BLD AUTO: 0.2 %
BASOPHILS NFR BLD AUTO: 0.3 %
BASOPHILS NFR BLD AUTO: 0.4 %
BASOPHILS NFR BLD AUTO: 0.5 %
BILIRUB DIRECT SERPL-MCNC: <0.1 MG/DL (ref 0–0.2)
BILIRUB SERPL-MCNC: 0.4 MG/DL (ref 0.2–1.3)
BILIRUB SERPL-MCNC: 0.5 MG/DL (ref 0.2–1.3)
BILIRUB SERPL-MCNC: 0.6 MG/DL (ref 0.2–1.3)
BILIRUB UR QL STRIP: NEGATIVE
BUN SERPL-MCNC: 10 MG/DL (ref 7–30)
BUN SERPL-MCNC: 10 MG/DL (ref 7–30)
BUN SERPL-MCNC: 14 MG/DL (ref 7–30)
BUN SERPL-MCNC: 14 MG/DL (ref 7–30)
BUN SERPL-MCNC: 15 MG/DL (ref 7–30)
BUN SERPL-MCNC: 18 MG/DL (ref 7–30)
BUN SERPL-MCNC: 19 MG/DL (ref 7–30)
BUN SERPL-MCNC: 20 MG/DL (ref 7–30)
BUN SERPL-MCNC: 22 MG/DL (ref 7–30)
BUN SERPL-MCNC: 7 MG/DL (ref 7–30)
BUN SERPL-MCNC: 9 MG/DL (ref 7–30)
BUN SERPL-MCNC: 9 MG/DL (ref 7–30)
CALCIUM SERPL-MCNC: 10 MG/DL (ref 8.5–10.1)
CALCIUM SERPL-MCNC: 8.2 MG/DL (ref 8.5–10.1)
CALCIUM SERPL-MCNC: 8.2 MG/DL (ref 8.5–10.1)
CALCIUM SERPL-MCNC: 8.3 MG/DL (ref 8.5–10.1)
CALCIUM SERPL-MCNC: 8.3 MG/DL (ref 8.5–10.1)
CALCIUM SERPL-MCNC: 8.4 MG/DL (ref 8.5–10.1)
CALCIUM SERPL-MCNC: 8.4 MG/DL (ref 8.5–10.1)
CALCIUM SERPL-MCNC: 8.5 MG/DL (ref 8.5–10.1)
CALCIUM SERPL-MCNC: 8.7 MG/DL (ref 8.5–10.1)
CALCIUM SERPL-MCNC: 8.8 MG/DL (ref 8.5–10.1)
CALCIUM SERPL-MCNC: 8.8 MG/DL (ref 8.5–10.1)
CALCIUM SERPL-MCNC: 9.7 MG/DL (ref 8.5–10.1)
CANCER AG125 SERPL-ACNC: 5959 U/ML (ref 0–30)
CHLORIDE SERPL-SCNC: 103 MMOL/L (ref 94–109)
CHLORIDE SERPL-SCNC: 105 MMOL/L (ref 94–109)
CHLORIDE SERPL-SCNC: 106 MMOL/L (ref 94–109)
CHLORIDE SERPL-SCNC: 106 MMOL/L (ref 94–109)
CHLORIDE SERPL-SCNC: 110 MMOL/L (ref 94–109)
CHLORIDE SERPL-SCNC: 111 MMOL/L (ref 94–109)
CHLORIDE SERPL-SCNC: 99 MMOL/L (ref 94–109)
CO2 SERPL-SCNC: 22 MMOL/L (ref 20–32)
CO2 SERPL-SCNC: 24 MMOL/L (ref 20–32)
CO2 SERPL-SCNC: 25 MMOL/L (ref 20–32)
CO2 SERPL-SCNC: 25 MMOL/L (ref 20–32)
CO2 SERPL-SCNC: 26 MMOL/L (ref 20–32)
CO2 SERPL-SCNC: 26 MMOL/L (ref 20–32)
CO2 SERPL-SCNC: 27 MMOL/L (ref 20–32)
CO2 SERPL-SCNC: 28 MMOL/L (ref 20–32)
CO2 SERPL-SCNC: 30 MMOL/L (ref 20–32)
CO2 SERPL-SCNC: 35 MMOL/L (ref 20–32)
COLOR UR AUTO: YELLOW
COPATH REPORT: NORMAL
CREAT SERPL-MCNC: 0.66 MG/DL (ref 0.52–1.04)
CREAT SERPL-MCNC: 0.68 MG/DL (ref 0.52–1.04)
CREAT SERPL-MCNC: 0.7 MG/DL (ref 0.52–1.04)
CREAT SERPL-MCNC: 0.73 MG/DL (ref 0.52–1.04)
CREAT SERPL-MCNC: 0.76 MG/DL (ref 0.52–1.04)
CREAT SERPL-MCNC: 0.77 MG/DL (ref 0.52–1.04)
CREAT SERPL-MCNC: 0.81 MG/DL (ref 0.52–1.04)
CREAT SERPL-MCNC: 0.81 MG/DL (ref 0.52–1.04)
CREAT SERPL-MCNC: 0.83 MG/DL (ref 0.52–1.04)
CREAT SERPL-MCNC: 0.98 MG/DL (ref 0.52–1.04)
CREAT SERPL-MCNC: 1.11 MG/DL (ref 0.52–1.04)
CREAT SERPL-MCNC: 1.15 MG/DL (ref 0.52–1.04)
D DIMER PPP FEU-MCNC: 2.8 UG/ML FEU (ref 0–0.5)
DIFFERENTIAL METHOD BLD: ABNORMAL
DIFFERENTIAL METHOD BLD: ABNORMAL
DIFFERENTIAL METHOD BLD: NORMAL
DIFFERENTIAL METHOD BLD: NORMAL
EOSINOPHIL # BLD AUTO: 0.1 10E9/L (ref 0–0.7)
EOSINOPHIL # BLD AUTO: 0.4 10E9/L (ref 0–0.7)
EOSINOPHIL NFR BLD AUTO: 0.4 %
EOSINOPHIL NFR BLD AUTO: 0.8 %
EOSINOPHIL NFR BLD AUTO: 1.3 %
EOSINOPHIL NFR BLD AUTO: 5.5 %
ERYTHROCYTE [DISTWIDTH] IN BLOOD BY AUTOMATED COUNT: 12.6 % (ref 10–15)
ERYTHROCYTE [DISTWIDTH] IN BLOOD BY AUTOMATED COUNT: 12.7 % (ref 10–15)
ERYTHROCYTE [DISTWIDTH] IN BLOOD BY AUTOMATED COUNT: 12.8 % (ref 10–15)
ERYTHROCYTE [DISTWIDTH] IN BLOOD BY AUTOMATED COUNT: 12.9 % (ref 10–15)
ERYTHROCYTE [DISTWIDTH] IN BLOOD BY AUTOMATED COUNT: 13.4 % (ref 10–15)
ERYTHROCYTE [DISTWIDTH] IN BLOOD BY AUTOMATED COUNT: 13.8 % (ref 10–15)
GFR SERPL CREATININE-BSD FRML MDRD: 45 ML/MIN/1.7M2
GFR SERPL CREATININE-BSD FRML MDRD: 47 ML/MIN/1.7M2
GFR SERPL CREATININE-BSD FRML MDRD: 54 ML/MIN/1.7M2
GFR SERPL CREATININE-BSD FRML MDRD: 66 ML/MIN/1.7M2
GFR SERPL CREATININE-BSD FRML MDRD: 67 ML/MIN/1.7M2
GFR SERPL CREATININE-BSD FRML MDRD: 68 ML/MIN/1.7M2
GFR SERPL CREATININE-BSD FRML MDRD: 72 ML/MIN/1.7M2
GFR SERPL CREATININE-BSD FRML MDRD: 73 ML/MIN/1.7M2
GFR SERPL CREATININE-BSD FRML MDRD: 77 ML/MIN/1.7M2
GFR SERPL CREATININE-BSD FRML MDRD: 80 ML/MIN/1.7M2
GFR SERPL CREATININE-BSD FRML MDRD: 83 ML/MIN/1.7M2
GFR SERPL CREATININE-BSD FRML MDRD: 85 ML/MIN/1.7M2
GLUCOSE SERPL-MCNC: 101 MG/DL (ref 70–99)
GLUCOSE SERPL-MCNC: 103 MG/DL (ref 70–99)
GLUCOSE SERPL-MCNC: 114 MG/DL (ref 70–99)
GLUCOSE SERPL-MCNC: 83 MG/DL (ref 70–99)
GLUCOSE SERPL-MCNC: 84 MG/DL (ref 70–99)
GLUCOSE SERPL-MCNC: 85 MG/DL (ref 70–99)
GLUCOSE SERPL-MCNC: 86 MG/DL (ref 70–99)
GLUCOSE SERPL-MCNC: 87 MG/DL (ref 70–99)
GLUCOSE SERPL-MCNC: 87 MG/DL (ref 70–99)
GLUCOSE SERPL-MCNC: 88 MG/DL (ref 70–99)
GLUCOSE SERPL-MCNC: 96 MG/DL (ref 70–99)
GLUCOSE SERPL-MCNC: 98 MG/DL (ref 70–99)
GLUCOSE UR STRIP-MCNC: NEGATIVE MG/DL
HCT VFR BLD AUTO: 35.9 % (ref 35–47)
HCT VFR BLD AUTO: 36.6 % (ref 35–47)
HCT VFR BLD AUTO: 37.2 % (ref 35–47)
HCT VFR BLD AUTO: 37.9 % (ref 35–47)
HCT VFR BLD AUTO: 37.9 % (ref 35–47)
HCT VFR BLD AUTO: 38.8 % (ref 35–47)
HCT VFR BLD AUTO: 42.3 % (ref 35–47)
HCT VFR BLD AUTO: 43.1 % (ref 35–47)
HGB BLD-MCNC: 11.8 G/DL (ref 11.7–15.7)
HGB BLD-MCNC: 11.8 G/DL (ref 11.7–15.7)
HGB BLD-MCNC: 12.2 G/DL (ref 11.7–15.7)
HGB BLD-MCNC: 12.3 G/DL (ref 11.7–15.7)
HGB BLD-MCNC: 12.8 G/DL (ref 11.7–15.7)
HGB BLD-MCNC: 13.1 G/DL (ref 11.7–15.7)
HGB BLD-MCNC: 14.5 G/DL (ref 11.7–15.7)
HGB BLD-MCNC: 14.7 G/DL (ref 11.7–15.7)
HGB UR QL STRIP: NEGATIVE
HYALINE CASTS #/AREA URNS LPF: 9 /LPF (ref 0–2)
IMM GRANULOCYTES # BLD: 0 10E9/L (ref 0–0.4)
IMM GRANULOCYTES NFR BLD: 0.2 %
IMM GRANULOCYTES NFR BLD: 0.3 %
IMM GRANULOCYTES NFR BLD: 0.3 %
IMM GRANULOCYTES NFR BLD: 0.4 %
INR PPP: 1.01 (ref 0.86–1.14)
INTERPRETATION ECG - MUSE: NORMAL
KETONES UR STRIP-MCNC: 40 MG/DL
LACTATE BLD-SCNC: 0.9 MMOL/L (ref 0.7–2)
LEUKOCYTE ESTERASE UR QL STRIP: ABNORMAL
LIPASE SERPL-CCNC: 127 U/L (ref 73–393)
LIPASE SERPL-CCNC: 153 U/L (ref 73–393)
LIPASE SERPL-CCNC: 177 U/L (ref 73–393)
LMWH PPP CHRO-ACNC: 0.18 IU/ML
LMWH PPP CHRO-ACNC: 0.3 IU/ML
LMWH PPP CHRO-ACNC: 0.37 IU/ML
LMWH PPP CHRO-ACNC: 0.47 IU/ML
LYMPHOCYTES # BLD AUTO: 1 10E9/L (ref 0.8–5.3)
LYMPHOCYTES # BLD AUTO: 1.7 10E9/L (ref 0.8–5.3)
LYMPHOCYTES # BLD AUTO: 1.8 10E9/L (ref 0.8–5.3)
LYMPHOCYTES # BLD AUTO: 1.9 10E9/L (ref 0.8–5.3)
LYMPHOCYTES NFR BLD AUTO: 13.1 %
LYMPHOCYTES NFR BLD AUTO: 13.2 %
LYMPHOCYTES NFR BLD AUTO: 14.8 %
LYMPHOCYTES NFR BLD AUTO: 22 %
Lab: NORMAL
MAGNESIUM SERPL-MCNC: 1.8 MG/DL (ref 1.6–2.3)
MAGNESIUM SERPL-MCNC: 1.9 MG/DL (ref 1.6–2.3)
MAGNESIUM SERPL-MCNC: 2.1 MG/DL (ref 1.6–2.3)
MAGNESIUM SERPL-MCNC: 2.2 MG/DL (ref 1.6–2.3)
MCH RBC QN AUTO: 30.4 PG (ref 26.5–33)
MCH RBC QN AUTO: 30.5 PG (ref 26.5–33)
MCH RBC QN AUTO: 30.6 PG (ref 26.5–33)
MCH RBC QN AUTO: 30.7 PG (ref 26.5–33)
MCH RBC QN AUTO: 30.7 PG (ref 26.5–33)
MCH RBC QN AUTO: 30.8 PG (ref 26.5–33)
MCH RBC QN AUTO: 31.2 PG (ref 26.5–33)
MCH RBC QN AUTO: 31.3 PG (ref 26.5–33)
MCHC RBC AUTO-ENTMCNC: 32.2 G/DL (ref 31.5–36.5)
MCHC RBC AUTO-ENTMCNC: 32.5 G/DL (ref 31.5–36.5)
MCHC RBC AUTO-ENTMCNC: 32.8 G/DL (ref 31.5–36.5)
MCHC RBC AUTO-ENTMCNC: 32.9 G/DL (ref 31.5–36.5)
MCHC RBC AUTO-ENTMCNC: 33.6 G/DL (ref 31.5–36.5)
MCHC RBC AUTO-ENTMCNC: 33.8 G/DL (ref 31.5–36.5)
MCHC RBC AUTO-ENTMCNC: 33.8 G/DL (ref 31.5–36.5)
MCHC RBC AUTO-ENTMCNC: 34.8 G/DL (ref 31.5–36.5)
MCV RBC AUTO: 90 FL (ref 78–100)
MCV RBC AUTO: 90 FL (ref 78–100)
MCV RBC AUTO: 91 FL (ref 78–100)
MCV RBC AUTO: 93 FL (ref 78–100)
MCV RBC AUTO: 93 FL (ref 78–100)
MCV RBC AUTO: 94 FL (ref 78–100)
MCV RBC AUTO: 94 FL (ref 78–100)
MCV RBC AUTO: 95 FL (ref 78–100)
MONOCYTES # BLD AUTO: 0.6 10E9/L (ref 0–1.3)
MONOCYTES # BLD AUTO: 0.7 10E9/L (ref 0–1.3)
MONOCYTES # BLD AUTO: 0.9 10E9/L (ref 0–1.3)
MONOCYTES # BLD AUTO: 0.9 10E9/L (ref 0–1.3)
MONOCYTES NFR BLD AUTO: 7.4 %
MONOCYTES NFR BLD AUTO: 7.7 %
MONOCYTES NFR BLD AUTO: 7.9 %
MONOCYTES NFR BLD AUTO: 8.2 %
MUCOUS THREADS #/AREA URNS LPF: PRESENT /LPF
NEUTROPHILS # BLD AUTO: 10 10E9/L (ref 1.6–8.3)
NEUTROPHILS # BLD AUTO: 5.8 10E9/L (ref 1.6–8.3)
NEUTROPHILS # BLD AUTO: 6 10E9/L (ref 1.6–8.3)
NEUTROPHILS # BLD AUTO: 9.3 10E9/L (ref 1.6–8.3)
NEUTROPHILS NFR BLD AUTO: 68 %
NEUTROPHILS NFR BLD AUTO: 72.6 %
NEUTROPHILS NFR BLD AUTO: 76.6 %
NEUTROPHILS NFR BLD AUTO: 77.9 %
NITRATE UR QL: NEGATIVE
NRBC # BLD AUTO: 0 10*3/UL
NRBC BLD AUTO-RTO: 0 /100
NT-PROBNP SERPL-MCNC: 239 PG/ML (ref 0–1800)
NT-PROBNP SERPL-MCNC: 719 PG/ML (ref 0–1800)
PH UR STRIP: 6 PH (ref 5–7)
PLATELET # BLD AUTO: 291 10E9/L (ref 150–450)
PLATELET # BLD AUTO: 303 10E9/L (ref 150–450)
PLATELET # BLD AUTO: 304 10E9/L (ref 150–450)
PLATELET # BLD AUTO: 325 10E9/L (ref 150–450)
PLATELET # BLD AUTO: 339 10E9/L (ref 150–450)
PLATELET # BLD AUTO: 343 10E9/L (ref 150–450)
PLATELET # BLD AUTO: 354 10E9/L (ref 150–450)
PLATELET # BLD AUTO: 380 10E9/L (ref 150–450)
POTASSIUM SERPL-SCNC: 3 MMOL/L (ref 3.4–5.3)
POTASSIUM SERPL-SCNC: 3 MMOL/L (ref 3.4–5.3)
POTASSIUM SERPL-SCNC: 3.2 MMOL/L (ref 3.4–5.3)
POTASSIUM SERPL-SCNC: 3.3 MMOL/L (ref 3.4–5.3)
POTASSIUM SERPL-SCNC: 3.6 MMOL/L (ref 3.4–5.3)
POTASSIUM SERPL-SCNC: 3.7 MMOL/L (ref 3.4–5.3)
POTASSIUM SERPL-SCNC: 3.8 MMOL/L (ref 3.4–5.3)
POTASSIUM SERPL-SCNC: 3.8 MMOL/L (ref 3.4–5.3)
POTASSIUM SERPL-SCNC: 3.9 MMOL/L (ref 3.4–5.3)
POTASSIUM SERPL-SCNC: 4.2 MMOL/L (ref 3.4–5.3)
PROT SERPL-MCNC: 5.7 G/DL (ref 6.8–8.8)
PROT SERPL-MCNC: 5.8 G/DL (ref 6.8–8.8)
PROT SERPL-MCNC: 6.5 G/DL (ref 6.8–8.8)
PROT SERPL-MCNC: 7.7 G/DL (ref 6.8–8.8)
PROT SERPL-MCNC: 8 G/DL (ref 6.8–8.8)
RBC # BLD AUTO: 3.84 10E12/L (ref 3.8–5.2)
RBC # BLD AUTO: 3.86 10E12/L (ref 3.8–5.2)
RBC # BLD AUTO: 3.97 10E12/L (ref 3.8–5.2)
RBC # BLD AUTO: 4.03 10E12/L (ref 3.8–5.2)
RBC # BLD AUTO: 4.21 10E12/L (ref 3.8–5.2)
RBC # BLD AUTO: 4.25 10E12/L (ref 3.8–5.2)
RBC # BLD AUTO: 4.65 10E12/L (ref 3.8–5.2)
RBC # BLD AUTO: 4.69 10E12/L (ref 3.8–5.2)
RBC #/AREA URNS AUTO: 2 /HPF (ref 0–2)
SODIUM SERPL-SCNC: 138 MMOL/L (ref 133–144)
SODIUM SERPL-SCNC: 139 MMOL/L (ref 133–144)
SODIUM SERPL-SCNC: 140 MMOL/L (ref 133–144)
SODIUM SERPL-SCNC: 140 MMOL/L (ref 133–144)
SODIUM SERPL-SCNC: 141 MMOL/L (ref 133–144)
SODIUM SERPL-SCNC: 144 MMOL/L (ref 133–144)
SODIUM SERPL-SCNC: 144 MMOL/L (ref 133–144)
SODIUM SERPL-SCNC: 146 MMOL/L (ref 133–144)
SOURCE: ABNORMAL
SP GR UR STRIP: 1.02 (ref 1–1.03)
SPECIMEN SOURCE: NORMAL
SQUAMOUS #/AREA URNS AUTO: 1 /HPF (ref 0–1)
T3FREE SERPL-MCNC: 2.1 PG/ML (ref 2.3–4.2)
T4 FREE SERPL-MCNC: 1.21 NG/DL (ref 0.76–1.46)
TROPONIN I SERPL-MCNC: 0.06 UG/L (ref 0–0.04)
TROPONIN I SERPL-MCNC: 0.13 UG/L (ref 0–0.04)
TROPONIN I SERPL-MCNC: <0.015 UG/L (ref 0–0.04)
TSH SERPL DL<=0.005 MIU/L-ACNC: 0.35 MU/L (ref 0.4–4)
UPPER GI ENDOSCOPY: NORMAL
UPPER GI ENDOSCOPY: NORMAL
UROBILINOGEN UR STRIP-MCNC: NORMAL MG/DL (ref 0–2)
WBC # BLD AUTO: 12.2 10E9/L (ref 4–11)
WBC # BLD AUTO: 12.8 10E9/L (ref 4–11)
WBC # BLD AUTO: 6.1 10E9/L (ref 4–11)
WBC # BLD AUTO: 7.8 10E9/L (ref 4–11)
WBC # BLD AUTO: 8 10E9/L (ref 4–11)
WBC # BLD AUTO: 8.8 10E9/L (ref 4–11)
WBC # BLD AUTO: 8.8 10E9/L (ref 4–11)
WBC # BLD AUTO: 8.9 10E9/L (ref 4–11)
WBC #/AREA URNS AUTO: 34 /HPF (ref 0–5)

## 2018-01-01 PROCEDURE — 83690 ASSAY OF LIPASE: CPT | Performed by: EMERGENCY MEDICINE

## 2018-01-01 PROCEDURE — 88305 TISSUE EXAM BY PATHOLOGIST: CPT | Mod: 26 | Performed by: SURGERY

## 2018-01-01 PROCEDURE — 34300033 ZZH RX 343: Performed by: INTERNAL MEDICINE

## 2018-01-01 PROCEDURE — A9270 NON-COVERED ITEM OR SERVICE: HCPCS | Mod: GY | Performed by: INTERNAL MEDICINE

## 2018-01-01 PROCEDURE — 36415 COLL VENOUS BLD VENIPUNCTURE: CPT | Performed by: INTERNAL MEDICINE

## 2018-01-01 PROCEDURE — 25000132 ZZH RX MED GY IP 250 OP 250 PS 637: Mod: GY | Performed by: INTERNAL MEDICINE

## 2018-01-01 PROCEDURE — 25000128 H RX IP 250 OP 636: Performed by: EMERGENCY MEDICINE

## 2018-01-01 PROCEDURE — 96365 THER/PROPH/DIAG IV INF INIT: CPT

## 2018-01-01 PROCEDURE — 99222 1ST HOSP IP/OBS MODERATE 55: CPT | Performed by: INTERNAL MEDICINE

## 2018-01-01 PROCEDURE — A9270 NON-COVERED ITEM OR SERVICE: HCPCS | Mod: GY | Performed by: PHYSICIAN ASSISTANT

## 2018-01-01 PROCEDURE — 84481 FREE ASSAY (FT-3): CPT | Performed by: INTERNAL MEDICINE

## 2018-01-01 PROCEDURE — 83735 ASSAY OF MAGNESIUM: CPT | Performed by: INTERNAL MEDICINE

## 2018-01-01 PROCEDURE — 74177 CT ABD & PELVIS W/CONTRAST: CPT

## 2018-01-01 PROCEDURE — 0DHA8UZ INSERTION OF FEEDING DEVICE INTO JEJUNUM, VIA NATURAL OR ARTIFICIAL OPENING ENDOSCOPIC: ICD-10-PCS | Performed by: INTERNAL MEDICINE

## 2018-01-01 PROCEDURE — 85520 HEPARIN ASSAY: CPT | Performed by: HOSPITALIST

## 2018-01-01 PROCEDURE — 25000132 ZZH RX MED GY IP 250 OP 250 PS 637: Mod: GY | Performed by: STUDENT IN AN ORGANIZED HEALTH CARE EDUCATION/TRAINING PROGRAM

## 2018-01-01 PROCEDURE — 12000007 ZZH R&B INTERMEDIATE

## 2018-01-01 PROCEDURE — 25000128 H RX IP 250 OP 636

## 2018-01-01 PROCEDURE — 25000132 ZZH RX MED GY IP 250 OP 250 PS 637: Mod: GY | Performed by: EMERGENCY MEDICINE

## 2018-01-01 PROCEDURE — 25000128 H RX IP 250 OP 636: Performed by: INTERNAL MEDICINE

## 2018-01-01 PROCEDURE — 85025 COMPLETE CBC W/AUTO DIFF WBC: CPT | Performed by: EMERGENCY MEDICINE

## 2018-01-01 PROCEDURE — 25000131 ZZH RX MED GY IP 250 OP 636 PS 637: Mod: GY | Performed by: INTERNAL MEDICINE

## 2018-01-01 PROCEDURE — 97535 SELF CARE MNGMENT TRAINING: CPT | Mod: GO | Performed by: OCCUPATIONAL THERAPY ASSISTANT

## 2018-01-01 PROCEDURE — 96375 TX/PRO/DX INJ NEW DRUG ADDON: CPT

## 2018-01-01 PROCEDURE — 78264 GASTRIC EMPTYING IMG STUDY: CPT

## 2018-01-01 PROCEDURE — 93010 ELECTROCARDIOGRAM REPORT: CPT | Performed by: INTERNAL MEDICINE

## 2018-01-01 PROCEDURE — 99285 EMERGENCY DEPT VISIT HI MDM: CPT | Mod: 25

## 2018-01-01 PROCEDURE — 80048 BASIC METABOLIC PNL TOTAL CA: CPT | Performed by: INTERNAL MEDICINE

## 2018-01-01 PROCEDURE — 25000132 ZZH RX MED GY IP 250 OP 250 PS 637: Mod: GY | Performed by: PHYSICIAN ASSISTANT

## 2018-01-01 PROCEDURE — 93306 TTE W/DOPPLER COMPLETE: CPT | Mod: 26 | Performed by: INTERNAL MEDICINE

## 2018-01-01 PROCEDURE — A9270 NON-COVERED ITEM OR SERVICE: HCPCS | Mod: GY | Performed by: HOSPITALIST

## 2018-01-01 PROCEDURE — 85027 COMPLETE CBC AUTOMATED: CPT | Performed by: INTERNAL MEDICINE

## 2018-01-01 PROCEDURE — 25000125 ZZHC RX 250: Performed by: PHYSICIAN ASSISTANT

## 2018-01-01 PROCEDURE — 25000125 ZZHC RX 250: Performed by: INTERNAL MEDICINE

## 2018-01-01 PROCEDURE — 84132 ASSAY OF SERUM POTASSIUM: CPT | Performed by: INTERNAL MEDICINE

## 2018-01-01 PROCEDURE — 40000133 ZZH STATISTIC OT WARD VISIT: Performed by: OCCUPATIONAL THERAPY ASSISTANT

## 2018-01-01 PROCEDURE — G0463 HOSPITAL OUTPT CLINIC VISIT: HCPCS

## 2018-01-01 PROCEDURE — 97530 THERAPEUTIC ACTIVITIES: CPT | Mod: GP

## 2018-01-01 PROCEDURE — 40000193 ZZH STATISTIC PT WARD VISIT

## 2018-01-01 PROCEDURE — 74019 RADEX ABDOMEN 2 VIEWS: CPT

## 2018-01-01 PROCEDURE — A9270 NON-COVERED ITEM OR SERVICE: HCPCS | Mod: GY | Performed by: STUDENT IN AN ORGANIZED HEALTH CARE EDUCATION/TRAINING PROGRAM

## 2018-01-01 PROCEDURE — 96368 THER/DIAG CONCURRENT INF: CPT

## 2018-01-01 PROCEDURE — 97165 OT EVAL LOW COMPLEX 30 MIN: CPT | Mod: GO

## 2018-01-01 PROCEDURE — 40000193 ZZH STATISTIC PT WARD VISIT: Performed by: PHYSICAL THERAPIST

## 2018-01-01 PROCEDURE — 96376 TX/PRO/DX INJ SAME DRUG ADON: CPT

## 2018-01-01 PROCEDURE — 85730 THROMBOPLASTIN TIME PARTIAL: CPT | Performed by: EMERGENCY MEDICINE

## 2018-01-01 PROCEDURE — 99207 ZZC CDG-MDM COMPONENT: MEETS LOW - DOWN CODED: CPT | Performed by: INTERNAL MEDICINE

## 2018-01-01 PROCEDURE — 99232 SBSQ HOSP IP/OBS MODERATE 35: CPT | Performed by: HOSPITALIST

## 2018-01-01 PROCEDURE — 99233 SBSQ HOSP IP/OBS HIGH 50: CPT | Performed by: INTERNAL MEDICINE

## 2018-01-01 PROCEDURE — 80053 COMPREHEN METABOLIC PANEL: CPT | Performed by: EMERGENCY MEDICINE

## 2018-01-01 PROCEDURE — 80053 COMPREHEN METABOLIC PANEL: CPT | Performed by: INTERNAL MEDICINE

## 2018-01-01 PROCEDURE — 99239 HOSP IP/OBS DSCHRG MGMT >30: CPT | Performed by: INTERNAL MEDICINE

## 2018-01-01 PROCEDURE — 25000128 H RX IP 250 OP 636: Performed by: SURGERY

## 2018-01-01 PROCEDURE — 96361 HYDRATE IV INFUSION ADD-ON: CPT

## 2018-01-01 PROCEDURE — 97530 THERAPEUTIC ACTIVITIES: CPT | Mod: GO | Performed by: OCCUPATIONAL THERAPY ASSISTANT

## 2018-01-01 PROCEDURE — 96366 THER/PROPH/DIAG IV INF ADDON: CPT

## 2018-01-01 PROCEDURE — 97116 GAIT TRAINING THERAPY: CPT | Mod: GP | Performed by: PHYSICAL THERAPIST

## 2018-01-01 PROCEDURE — 84132 ASSAY OF SERUM POTASSIUM: CPT | Performed by: HOSPITALIST

## 2018-01-01 PROCEDURE — C9113 INJ PANTOPRAZOLE SODIUM, VIA: HCPCS | Performed by: INTERNAL MEDICINE

## 2018-01-01 PROCEDURE — 25000132 ZZH RX MED GY IP 250 OP 250 PS 637: Mod: GY | Performed by: HOSPITALIST

## 2018-01-01 PROCEDURE — 93005 ELECTROCARDIOGRAM TRACING: CPT

## 2018-01-01 PROCEDURE — 99226 ZZC SUBSEQUENT OBSERVATION CARE,LEVEL III: CPT | Performed by: HOSPITALIST

## 2018-01-01 PROCEDURE — C9113 INJ PANTOPRAZOLE SODIUM, VIA: HCPCS | Performed by: HOSPITALIST

## 2018-01-01 PROCEDURE — 43245 EGD DILATE STRICTURE: CPT | Performed by: INTERNAL MEDICINE

## 2018-01-01 PROCEDURE — 40000275 ZZH STATISTIC RCP TIME EA 10 MIN

## 2018-01-01 PROCEDURE — 43239 EGD BIOPSY SINGLE/MULTIPLE: CPT | Performed by: SURGERY

## 2018-01-01 PROCEDURE — 97530 THERAPEUTIC ACTIVITIES: CPT | Mod: GO

## 2018-01-01 PROCEDURE — 40000133 ZZH STATISTIC OT WARD VISIT

## 2018-01-01 PROCEDURE — G0500 MOD SEDAT ENDO SERVICE >5YRS: HCPCS | Performed by: INTERNAL MEDICINE

## 2018-01-01 PROCEDURE — C9113 INJ PANTOPRAZOLE SODIUM, VIA: HCPCS | Performed by: PHYSICIAN ASSISTANT

## 2018-01-01 PROCEDURE — 85520 HEPARIN ASSAY: CPT | Performed by: EMERGENCY MEDICINE

## 2018-01-01 PROCEDURE — C9113 INJ PANTOPRAZOLE SODIUM, VIA: HCPCS | Performed by: EMERGENCY MEDICINE

## 2018-01-01 PROCEDURE — 88305 TISSUE EXAM BY PATHOLOGIST: CPT | Performed by: SURGERY

## 2018-01-01 PROCEDURE — 85520 HEPARIN ASSAY: CPT | Performed by: INTERNAL MEDICINE

## 2018-01-01 PROCEDURE — 99232 SBSQ HOSP IP/OBS MODERATE 35: CPT | Performed by: INTERNAL MEDICINE

## 2018-01-01 PROCEDURE — 85025 COMPLETE CBC W/AUTO DIFF WBC: CPT | Performed by: INTERNAL MEDICINE

## 2018-01-01 PROCEDURE — 99152 MOD SED SAME PHYS/QHP 5/>YRS: CPT | Performed by: INTERNAL MEDICINE

## 2018-01-01 PROCEDURE — 25000128 H RX IP 250 OP 636: Performed by: PHYSICIAN ASSISTANT

## 2018-01-01 PROCEDURE — 85379 FIBRIN DEGRADATION QUANT: CPT | Performed by: EMERGENCY MEDICINE

## 2018-01-01 PROCEDURE — 25000128 H RX IP 250 OP 636: Performed by: HOSPITALIST

## 2018-01-01 PROCEDURE — 99223 1ST HOSP IP/OBS HIGH 75: CPT | Mod: AI | Performed by: INTERNAL MEDICINE

## 2018-01-01 PROCEDURE — 80048 BASIC METABOLIC PNL TOTAL CA: CPT | Performed by: EMERGENCY MEDICINE

## 2018-01-01 PROCEDURE — 83605 ASSAY OF LACTIC ACID: CPT | Performed by: HOSPITALIST

## 2018-01-01 PROCEDURE — 84484 ASSAY OF TROPONIN QUANT: CPT | Performed by: INTERNAL MEDICINE

## 2018-01-01 PROCEDURE — C1876 STENT, NON-COA/NON-COV W/DEL: HCPCS | Performed by: INTERNAL MEDICINE

## 2018-01-01 PROCEDURE — 84439 ASSAY OF FREE THYROXINE: CPT | Performed by: INTERNAL MEDICINE

## 2018-01-01 PROCEDURE — 85610 PROTHROMBIN TIME: CPT | Performed by: EMERGENCY MEDICINE

## 2018-01-01 PROCEDURE — G0378 HOSPITAL OBSERVATION PER HR: HCPCS

## 2018-01-01 PROCEDURE — 71045 X-RAY EXAM CHEST 1 VIEW: CPT

## 2018-01-01 PROCEDURE — 97530 THERAPEUTIC ACTIVITIES: CPT | Mod: GP | Performed by: PHYSICAL THERAPIST

## 2018-01-01 PROCEDURE — 99205 OFFICE O/P NEW HI 60 MIN: CPT | Performed by: OBSTETRICS & GYNECOLOGY

## 2018-01-01 PROCEDURE — 25000125 ZZHC RX 250: Performed by: EMERGENCY MEDICINE

## 2018-01-01 PROCEDURE — 93306 TTE W/DOPPLER COMPLETE: CPT

## 2018-01-01 PROCEDURE — 96374 THER/PROPH/DIAG INJ IV PUSH: CPT

## 2018-01-01 PROCEDURE — 97116 GAIT TRAINING THERAPY: CPT | Mod: GP

## 2018-01-01 PROCEDURE — 80076 HEPATIC FUNCTION PANEL: CPT | Performed by: EMERGENCY MEDICINE

## 2018-01-01 PROCEDURE — A9270 NON-COVERED ITEM OR SERVICE: HCPCS | Mod: GY | Performed by: EMERGENCY MEDICINE

## 2018-01-01 PROCEDURE — 84484 ASSAY OF TROPONIN QUANT: CPT | Performed by: EMERGENCY MEDICINE

## 2018-01-01 PROCEDURE — 96374 THER/PROPH/DIAG INJ IV PUSH: CPT | Mod: 59

## 2018-01-01 PROCEDURE — 71260 CT THORAX DX C+: CPT

## 2018-01-01 PROCEDURE — 99284 EMERGENCY DEPT VISIT MOD MDM: CPT | Mod: 25

## 2018-01-01 PROCEDURE — 93005 ELECTROCARDIOGRAM TRACING: CPT | Mod: 76

## 2018-01-01 PROCEDURE — 83880 ASSAY OF NATRIURETIC PEPTIDE: CPT | Performed by: EMERGENCY MEDICINE

## 2018-01-01 PROCEDURE — 36415 COLL VENOUS BLD VENIPUNCTURE: CPT | Performed by: HOSPITALIST

## 2018-01-01 PROCEDURE — 86304 IMMUNOASSAY TUMOR CA 125: CPT | Performed by: INTERNAL MEDICINE

## 2018-01-01 PROCEDURE — 0D778DZ DILATION OF STOMACH, PYLORUS WITH INTRALUMINAL DEVICE, VIA NATURAL OR ARTIFICIAL OPENING ENDOSCOPIC: ICD-10-PCS | Performed by: INTERNAL MEDICINE

## 2018-01-01 PROCEDURE — 97161 PT EVAL LOW COMPLEX 20 MIN: CPT | Mod: GP | Performed by: PHYSICAL THERAPIST

## 2018-01-01 PROCEDURE — 81001 URINALYSIS AUTO W/SCOPE: CPT | Performed by: EMERGENCY MEDICINE

## 2018-01-01 PROCEDURE — 87086 URINE CULTURE/COLONY COUNT: CPT | Performed by: INTERNAL MEDICINE

## 2018-01-01 PROCEDURE — 83880 ASSAY OF NATRIURETIC PEPTIDE: CPT | Performed by: INTERNAL MEDICINE

## 2018-01-01 PROCEDURE — 71046 X-RAY EXAM CHEST 2 VIEWS: CPT

## 2018-01-01 PROCEDURE — 44500 INTRO GASTROINTESTINAL TUBE: CPT

## 2018-01-01 PROCEDURE — 99220 ZZC INITIAL OBSERVATION CARE,LEVL III: CPT | Performed by: HOSPITALIST

## 2018-01-01 PROCEDURE — G0500 MOD SEDAT ENDO SERVICE >5YRS: HCPCS | Performed by: SURGERY

## 2018-01-01 PROCEDURE — A9541 TC99M SULFUR COLLOID: HCPCS | Performed by: INTERNAL MEDICINE

## 2018-01-01 PROCEDURE — 99217 ZZC OBSERVATION CARE DISCHARGE: CPT | Performed by: HOSPITALIST

## 2018-01-01 PROCEDURE — 36415 COLL VENOUS BLD VENIPUNCTURE: CPT | Performed by: EMERGENCY MEDICINE

## 2018-01-01 PROCEDURE — 84443 ASSAY THYROID STIM HORMONE: CPT | Performed by: INTERNAL MEDICINE

## 2018-01-01 PROCEDURE — 40000556 ZZH STATISTIC PERIPHERAL IV START W US GUIDANCE

## 2018-01-01 RX ORDER — NALOXONE HYDROCHLORIDE 0.4 MG/ML
.1-.4 INJECTION, SOLUTION INTRAMUSCULAR; INTRAVENOUS; SUBCUTANEOUS
Status: ACTIVE | OUTPATIENT
Start: 2018-01-01 | End: 2018-01-01

## 2018-01-01 RX ORDER — LIDOCAINE 40 MG/G
CREAM TOPICAL
Status: DISCONTINUED | OUTPATIENT
Start: 2018-01-01 | End: 2018-01-01 | Stop reason: HOSPADM

## 2018-01-01 RX ORDER — POTASSIUM CHLORIDE 1500 MG/1
20-40 TABLET, EXTENDED RELEASE ORAL
Status: DISCONTINUED | OUTPATIENT
Start: 2018-01-01 | End: 2018-01-01

## 2018-01-01 RX ORDER — METOCLOPRAMIDE HYDROCHLORIDE 5 MG/ML
5 INJECTION INTRAMUSCULAR; INTRAVENOUS EVERY 6 HOURS PRN
Status: DISCONTINUED | OUTPATIENT
Start: 2018-01-01 | End: 2018-01-01

## 2018-01-01 RX ORDER — MAGNESIUM SULFATE HEPTAHYDRATE 40 MG/ML
2 INJECTION, SOLUTION INTRAVENOUS DAILY PRN
Status: DISCONTINUED | OUTPATIENT
Start: 2018-01-01 | End: 2018-01-01 | Stop reason: HOSPADM

## 2018-01-01 RX ORDER — POTASSIUM CHLORIDE 1.5 G/1.58G
20-40 POWDER, FOR SOLUTION ORAL
Status: DISCONTINUED | OUTPATIENT
Start: 2018-01-01 | End: 2018-01-01 | Stop reason: HOSPADM

## 2018-01-01 RX ORDER — ONDANSETRON 2 MG/ML
4 INJECTION INTRAMUSCULAR; INTRAVENOUS ONCE
Status: COMPLETED | OUTPATIENT
Start: 2018-01-01 | End: 2018-01-01

## 2018-01-01 RX ORDER — POTASSIUM CHLORIDE 1500 MG/1
20-40 TABLET, EXTENDED RELEASE ORAL
Status: DISCONTINUED | OUTPATIENT
Start: 2018-01-01 | End: 2018-01-01 | Stop reason: HOSPADM

## 2018-01-01 RX ORDER — NALOXONE HYDROCHLORIDE 0.4 MG/ML
.1-.4 INJECTION, SOLUTION INTRAMUSCULAR; INTRAVENOUS; SUBCUTANEOUS
Status: DISCONTINUED | OUTPATIENT
Start: 2018-01-01 | End: 2018-01-01

## 2018-01-01 RX ORDER — PROCHLORPERAZINE MALEATE 5 MG
5 TABLET ORAL EVERY 6 HOURS PRN
Status: DISCONTINUED | OUTPATIENT
Start: 2018-01-01 | End: 2018-01-01 | Stop reason: HOSPADM

## 2018-01-01 RX ORDER — ONDANSETRON 4 MG/1
8 TABLET, ORALLY DISINTEGRATING ORAL
Status: DISCONTINUED | OUTPATIENT
Start: 2018-01-01 | End: 2018-01-01 | Stop reason: HOSPADM

## 2018-01-01 RX ORDER — PANTOPRAZOLE SODIUM 20 MG/1
TABLET, DELAYED RELEASE ORAL
Qty: 30 TABLET | Refills: 1 | Status: ON HOLD | OUTPATIENT
Start: 2018-01-01 | End: 2018-01-01

## 2018-01-01 RX ORDER — SODIUM CHLORIDE 9 MG/ML
INJECTION, SOLUTION INTRAVENOUS CONTINUOUS
Status: DISCONTINUED | OUTPATIENT
Start: 2018-01-01 | End: 2018-01-01 | Stop reason: HOSPADM

## 2018-01-01 RX ORDER — MAGNESIUM SULFATE HEPTAHYDRATE 40 MG/ML
4 INJECTION, SOLUTION INTRAVENOUS EVERY 4 HOURS PRN
Status: DISCONTINUED | OUTPATIENT
Start: 2018-01-01 | End: 2018-01-01 | Stop reason: HOSPADM

## 2018-01-01 RX ORDER — POTASSIUM CL/LIDO/0.9 % NACL 10MEQ/0.1L
10 INTRAVENOUS SOLUTION, PIGGYBACK (ML) INTRAVENOUS
Status: DISCONTINUED | OUTPATIENT
Start: 2018-01-01 | End: 2018-01-01

## 2018-01-01 RX ORDER — FUROSEMIDE 20 MG
20 TABLET ORAL ONCE
Status: COMPLETED | OUTPATIENT
Start: 2018-01-01 | End: 2018-01-01

## 2018-01-01 RX ORDER — POTASSIUM CHLORIDE 1.5 G/1.58G
20-40 POWDER, FOR SOLUTION ORAL
Status: DISCONTINUED | OUTPATIENT
Start: 2018-01-01 | End: 2018-01-01

## 2018-01-01 RX ORDER — ACETAMINOPHEN 325 MG/1
325-650 TABLET ORAL EVERY 6 HOURS PRN
Status: DISCONTINUED | OUTPATIENT
Start: 2018-01-01 | End: 2018-01-01 | Stop reason: HOSPADM

## 2018-01-01 RX ORDER — METOCLOPRAMIDE 5 MG/1
5 TABLET ORAL EVERY 6 HOURS PRN
Status: DISCONTINUED | OUTPATIENT
Start: 2018-01-01 | End: 2018-01-01

## 2018-01-01 RX ORDER — FUROSEMIDE 10 MG/ML
20 INJECTION INTRAMUSCULAR; INTRAVENOUS ONCE
Status: COMPLETED | OUTPATIENT
Start: 2018-01-01 | End: 2018-01-01

## 2018-01-01 RX ORDER — ONDANSETRON 2 MG/ML
4 INJECTION INTRAMUSCULAR; INTRAVENOUS EVERY 6 HOURS PRN
Status: DISCONTINUED | OUTPATIENT
Start: 2018-01-01 | End: 2018-01-01 | Stop reason: HOSPADM

## 2018-01-01 RX ORDER — POTASSIUM CL/LIDO/0.9 % NACL 10MEQ/0.1L
10 INTRAVENOUS SOLUTION, PIGGYBACK (ML) INTRAVENOUS
Status: COMPLETED | OUTPATIENT
Start: 2018-01-01 | End: 2018-01-01

## 2018-01-01 RX ORDER — PROCHLORPERAZINE 25 MG
12.5 SUPPOSITORY, RECTAL RECTAL EVERY 12 HOURS PRN
Status: DISCONTINUED | OUTPATIENT
Start: 2018-01-01 | End: 2018-01-01 | Stop reason: HOSPADM

## 2018-01-01 RX ORDER — PANTOPRAZOLE SODIUM 40 MG/1
40 TABLET, DELAYED RELEASE ORAL
Status: DISCONTINUED | OUTPATIENT
Start: 2018-01-01 | End: 2018-01-01

## 2018-01-01 RX ORDER — ACETAMINOPHEN 325 MG/1
650 TABLET ORAL EVERY 4 HOURS PRN
Status: DISCONTINUED | OUTPATIENT
Start: 2018-01-01 | End: 2018-01-01 | Stop reason: HOSPADM

## 2018-01-01 RX ORDER — METOPROLOL TARTRATE 1 MG/ML
5 INJECTION, SOLUTION INTRAVENOUS EVERY 6 HOURS
Status: DISCONTINUED | OUTPATIENT
Start: 2018-01-01 | End: 2018-01-01

## 2018-01-01 RX ORDER — ONDANSETRON 4 MG/1
4 TABLET, ORALLY DISINTEGRATING ORAL EVERY 6 HOURS PRN
Status: DISCONTINUED | OUTPATIENT
Start: 2018-01-01 | End: 2018-01-01 | Stop reason: HOSPADM

## 2018-01-01 RX ORDER — CALCIUM CARBONATE 500 MG/1
500 TABLET, CHEWABLE ORAL DAILY PRN
Status: DISCONTINUED | OUTPATIENT
Start: 2018-01-01 | End: 2018-01-01 | Stop reason: HOSPADM

## 2018-01-01 RX ORDER — POTASSIUM CHLORIDE 29.8 MG/ML
20 INJECTION INTRAVENOUS
Status: DISCONTINUED | OUTPATIENT
Start: 2018-01-01 | End: 2018-01-01

## 2018-01-01 RX ORDER — NALOXONE HYDROCHLORIDE 0.4 MG/ML
.1-.4 INJECTION, SOLUTION INTRAMUSCULAR; INTRAVENOUS; SUBCUTANEOUS
Status: DISCONTINUED | OUTPATIENT
Start: 2018-01-01 | End: 2018-01-01 | Stop reason: HOSPADM

## 2018-01-01 RX ORDER — PROCHLORPERAZINE MALEATE 5 MG
5 TABLET ORAL EVERY 6 HOURS PRN
Status: DISCONTINUED | OUTPATIENT
Start: 2018-01-01 | End: 2018-01-01

## 2018-01-01 RX ORDER — METOPROLOL SUCCINATE 25 MG/1
25 TABLET, EXTENDED RELEASE ORAL DAILY
Status: DISCONTINUED | OUTPATIENT
Start: 2018-01-01 | End: 2018-01-01 | Stop reason: HOSPADM

## 2018-01-01 RX ORDER — SODIUM CHLORIDE 9 MG/ML
INJECTION, SOLUTION INTRAVENOUS CONTINUOUS
Status: DISCONTINUED | OUTPATIENT
Start: 2018-01-01 | End: 2018-01-01

## 2018-01-01 RX ORDER — LOSARTAN POTASSIUM AND HYDROCHLOROTHIAZIDE 12.5; 1 MG/1; MG/1
1 TABLET ORAL EVERY MORNING
Status: DISCONTINUED | OUTPATIENT
Start: 2018-01-01 | End: 2018-01-01 | Stop reason: HOSPADM

## 2018-01-01 RX ORDER — PROCHLORPERAZINE 25 MG
12.5 SUPPOSITORY, RECTAL RECTAL EVERY 12 HOURS PRN
Status: DISCONTINUED | OUTPATIENT
Start: 2018-01-01 | End: 2018-01-01

## 2018-01-01 RX ORDER — LOSARTAN POTASSIUM 100 MG/1
100 TABLET ORAL DAILY
Status: DISCONTINUED | OUTPATIENT
Start: 2018-01-01 | End: 2018-01-01 | Stop reason: HOSPADM

## 2018-01-01 RX ORDER — ONDANSETRON 2 MG/ML
4 INJECTION INTRAMUSCULAR; INTRAVENOUS
Status: DISCONTINUED | OUTPATIENT
Start: 2018-01-01 | End: 2018-01-01 | Stop reason: HOSPADM

## 2018-01-01 RX ORDER — FENTANYL CITRATE 50 UG/ML
INJECTION, SOLUTION INTRAMUSCULAR; INTRAVENOUS PRN
Status: DISCONTINUED | OUTPATIENT
Start: 2018-01-01 | End: 2018-01-01 | Stop reason: HOSPADM

## 2018-01-01 RX ORDER — SUCRALFATE ORAL 1 G/10ML
1 SUSPENSION ORAL
Qty: 1200 ML | Refills: 0 | Status: ON HOLD | OUTPATIENT
Start: 2018-01-01 | End: 2018-01-01

## 2018-01-01 RX ORDER — MAGNESIUM SULFATE HEPTAHYDRATE 40 MG/ML
4 INJECTION, SOLUTION INTRAVENOUS EVERY 4 HOURS PRN
Status: DISCONTINUED | OUTPATIENT
Start: 2018-01-01 | End: 2018-01-01

## 2018-01-01 RX ORDER — METOCLOPRAMIDE 5 MG/1
10 TABLET ORAL
Status: DISCONTINUED | OUTPATIENT
Start: 2018-01-01 | End: 2018-01-01

## 2018-01-01 RX ORDER — AZITHROMYCIN 250 MG/1
250 TABLET, FILM COATED ORAL DAILY
Status: DISCONTINUED | OUTPATIENT
Start: 2018-01-01 | End: 2018-01-01 | Stop reason: HOSPADM

## 2018-01-01 RX ORDER — PANTOPRAZOLE SODIUM 40 MG/1
40 TABLET, DELAYED RELEASE ORAL
Status: DISCONTINUED | OUTPATIENT
Start: 2018-01-01 | End: 2018-01-01 | Stop reason: HOSPADM

## 2018-01-01 RX ORDER — METOPROLOL SUCCINATE 25 MG/1
25 TABLET, EXTENDED RELEASE ORAL 2 TIMES DAILY
Status: DISCONTINUED | OUTPATIENT
Start: 2018-01-01 | End: 2018-01-01

## 2018-01-01 RX ORDER — IOPAMIDOL 755 MG/ML
81 INJECTION, SOLUTION INTRAVASCULAR ONCE
Status: COMPLETED | OUTPATIENT
Start: 2018-01-01 | End: 2018-01-01

## 2018-01-01 RX ORDER — PANTOPRAZOLE SODIUM 40 MG/1
40 TABLET, DELAYED RELEASE ORAL
Qty: 30 TABLET | Refills: 0 | Status: SHIPPED | OUTPATIENT
Start: 2018-01-01

## 2018-01-01 RX ORDER — METOCLOPRAMIDE 10 MG/1
10 TABLET ORAL
Qty: 90 TABLET | Refills: 0 | Status: SHIPPED | OUTPATIENT
Start: 2018-01-01

## 2018-01-01 RX ORDER — POTASSIUM CHLORIDE 20MEQ/15ML
20 LIQUID (ML) ORAL ONCE
Status: DISCONTINUED | OUTPATIENT
Start: 2018-01-01 | End: 2018-01-01

## 2018-01-01 RX ORDER — SODIUM CHLORIDE 9 MG/ML
1000 INJECTION, SOLUTION INTRAVENOUS CONTINUOUS
Status: DISCONTINUED | OUTPATIENT
Start: 2018-01-01 | End: 2018-01-01 | Stop reason: HOSPADM

## 2018-01-01 RX ORDER — IOPAMIDOL 755 MG/ML
76 INJECTION, SOLUTION INTRAVASCULAR ONCE
Status: COMPLETED | OUTPATIENT
Start: 2018-01-01 | End: 2018-01-01

## 2018-01-01 RX ORDER — POTASSIUM CHLORIDE 7.45 MG/ML
10 INJECTION INTRAVENOUS
Status: DISCONTINUED | OUTPATIENT
Start: 2018-01-01 | End: 2018-01-01

## 2018-01-01 RX ORDER — ONDANSETRON 4 MG/1
4 TABLET, ORALLY DISINTEGRATING ORAL EVERY 6 HOURS PRN
Status: DISCONTINUED | OUTPATIENT
Start: 2018-01-01 | End: 2018-01-01

## 2018-01-01 RX ORDER — AZITHROMYCIN 200 MG/5ML
200 POWDER, FOR SUSPENSION ORAL DAILY
Status: DISCONTINUED | OUTPATIENT
Start: 2018-01-01 | End: 2018-01-01

## 2018-01-01 RX ORDER — SUCRALFATE ORAL 1 G/10ML
1 SUSPENSION ORAL
Status: DISCONTINUED | OUTPATIENT
Start: 2018-01-01 | End: 2018-01-01

## 2018-01-01 RX ORDER — SUCRALFATE ORAL 1 G/10ML
1 SUSPENSION ORAL
Status: DISCONTINUED | OUTPATIENT
Start: 2018-01-01 | End: 2018-01-01 | Stop reason: HOSPADM

## 2018-01-01 RX ORDER — POTASSIUM CHLORIDE 7.45 MG/ML
10 INJECTION INTRAVENOUS
Status: DISCONTINUED | OUTPATIENT
Start: 2018-01-01 | End: 2018-01-01 | Stop reason: HOSPADM

## 2018-01-01 RX ORDER — METOCLOPRAMIDE 5 MG/1
10 TABLET ORAL
Status: DISCONTINUED | OUTPATIENT
Start: 2018-01-01 | End: 2018-01-01 | Stop reason: HOSPADM

## 2018-01-01 RX ORDER — ONDANSETRON 4 MG/1
4 TABLET, ORALLY DISINTEGRATING ORAL EVERY 8 HOURS PRN
Qty: 10 TABLET | Refills: 0 | Status: SHIPPED | OUTPATIENT
Start: 2018-01-01 | End: 2018-01-01

## 2018-01-01 RX ORDER — LOSARTAN POTASSIUM AND HYDROCHLOROTHIAZIDE 12.5; 1 MG/1; MG/1
1 TABLET ORAL EVERY MORNING
Status: DISCONTINUED | OUTPATIENT
Start: 2018-01-01 | End: 2018-01-01

## 2018-01-01 RX ORDER — HYOSCYAMINE SULFATE 0.125 MG
250 TABLET ORAL ONCE
Status: COMPLETED | OUTPATIENT
Start: 2018-01-01 | End: 2018-01-01

## 2018-01-01 RX ORDER — POLYETHYLENE GLYCOL 3350 17 G/17G
17 POWDER, FOR SOLUTION ORAL DAILY PRN
Status: DISCONTINUED | OUTPATIENT
Start: 2018-01-01 | End: 2018-01-01 | Stop reason: HOSPADM

## 2018-01-01 RX ORDER — DEXTROSE, SODIUM CHLORIDE, AND POTASSIUM CHLORIDE 5; .2; .15 G/100ML; G/100ML; G/100ML
INJECTION INTRAVENOUS CONTINUOUS
Status: DISCONTINUED | OUTPATIENT
Start: 2018-01-01 | End: 2018-01-01

## 2018-01-01 RX ORDER — ONDANSETRON 2 MG/ML
4 INJECTION INTRAMUSCULAR; INTRAVENOUS EVERY 6 HOURS PRN
Status: DISCONTINUED | OUTPATIENT
Start: 2018-01-01 | End: 2018-01-01

## 2018-01-01 RX ORDER — FLUMAZENIL 0.1 MG/ML
0.2 INJECTION, SOLUTION INTRAVENOUS
Status: ACTIVE | OUTPATIENT
Start: 2018-01-01 | End: 2018-01-01

## 2018-01-01 RX ORDER — SUCRALFATE 1 G/1
1 TABLET ORAL ONCE
Status: COMPLETED | OUTPATIENT
Start: 2018-01-01 | End: 2018-01-01

## 2018-01-01 RX ORDER — ASPIRIN 81 MG/1
81 TABLET ORAL ONCE
Status: COMPLETED | OUTPATIENT
Start: 2018-01-01 | End: 2018-01-01

## 2018-01-01 RX ORDER — FENTANYL CITRATE 50 UG/ML
100 INJECTION, SOLUTION INTRAMUSCULAR; INTRAVENOUS ONCE
Status: COMPLETED | OUTPATIENT
Start: 2018-01-01 | End: 2018-01-01

## 2018-01-01 RX ORDER — ONDANSETRON 2 MG/ML
4 INJECTION INTRAMUSCULAR; INTRAVENOUS EVERY 30 MIN PRN
Status: DISCONTINUED | OUTPATIENT
Start: 2018-01-01 | End: 2018-01-01 | Stop reason: HOSPADM

## 2018-01-01 RX ORDER — POTASSIUM CL/LIDO/0.9 % NACL 10MEQ/0.1L
10 INTRAVENOUS SOLUTION, PIGGYBACK (ML) INTRAVENOUS
Status: DISCONTINUED | OUTPATIENT
Start: 2018-01-01 | End: 2018-01-01 | Stop reason: HOSPADM

## 2018-01-01 RX ORDER — LOSARTAN POTASSIUM AND HYDROCHLOROTHIAZIDE 12.5; 1 MG/1; MG/1
1 TABLET ORAL EVERY MORNING
COMMUNITY

## 2018-01-01 RX ORDER — LOSARTAN POTASSIUM 100 MG/1
100 TABLET ORAL DAILY
Status: DISCONTINUED | OUTPATIENT
Start: 2018-01-01 | End: 2018-01-01

## 2018-01-01 RX ORDER — POTASSIUM CHLORIDE 29.8 MG/ML
20 INJECTION INTRAVENOUS
Status: DISCONTINUED | OUTPATIENT
Start: 2018-01-01 | End: 2018-01-01 | Stop reason: HOSPADM

## 2018-01-01 RX ORDER — AZITHROMYCIN 250 MG/1
250 TABLET, FILM COATED ORAL DAILY
Status: DISCONTINUED | OUTPATIENT
Start: 2018-01-01 | End: 2018-01-01

## 2018-01-01 RX ORDER — METOPROLOL SUCCINATE 25 MG/1
25 TABLET, EXTENDED RELEASE ORAL AT BEDTIME
Status: DISCONTINUED | OUTPATIENT
Start: 2018-01-01 | End: 2018-01-01 | Stop reason: HOSPADM

## 2018-01-01 RX ADMIN — METOCLOPRAMIDE 10 MG: 5 INJECTION, SOLUTION INTRAMUSCULAR; INTRAVENOUS at 11:00

## 2018-01-01 RX ADMIN — Medication 10 MEQ: at 18:55

## 2018-01-01 RX ADMIN — METOPROLOL TARTRATE 5 MG: 5 INJECTION, SOLUTION INTRAVENOUS at 14:13

## 2018-01-01 RX ADMIN — ONDANSETRON 4 MG: 2 INJECTION INTRAMUSCULAR; INTRAVENOUS at 23:59

## 2018-01-01 RX ADMIN — FUROSEMIDE 20 MG: 10 INJECTION, SOLUTION INTRAVENOUS at 08:19

## 2018-01-01 RX ADMIN — HEPARIN SODIUM 750 UNITS/HR: 10000 INJECTION, SOLUTION INTRAVENOUS at 19:20

## 2018-01-01 RX ADMIN — SODIUM CHLORIDE: 9 INJECTION, SOLUTION INTRAVENOUS at 19:53

## 2018-01-01 RX ADMIN — SODIUM CHLORIDE: 9 INJECTION, SOLUTION INTRAVENOUS at 03:54

## 2018-01-01 RX ADMIN — SODIUM CHLORIDE: 9 INJECTION, SOLUTION INTRAVENOUS at 16:59

## 2018-01-01 RX ADMIN — ONDANSETRON 8 MG: 4 TABLET, ORALLY DISINTEGRATING ORAL at 12:06

## 2018-01-01 RX ADMIN — LOSARTAN POTASSIUM 100 MG: 100 TABLET, FILM COATED ORAL at 08:25

## 2018-01-01 RX ADMIN — ONDANSETRON 8 MG: 4 TABLET, ORALLY DISINTEGRATING ORAL at 06:51

## 2018-01-01 RX ADMIN — METOPROLOL SUCCINATE 25 MG: 25 TABLET, EXTENDED RELEASE ORAL at 08:32

## 2018-01-01 RX ADMIN — PANTOPRAZOLE SODIUM 40 MG: 40 TABLET, DELAYED RELEASE ORAL at 06:34

## 2018-01-01 RX ADMIN — SODIUM CHLORIDE 1000 ML: 9 INJECTION, SOLUTION INTRAVENOUS at 07:29

## 2018-01-01 RX ADMIN — METOPROLOL SUCCINATE 25 MG: 25 TABLET, EXTENDED RELEASE ORAL at 20:40

## 2018-01-01 RX ADMIN — SODIUM CHLORIDE: 9 INJECTION, SOLUTION INTRAVENOUS at 05:30

## 2018-01-01 RX ADMIN — LOSARTAN POTASSIUM 100 MG: 100 TABLET, FILM COATED ORAL at 08:19

## 2018-01-01 RX ADMIN — PANTOPRAZOLE SODIUM 40 MG: 40 INJECTION, POWDER, FOR SOLUTION INTRAVENOUS at 08:41

## 2018-01-01 RX ADMIN — METOPROLOL SUCCINATE 25 MG: 25 TABLET, EXTENDED RELEASE ORAL at 09:17

## 2018-01-01 RX ADMIN — METOPROLOL TARTRATE 5 MG: 5 INJECTION, SOLUTION INTRAVENOUS at 08:17

## 2018-01-01 RX ADMIN — PANTOPRAZOLE SODIUM 40 MG: 40 TABLET, DELAYED RELEASE ORAL at 06:51

## 2018-01-01 RX ADMIN — ONDANSETRON 4 MG: 2 INJECTION INTRAMUSCULAR; INTRAVENOUS at 20:05

## 2018-01-01 RX ADMIN — METOPROLOL SUCCINATE 25 MG: 25 TABLET, EXTENDED RELEASE ORAL at 00:08

## 2018-01-01 RX ADMIN — LOSARTAN POTASSIUM 100 MG: 100 TABLET, FILM COATED ORAL at 08:59

## 2018-01-01 RX ADMIN — AZITHROMYCIN MONOHYDRATE 250 MG: 250 TABLET ORAL at 08:29

## 2018-01-01 RX ADMIN — LOSARTAN POTASSIUM 100 MG: 100 TABLET, FILM COATED ORAL at 08:40

## 2018-01-01 RX ADMIN — METOCLOPRAMIDE HYDROCHLORIDE 10 MG: 5 TABLET ORAL at 16:06

## 2018-01-01 RX ADMIN — ONDANSETRON 8 MG: 4 TABLET, ORALLY DISINTEGRATING ORAL at 11:07

## 2018-01-01 RX ADMIN — SODIUM CHLORIDE 1000 ML: 9 INJECTION, SOLUTION INTRAVENOUS at 08:17

## 2018-01-01 RX ADMIN — HYOSCYAMINE SULFATE 250 MCG: 0.12 TABLET ORAL at 18:51

## 2018-01-01 RX ADMIN — PANTOPRAZOLE SODIUM 40 MG: 40 INJECTION, POWDER, FOR SOLUTION INTRAVENOUS at 09:28

## 2018-01-01 RX ADMIN — SUCRALFATE 1 G: 1 SUSPENSION ORAL at 12:07

## 2018-01-01 RX ADMIN — SUCRALFATE 1 G: 1 SUSPENSION ORAL at 06:56

## 2018-01-01 RX ADMIN — SUCRALFATE 1 G: 1 SUSPENSION ORAL at 06:34

## 2018-01-01 RX ADMIN — Medication 3500 UNITS: at 19:20

## 2018-01-01 RX ADMIN — AZITHROMYCIN 200 MG: 200 POWDER, FOR SUSPENSION ORAL at 08:17

## 2018-01-01 RX ADMIN — SODIUM CHLORIDE, PRESERVATIVE FREE 86 ML: 5 INJECTION INTRAVENOUS at 01:05

## 2018-01-01 RX ADMIN — LOSARTAN POTASSIUM 100 MG: 100 TABLET, FILM COATED ORAL at 08:17

## 2018-01-01 RX ADMIN — METOCLOPRAMIDE 10 MG: 5 TABLET ORAL at 17:07

## 2018-01-01 RX ADMIN — ONDANSETRON 4 MG: 2 INJECTION INTRAMUSCULAR; INTRAVENOUS at 12:42

## 2018-01-01 RX ADMIN — ONDANSETRON 8 MG: 4 TABLET, ORALLY DISINTEGRATING ORAL at 07:29

## 2018-01-01 RX ADMIN — METOCLOPRAMIDE 10 MG: 5 INJECTION, SOLUTION INTRAMUSCULAR; INTRAVENOUS at 08:17

## 2018-01-01 RX ADMIN — METOCLOPRAMIDE 10 MG: 5 INJECTION, SOLUTION INTRAMUSCULAR; INTRAVENOUS at 10:39

## 2018-01-01 RX ADMIN — METOCLOPRAMIDE 10 MG: 5 INJECTION, SOLUTION INTRAMUSCULAR; INTRAVENOUS at 21:44

## 2018-01-01 RX ADMIN — METOPROLOL SUCCINATE 25 MG: 25 TABLET, EXTENDED RELEASE ORAL at 22:27

## 2018-01-01 RX ADMIN — SUCRALFATE 1 G: 1 SUSPENSION ORAL at 21:47

## 2018-01-01 RX ADMIN — SUCRALFATE 1 G: 1 SUSPENSION ORAL at 22:39

## 2018-01-01 RX ADMIN — ONDANSETRON 8 MG: 4 TABLET, ORALLY DISINTEGRATING ORAL at 06:31

## 2018-01-01 RX ADMIN — METOPROLOL TARTRATE 5 MG: 5 INJECTION, SOLUTION INTRAVENOUS at 05:18

## 2018-01-01 RX ADMIN — SUCRALFATE 1 G: 1 TABLET ORAL at 18:52

## 2018-01-01 RX ADMIN — FUROSEMIDE 20 MG: 20 TABLET ORAL at 08:40

## 2018-01-01 RX ADMIN — POTASSIUM CHLORIDE 40 MEQ: 1500 TABLET, EXTENDED RELEASE ORAL at 12:25

## 2018-01-01 RX ADMIN — LOSARTAN POTASSIUM 100 MG: 100 TABLET, FILM COATED ORAL at 08:32

## 2018-01-01 RX ADMIN — SODIUM CHLORIDE: 9 INJECTION, SOLUTION INTRAVENOUS at 13:02

## 2018-01-01 RX ADMIN — FUROSEMIDE 20 MG: 20 TABLET ORAL at 16:40

## 2018-01-01 RX ADMIN — ONDANSETRON 4 MG: 2 INJECTION INTRAMUSCULAR; INTRAVENOUS at 07:33

## 2018-01-01 RX ADMIN — SUCRALFATE 1 G: 1 SUSPENSION ORAL at 21:12

## 2018-01-01 RX ADMIN — METOCLOPRAMIDE HYDROCHLORIDE 10 MG: 5 TABLET ORAL at 21:29

## 2018-01-01 RX ADMIN — SUCRALFATE 1 G: 1 SUSPENSION ORAL at 07:44

## 2018-01-01 RX ADMIN — PANTOPRAZOLE SODIUM 40 MG: 40 INJECTION, POWDER, FOR SOLUTION INTRAVENOUS at 21:41

## 2018-01-01 RX ADMIN — ONDANSETRON 8 MG: 4 TABLET, ORALLY DISINTEGRATING ORAL at 17:07

## 2018-01-01 RX ADMIN — IOPAMIDOL 76 ML: 755 INJECTION, SOLUTION INTRAVENOUS at 01:05

## 2018-01-01 RX ADMIN — SODIUM CHLORIDE 66 ML: 9 INJECTION, SOLUTION INTRAVENOUS at 07:22

## 2018-01-01 RX ADMIN — LOSARTAN POTASSIUM 100 MG: 100 TABLET, FILM COATED ORAL at 09:35

## 2018-01-01 RX ADMIN — SUCRALFATE 1 G: 1 SUSPENSION ORAL at 11:37

## 2018-01-01 RX ADMIN — FENTANYL CITRATE 100 MCG: 50 INJECTION, SOLUTION INTRAMUSCULAR; INTRAVENOUS at 00:01

## 2018-01-01 RX ADMIN — LOSARTAN POTASSIUM AND HYDROCHLOROTHIAZIDE 1 TABLET: 100; 12.5 TABLET, FILM COATED ORAL at 09:27

## 2018-01-01 RX ADMIN — ONDANSETRON 8 MG: 4 TABLET, ORALLY DISINTEGRATING ORAL at 16:40

## 2018-01-01 RX ADMIN — METOPROLOL TARTRATE 5 MG: 5 INJECTION, SOLUTION INTRAVENOUS at 03:09

## 2018-01-01 RX ADMIN — METOPROLOL SUCCINATE 25 MG: 25 TABLET, EXTENDED RELEASE ORAL at 09:31

## 2018-01-01 RX ADMIN — POTASSIUM CHLORIDE 75 ML/HR: 149 INJECTION, SOLUTION, CONCENTRATE INTRAVENOUS at 13:36

## 2018-01-01 RX ADMIN — METOCLOPRAMIDE 10 MG: 5 INJECTION, SOLUTION INTRAMUSCULAR; INTRAVENOUS at 15:48

## 2018-01-01 RX ADMIN — METOCLOPRAMIDE 10 MG: 5 TABLET ORAL at 06:42

## 2018-01-01 RX ADMIN — METOPROLOL TARTRATE 5 MG: 5 INJECTION, SOLUTION INTRAVENOUS at 23:41

## 2018-01-01 RX ADMIN — METOPROLOL SUCCINATE 25 MG: 25 TABLET, EXTENDED RELEASE ORAL at 20:59

## 2018-01-01 RX ADMIN — PANTOPRAZOLE SODIUM 40 MG: 40 INJECTION, POWDER, FOR SOLUTION INTRAVENOUS at 00:45

## 2018-01-01 RX ADMIN — Medication 1 MILLICURIE: at 10:11

## 2018-01-01 RX ADMIN — LIDOCAINE HYDROCHLORIDE 30 ML: 20 SOLUTION ORAL; TOPICAL at 23:58

## 2018-01-01 RX ADMIN — SODIUM CHLORIDE: 9 INJECTION, SOLUTION INTRAVENOUS at 08:46

## 2018-01-01 RX ADMIN — METOPROLOL TARTRATE 5 MG: 5 INJECTION, SOLUTION INTRAVENOUS at 19:42

## 2018-01-01 RX ADMIN — PANTOPRAZOLE SODIUM 40 MG: 40 INJECTION, POWDER, FOR SOLUTION INTRAVENOUS at 01:55

## 2018-01-01 RX ADMIN — POTASSIUM CHLORIDE 20 MEQ: 1500 TABLET, EXTENDED RELEASE ORAL at 15:59

## 2018-01-01 RX ADMIN — METOCLOPRAMIDE 5 MG: 5 INJECTION, SOLUTION INTRAMUSCULAR; INTRAVENOUS at 03:29

## 2018-01-01 RX ADMIN — SUCRALFATE 1 G: 1 SUSPENSION ORAL at 15:59

## 2018-01-01 RX ADMIN — ONDANSETRON 4 MG: 2 INJECTION INTRAMUSCULAR; INTRAVENOUS at 01:19

## 2018-01-01 RX ADMIN — METOCLOPRAMIDE HYDROCHLORIDE 10 MG: 5 TABLET ORAL at 16:26

## 2018-01-01 RX ADMIN — AZITHROMYCIN MONOHYDRATE 250 MG: 250 TABLET ORAL at 08:40

## 2018-01-01 RX ADMIN — PANTOPRAZOLE SODIUM 40 MG: 40 TABLET, DELAYED RELEASE ORAL at 11:28

## 2018-01-01 RX ADMIN — PANTOPRAZOLE SODIUM 40 MG: 40 INJECTION, POWDER, FOR SOLUTION INTRAVENOUS at 09:35

## 2018-01-01 RX ADMIN — MAGNESIUM SULFATE IN WATER 2 G: 40 INJECTION, SOLUTION INTRAVENOUS at 19:06

## 2018-01-01 RX ADMIN — PROCHLORPERAZINE EDISYLATE 5 MG: 5 INJECTION INTRAMUSCULAR; INTRAVENOUS at 05:11

## 2018-01-01 RX ADMIN — ONDANSETRON 8 MG: 4 TABLET, ORALLY DISINTEGRATING ORAL at 06:42

## 2018-01-01 RX ADMIN — METOPROLOL SUCCINATE 25 MG: 25 TABLET, EXTENDED RELEASE ORAL at 20:29

## 2018-01-01 RX ADMIN — ONDANSETRON 8 MG: 4 TABLET, ORALLY DISINTEGRATING ORAL at 12:37

## 2018-01-01 RX ADMIN — ONDANSETRON 4 MG: 2 INJECTION INTRAMUSCULAR; INTRAVENOUS at 18:42

## 2018-01-01 RX ADMIN — LOSARTAN POTASSIUM 100 MG: 100 TABLET, FILM COATED ORAL at 08:29

## 2018-01-01 RX ADMIN — METOCLOPRAMIDE HYDROCHLORIDE 10 MG: 5 TABLET ORAL at 06:33

## 2018-01-01 RX ADMIN — PANTOPRAZOLE SODIUM 40 MG: 40 INJECTION, POWDER, FOR SOLUTION INTRAVENOUS at 00:10

## 2018-01-01 RX ADMIN — SUCRALFATE 1 G: 1 SUSPENSION ORAL at 22:18

## 2018-01-01 RX ADMIN — PANTOPRAZOLE SODIUM 40 MG: 40 INJECTION, POWDER, FOR SOLUTION INTRAVENOUS at 08:17

## 2018-01-01 RX ADMIN — METOPROLOL TARTRATE 5 MG: 5 INJECTION, SOLUTION INTRAVENOUS at 17:33

## 2018-01-01 RX ADMIN — METOPROLOL SUCCINATE 25 MG: 25 TABLET, EXTENDED RELEASE ORAL at 21:41

## 2018-01-01 RX ADMIN — LIDOCAINE HYDROCHLORIDE 7 ML: 20 JELLY TOPICAL at 14:45

## 2018-01-01 RX ADMIN — PANTOPRAZOLE SODIUM 40 MG: 40 TABLET, DELAYED RELEASE ORAL at 13:26

## 2018-01-01 RX ADMIN — SODIUM CHLORIDE: 9 INJECTION, SOLUTION INTRAVENOUS at 01:55

## 2018-01-01 RX ADMIN — SODIUM CHLORIDE: 9 INJECTION, SOLUTION INTRAVENOUS at 16:38

## 2018-01-01 RX ADMIN — HEPARIN SODIUM 600 UNITS/HR: 10000 INJECTION, SOLUTION INTRAVENOUS at 05:33

## 2018-01-01 RX ADMIN — LOSARTAN POTASSIUM 100 MG: 100 TABLET, FILM COATED ORAL at 09:31

## 2018-01-01 RX ADMIN — METOCLOPRAMIDE HYDROCHLORIDE 10 MG: 5 TABLET ORAL at 06:51

## 2018-01-01 RX ADMIN — METOCLOPRAMIDE 10 MG: 5 TABLET ORAL at 12:06

## 2018-01-01 RX ADMIN — PANTOPRAZOLE SODIUM 40 MG: 40 TABLET, DELAYED RELEASE ORAL at 06:41

## 2018-01-01 RX ADMIN — METOCLOPRAMIDE 10 MG: 5 TABLET ORAL at 16:40

## 2018-01-01 RX ADMIN — SODIUM CHLORIDE: 9 INJECTION, SOLUTION INTRAVENOUS at 10:16

## 2018-01-01 RX ADMIN — PANTOPRAZOLE SODIUM 40 MG: 40 INJECTION, POWDER, FOR SOLUTION INTRAVENOUS at 18:39

## 2018-01-01 RX ADMIN — SODIUM CHLORIDE: 9 INJECTION, SOLUTION INTRAVENOUS at 00:17

## 2018-01-01 RX ADMIN — PROCHLORPERAZINE EDISYLATE 5 MG: 5 INJECTION INTRAMUSCULAR; INTRAVENOUS at 07:28

## 2018-01-01 RX ADMIN — CALCIUM CARBONATE (ANTACID) CHEW TAB 500 MG 500 MG: 500 CHEW TAB at 09:53

## 2018-01-01 RX ADMIN — METOCLOPRAMIDE 10 MG: 5 TABLET ORAL at 06:31

## 2018-01-01 RX ADMIN — ASPIRIN 81 MG: 81 TABLET, COATED ORAL at 00:10

## 2018-01-01 RX ADMIN — METOCLOPRAMIDE HYDROCHLORIDE 10 MG: 5 TABLET ORAL at 12:37

## 2018-01-01 RX ADMIN — Medication 10 MEQ: at 13:03

## 2018-01-01 RX ADMIN — ONDANSETRON 8 MG: 4 TABLET, ORALLY DISINTEGRATING ORAL at 06:38

## 2018-01-01 RX ADMIN — Medication 10 MEQ: at 14:47

## 2018-01-01 RX ADMIN — SUCRALFATE 1 G: 1 SUSPENSION ORAL at 15:51

## 2018-01-01 RX ADMIN — ONDANSETRON 4 MG: 2 INJECTION INTRAMUSCULAR; INTRAVENOUS at 04:28

## 2018-01-01 RX ADMIN — METOPROLOL TARTRATE 5 MG: 5 INJECTION, SOLUTION INTRAVENOUS at 11:25

## 2018-01-01 RX ADMIN — SUCRALFATE 1 G: 1 SUSPENSION ORAL at 11:53

## 2018-01-01 RX ADMIN — METOCLOPRAMIDE 10 MG: 5 TABLET ORAL at 11:07

## 2018-01-01 RX ADMIN — METOPROLOL SUCCINATE 25 MG: 25 TABLET, EXTENDED RELEASE ORAL at 22:18

## 2018-01-01 RX ADMIN — ONDANSETRON 8 MG: 4 TABLET, ORALLY DISINTEGRATING ORAL at 12:14

## 2018-01-01 RX ADMIN — PROCHLORPERAZINE EDISYLATE 5 MG: 5 INJECTION INTRAMUSCULAR; INTRAVENOUS at 13:31

## 2018-01-01 RX ADMIN — SUCRALFATE 1 G: 1 SUSPENSION ORAL at 17:44

## 2018-01-01 RX ADMIN — METOPROLOL SUCCINATE 25 MG: 25 TABLET, EXTENDED RELEASE ORAL at 08:25

## 2018-01-01 RX ADMIN — ONDANSETRON 8 MG: 4 TABLET, ORALLY DISINTEGRATING ORAL at 16:06

## 2018-01-01 RX ADMIN — POTASSIUM CHLORIDE: 149 INJECTION, SOLUTION, CONCENTRATE INTRAVENOUS at 14:16

## 2018-01-01 RX ADMIN — IOPAMIDOL 81 ML: 755 INJECTION, SOLUTION INTRAVENOUS at 07:22

## 2018-01-01 RX ADMIN — SUCRALFATE 1 G: 1 SUSPENSION ORAL at 16:26

## 2018-01-01 RX ADMIN — METOPROLOL SUCCINATE 25 MG: 25 TABLET, EXTENDED RELEASE ORAL at 21:38

## 2018-01-01 RX ADMIN — ONDANSETRON 4 MG: 2 INJECTION INTRAMUSCULAR; INTRAVENOUS at 06:27

## 2018-01-01 RX ADMIN — AZITHROMYCIN MONOHYDRATE 250 MG: 500 INJECTION, POWDER, LYOPHILIZED, FOR SOLUTION INTRAVENOUS at 09:35

## 2018-01-01 RX ADMIN — SODIUM CHLORIDE: 9 INJECTION, SOLUTION INTRAVENOUS at 22:41

## 2018-01-01 RX ADMIN — METOPROLOL SUCCINATE 25 MG: 25 TABLET, EXTENDED RELEASE ORAL at 19:56

## 2018-01-01 RX ADMIN — PANTOPRAZOLE SODIUM 40 MG: 40 TABLET, DELAYED RELEASE ORAL at 06:31

## 2018-01-01 RX ADMIN — FAMOTIDINE 20 MG: 10 INJECTION INTRAVENOUS at 00:05

## 2018-01-01 RX ADMIN — SODIUM CHLORIDE 1000 ML: 9 INJECTION, SOLUTION INTRAVENOUS at 17:59

## 2018-01-01 RX ADMIN — SUCRALFATE 1 G: 1 SUSPENSION ORAL at 00:17

## 2018-01-01 RX ADMIN — PANTOPRAZOLE SODIUM 40 MG: 40 INJECTION, POWDER, FOR SOLUTION INTRAVENOUS at 08:44

## 2018-01-01 RX ADMIN — SODIUM CHLORIDE: 9 INJECTION, SOLUTION INTRAVENOUS at 06:15

## 2018-01-01 RX ADMIN — Medication 10 MEQ: at 19:57

## 2018-01-01 RX ADMIN — ACETAMINOPHEN 650 MG: 325 TABLET, FILM COATED ORAL at 13:54

## 2018-01-01 RX ADMIN — ONDANSETRON 8 MG: 4 TABLET, ORALLY DISINTEGRATING ORAL at 11:25

## 2018-01-01 RX ADMIN — ONDANSETRON 4 MG: 2 INJECTION INTRAMUSCULAR; INTRAVENOUS at 11:27

## 2018-01-01 RX ADMIN — METOPROLOL SUCCINATE 25 MG: 25 TABLET, EXTENDED RELEASE ORAL at 08:19

## 2018-01-01 RX ADMIN — ONDANSETRON 4 MG: 2 INJECTION INTRAMUSCULAR; INTRAVENOUS at 12:39

## 2018-01-01 RX ADMIN — SODIUM CHLORIDE: 9 INJECTION, SOLUTION INTRAVENOUS at 20:18

## 2018-01-01 RX ADMIN — LOSARTAN POTASSIUM AND HYDROCHLOROTHIAZIDE 1 TABLET: 100; 12.5 TABLET, FILM COATED ORAL at 15:05

## 2018-01-01 RX ADMIN — METOCLOPRAMIDE 10 MG: 5 INJECTION, SOLUTION INTRAMUSCULAR; INTRAVENOUS at 19:50

## 2018-01-01 RX ADMIN — AZITHROMYCIN MONOHYDRATE 250 MG: 500 INJECTION, POWDER, LYOPHILIZED, FOR SOLUTION INTRAVENOUS at 11:06

## 2018-01-01 RX ADMIN — ONDANSETRON 4 MG: 2 INJECTION INTRAMUSCULAR; INTRAVENOUS at 03:34

## 2018-01-01 RX ADMIN — METOPROLOL SUCCINATE 25 MG: 25 TABLET, EXTENDED RELEASE ORAL at 21:13

## 2018-01-01 RX ADMIN — MAGNESIUM SULFATE IN WATER 2 G: 40 INJECTION, SOLUTION INTRAVENOUS at 08:26

## 2018-01-01 RX ADMIN — POTASSIUM CHLORIDE 40 MEQ: 1500 TABLET, EXTENDED RELEASE ORAL at 08:25

## 2018-01-01 RX ADMIN — METOPROLOL SUCCINATE 25 MG: 25 TABLET, EXTENDED RELEASE ORAL at 08:59

## 2018-01-01 RX ADMIN — LOSARTAN POTASSIUM AND HYDROCHLOROTHIAZIDE 1 TABLET: 100; 12.5 TABLET, FILM COATED ORAL at 08:44

## 2018-01-01 RX ADMIN — METOCLOPRAMIDE 10 MG: 5 INJECTION, SOLUTION INTRAMUSCULAR; INTRAVENOUS at 04:10

## 2018-01-01 RX ADMIN — POTASSIUM CHLORIDE: 149 INJECTION, SOLUTION, CONCENTRATE INTRAVENOUS at 21:07

## 2018-01-01 RX ADMIN — ONDANSETRON 4 MG: 2 INJECTION INTRAMUSCULAR; INTRAVENOUS at 09:35

## 2018-01-01 RX ADMIN — METOCLOPRAMIDE HYDROCHLORIDE 10 MG: 5 TABLET ORAL at 21:47

## 2018-01-01 RX ADMIN — METOCLOPRAMIDE 10 MG: 5 INJECTION, SOLUTION INTRAMUSCULAR; INTRAVENOUS at 03:10

## 2018-01-01 RX ADMIN — ONDANSETRON 8 MG: 4 TABLET, ORALLY DISINTEGRATING ORAL at 17:33

## 2018-01-01 RX ADMIN — Medication 10 MEQ: at 11:40

## 2018-01-01 ASSESSMENT — ENCOUNTER SYMPTOMS
NAUSEA: 1
CONSTIPATION: 0
CHILLS: 0
ABDOMINAL PAIN: 1
ABDOMINAL PAIN: 1
SHORTNESS OF BREATH: 1
DYSURIA: 1
FEVER: 0
MYALGIAS: 0
VOMITING: 1
APPETITE CHANGE: 1
COUGH: 1
DIARRHEA: 0
NECK PAIN: 1
VOMITING: 0
ABDOMINAL PAIN: 1
VOMITING: 1
NAUSEA: 1
NAUSEA: 1

## 2018-01-01 ASSESSMENT — ACTIVITIES OF DAILY LIVING (ADL)
AMBULATION: 0-->INDEPENDENT
ADLS_ACUITY_SCORE: 15
TOILETING: 0-->INDEPENDENT
ADLS_ACUITY_SCORE: 9
ADLS_ACUITY_SCORE: 15
ADLS_ACUITY_SCORE: 9
ADLS_ACUITY_SCORE: 15
ADLS_ACUITY_SCORE: 9
RETIRED_COMMUNICATION: 0-->UNDERSTANDS/COMMUNICATES WITHOUT DIFFICULTY
COGNITION: 0 - NO COGNITION ISSUES REPORTED
ADLS_ACUITY_SCORE: 9
ADLS_ACUITY_SCORE: 15
ADLS_ACUITY_SCORE: 9
ADLS_ACUITY_SCORE: 9
TRANSFERRING: 0-->INDEPENDENT
BATHING: 0-->INDEPENDENT
ADLS_ACUITY_SCORE: 9
ADLS_ACUITY_SCORE: 15
SWALLOWING: 0-->SWALLOWS FOODS/LIQUIDS WITHOUT DIFFICULTY
ADLS_ACUITY_SCORE: 9
ADLS_ACUITY_SCORE: 15
ADLS_ACUITY_SCORE: 9
RETIRED_EATING: 0-->INDEPENDENT
ADLS_ACUITY_SCORE: 9
FALL_HISTORY_WITHIN_LAST_SIX_MONTHS: NO
ADLS_ACUITY_SCORE: 9
DRESS: 0-->INDEPENDENT
ADLS_ACUITY_SCORE: 9
PREVIOUS_RESPONSIBILITIES: MEAL PREP;HOUSEKEEPING;LAUNDRY;SHOPPING;YARDWORK;MEDICATION MANAGEMENT;FINANCES;DRIVING
ADLS_ACUITY_SCORE: 9

## 2018-01-01 ASSESSMENT — PAIN SCALES - GENERAL: PAINLEVEL: NO PAIN (0)

## 2018-10-25 NOTE — IP AVS SNAPSHOT
[facility] [address] [facility]    [address] [place] [address]    Phone:  [phone]                                       After Visit Summary   10/25/2018    Cristal Wynn    MRN: [id number]           After Visit Summary Signature Page     I have received my discharge instructions, and my questions have been answered. I have discussed any challenges I see with this plan with the nurse or doctor.    ..........................................................................................................................................  Patient/Patient Representative Signature      ..........................................................................................................................................  Patient Representative Print Name and Relationship to Patient    ..................................................               ................................................  Date                                   Time    ..........................................................................................................................................  Reviewed by Signature/Title    ...................................................              ..............................................  Date                                               Time          22EPIC Rev 08/18

## 2018-10-25 NOTE — IP AVS SNAPSHOT
MRN:[id number]                      After Visit Summary   10/25/2018    Cristal Wynn    MRN: [id number]           Thank you!     Thank you for choosing [facility] for your care. Our goal is always to provide you with excellent care. Hearing back from our patients is one way we can continue to improve our services. Please take a few minutes to complete the written survey that you may receive in the mail after you visit with us. Thank you!        Patient Information     Date Of Birth          1935        Designated Caregiver       Most Recent Value    Caregiver    Will someone help with your care after discharge? yes    Name of designated caregiver Shira    Phone number of caregiver [phone]    Caregiver address [address] [facility]      About your hospital stay     You were admitted on:  October 26, 2018 You last received care in the:  [facility] [facility] [address] [facility]    You were discharged on:  October 28, 2018        Reason for your hospital stay       Reflux esophagitis                  Who to Call     For medical emergencies, please call 911.  For non-urgent questions about your medical care, please call your primary care provider or clinic, [phone]  For questions related to your surgery, please call your surgery clinic        Attending Provider     Provider Specialty    Magdy French MD Emergency Medicine    Fred Chiu MD Internal Medicine    Gabe Flynn MD Internal Medicine       Primary Care Provider Office Phone # Fax #    Mora Bang [phone]      After Care Instructions     Activity       Your activity upon discharge: activity as tolerated            Diet       Follow this diet upon discharge: Orders Placed This Encounter      Mechanical/Dental Soft Diet            Discharge Instructions       No NSAID's- Aleve, Motrin, etc.                  Follow-up Appointments     Follow-up and recommended labs and tests      "   Follow up with primary care provider, Mora Bang, within 7 days for hospital follow- up.  No follow up labs or test are needed. GI clinic 1 month.                  Pending Results     Date and Time Order Name Status Description    10/26/2018 0920 Surgical pathology exam In process             Statement of Approval     Ordered          10/28/18 0907  I have reviewed and agree with all the recommendations and orders detailed in this document.  EFFECTIVE NOW     Approved and electronically signed by:  Allen Shoemaker MD             Admission Information     Date & Time Provider Department Dept. Phone    10/25/2018 Gabe Flynn MD [place] [facility] [address] [facility] [phone]      Your Vitals Were     Blood Pressure Pulse Temperature Respirations Height Weight    125/69 (BP Location: Left arm) 68 98.2  F (36.8  C) (Oral) 16 1.499 m (4' 11.02\") 74.4 kg (164 lb 0.4 oz)    Pulse Oximetry BMI (Body Mass Index)                95% 33.11 kg/m2          [employer] Information     [employer] lets you send messages to your doctor, view your test results, renew your prescriptions, schedule appointments and more. To sign up, go to [email or web address] . Click on \"Log in\" on the left side of the screen, which will take you to the Welcome page. Then click on \"Sign up Now\" on the right side of the page.     You will be asked to enter the access code listed below, as well as some personal information. Please follow the directions to create your username and password.     Your access code is: [email or web address]  Expires: 2019 11:09 AM     Your access code will  in 90 days. If you need help or a new code, please call your [facility] clinic or [phone].        Care EveryWhere ID     This is your Care EveryWhere ID. This could be used by other organizations to access your [facility] medical records  [id number]        Equal Access to Services     RUDDY RAE AH: carmela Montesinos, " ivette craigaan ah. So [facility] [phone].    ATENCIÓN: Si trenton mcduffie, tiene a birch disposición servicios gratuitos de asistencia lingüística. Saima al [phone].    We comply with applicable federal civil rights laws and [place] laws. We do not discriminate on the basis of race, color, national origin, age, disability, sex, sexual orientation, or gender identity.               Review of your medicines      START taking        Dose / Directions    pantoprazole 20 MG EC tablet   Commonly known as:  PROTONIX   Used for:  Gastroesophageal reflux disease with esophagitis        Take by mouth 30-60 minutes before breakfast   Quantity:  30 tablet   Refills:  1       sucralfate 1 GM/10ML suspension   Commonly known as:  CARAFATE        Dose:  1 g   Take 10 mLs (1 g) by mouth 4 times daily (before meals and nightly)   Quantity:  1200 mL   Refills:  0         CONTINUE these medicines which may have CHANGED, or have new prescriptions. If we are uncertain of the size of tablets/capsules you have at home, strength may be listed as something that might have changed.        Dose / Directions    omeprazole 20 MG CR capsule   Commonly known as:  priLOSEC   This may have changed:    - when to take this  - reasons to take this        Dose:  20 mg   Take 1 capsule (20 mg) by mouth daily (with dinner)   Refills:  0         CONTINUE these medicines which have NOT CHANGED        Dose / Directions    losartan-hydrochlorothiazide 100-12.5 MG per tablet   Commonly known as:  HYZAAR        Dose:  1 tablet   Take 1 tablet by mouth every morning   Refills:  0       METOPROLOL SUCCINATE ER PO        Dose:  25 mg   Take 25 mg by mouth At Bedtime   Refills:  0       order for DME   Used for:  Arthritis of knee, right        Equipment being ordered: Walker Wheels () and Walker () Treatment Diagnosis: Right total knee replacement   Quantity:  1 each   Refills:  0       TYLENOL PO         Dose:  325-650 mg   Take 325-650 mg by mouth every 6 hours as needed for mild pain or fever   Refills:  0            Where to get your medicines      These medications were sent to [facility] [address] [place] [address] - [address] AT [facility] [address]  [address], [facility] [address]     Phone:  [phone]     pantoprazole 20 MG EC tablet    sucralfate 1 GM/10ML suspension         Some of these will need a paper prescription and others can be bought over the counter. Ask your nurse if you have questions.     You don't need a prescription for these medications     omeprazole 20 MG CR capsule                Protect others around you: Learn how to safely use, store and throw away your medicines at www.[email or web address]([email or web address])eds.org.             Medication List: This is a list of all your medications and when to take them. Check marks below indicate your daily home schedule. Keep this list as a reference.      Medications           Morning Afternoon Evening Bedtime As Needed    losartan-hydrochlorothiazide 100-12.5 MG per tablet   Commonly known as:  HYZAAR   Take 1 tablet by mouth every morning   Last time this was given:  1 tablet on 10/28/2018  9:27 AM                                METOPROLOL SUCCINATE ER PO   Take 25 mg by mouth At Bedtime   Last time this was given:  25 mg on 10/27/2018  9:41 PM                                omeprazole 20 MG CR capsule   Commonly known as:  priLOSEC   Take 1 capsule (20 mg) by mouth daily (with dinner)                                order for DME   Equipment being ordered: Walker Wheels () and Walker () Treatment Diagnosis: Right total knee replacement                                pantoprazole 20 MG EC tablet   Commonly known as:  PROTONIX   Take by mouth 30-60 minutes before breakfast                                sucralfate 1 GM/10ML suspension   Commonly known as:  CARAFATE   Take 10 mLs (1 g) by mouth 4 times daily (before meals  and nightly)   Last time this was given:  1 g on 10/28/2018  7:44 AM                                TYLENOL PO   Take 325-650 mg by mouth every 6 hours as needed for mild pain or fever   Last time this was given:  650 mg on 10/26/2018  1:54 PM                                          More Information        Esophagitis     With esophagitis, the lining of the esophagus is inflamed.   Do you often have burning pain in your chest? You may have esophagitis. This is when the lining of the esophagus becomes red and swollen (inflamed). The esophagus is the tube that connects your throat to your stomach. This sheet tells you more about esophagitis. It also explains your treatment options.  Main types of esophagitis  Reflux esophagitis. This is the more common type. It is caused by GERD (gastroesophageal reflux disease). Stomach contents with stomach acid flow back up into the esophagus. This happens over and over. It leads to inflammation. Risk factors can include:    Being overweight    Asthma    Smoking    Pregnancy    Frequent vomiting    Certain medicines (such as aspirin and other anti-inflammatories)    Hiatal hernia  Infectious esophagitis. This is caused by an infection. You are more at risk for this if you have a weakened immune system and poor nutrition. Antibiotic use can also be a factor. The infection is often due to the following:    A type of fungus (typically candida)    A virus, such as herpes simplex virus 1 (HSV-1) or cytomegalovirus (CMV)  Eosinophilic esophagitis. Foods or other things around you can give you an allergic reaction. This triggers an immune response and leads to esophagitis.  Pill-induced esophagitis. Certain types of medicines can cause inflammation and ulcers in the esophagus. These include doxycycline, aspirin, NSAIDs, alendronate, potassium, quinidine, iron.  Symptoms of esophagitis  The following symptoms can occur with esophagitis:    Pain when swallowing, or trouble  swallowing    Pain behind your breastbone (heartburn)    Acid regurgitation    Chronic sore throat    Gum Inflammation    Cavities    Bad breath    Nausea    Pain in your upper belly (abdomen)    Bleeding (indicated by bright red vomit or black, tarry stool)  These symptoms occur more often with reflux esophagitis:    Coughing, wheezing, or asthma    Hoarseness  Diagnosis of esophagitis  Your healthcare provider will ask about your health history and symptoms. You ll also be examined. Sometimes certain tests are needed. These may include:    Upper endoscopy. A thin, flexible tube with a tiny light and camera is used. It is inserted through the mouth down into the esophagus. This lets the provider look for damage. A small sample of tissue (biopsy) may also be removed. The sample is sent to a lab for testing.    Upper GI X-ray with barium. An X-ray is done after you drink a substance called barium. Barium may make problems in the esophagus easier to see on an x-ray.    Esophageal pH. A soft, thin tube is passed into the esophagus through the nose or mouth for 24 hours. It measures the acid level in the esophagus.    Esophageal manometry. A soft, thin tube is passed into the esophagus through the nose or mouth. It measures muscle contractions in the esophagus.  Treatment of esophagitis  Medicines. Different medicines can help treat esophagitis. The medicine used will depend on the type of esophagitis you have. Talk with your healthcare provider.  Lifestyle changes. Making the following changes can help reduce irritation and ease your symptoms:    Avoid spicy foods (pepper, chili powder, kraft). Also avoid hard foods (nuts, crackers, raw vegetables) and acidic foods and drinks (tomatoes, citrus fruits and juices). Other problem foods include chocolate, peppermint, nutmeg, and foods high in fat.    Until you can swallow without pain, follow a combined liquid and soft diet. Try foods such as cooked cereals, mashed  potatoes, and soups.    Take small bites and chew your food thoroughly.    Avoid large meals and heavy evening meals. Don't lie down within 2 to 3 hours of eating.    Get to or stay at a healthy weight.    Avoid alcohol, caffeine, and smoking or tobacco products.    Brush and floss your teeth    Raise your upper body by 4 to 6 inches when lying in bed. This can be done using a foam wedge. Or put blocks under the legs at the head of your bed.  Surgery. This may be needed for severe reflux esophagitis. Other noninvasive procedures to treat GERD and esophagitis are being studied. Your provider can tell you more.  Why treatment Is important  Without treatment, esophagitis can get worse. This is especially true with severe reflux esophagitis. For instance, continued symptoms can cause scarring of the esophagus. Over time, this can cause a narrowing the esophagus (stricture). This can make it hard to pass food down to the stomach. As symptoms go on they can also cause changes in the lining of the esophagus. These changes can put you at a slightly higher risk of cancer of the esophagus.   Date Last Reviewed: 7/1/2016 2000-2017 [employer]. [address] [place] [address]. All rights reserved. This information is not intended as a substitute for professional medical care. Always follow your healthcare professional's instructions.

## 2018-10-26 PROBLEM — R10.9 ABDOMINAL PAIN: Status: ACTIVE | Noted: 2018-01-01

## 2018-10-26 PROBLEM — K29.70 GASTRITIS: Status: ACTIVE | Noted: 2018-01-01

## 2018-10-26 NOTE — PLAN OF CARE
Problem: Patient Care Overview  Goal: Plan of Care/Patient Progress Review  Outcome: Improving  A/O x4. VSS on RA. Up SBA w/walker. Slight abdominal discomfort given tylenol and heat applied. Denies nausea/SOB. No chest pain. Had EGD this AM, biopsies taken and clip in esophagus. Advanced to full liquids, tolerating. GI following. BS +, abd soft, flatus +. Voiding well. Nursing will continue to monitor.

## 2018-10-26 NOTE — UTILIZATION REVIEW
"  Admission Status; Secondary Review Determination         Under the authority of the Utilization Management Committee, the utilization review process indicated a secondary review on the above patient.  The review outcome is based on review of the medical records, discussions with staff, and applying clinical experience noted on the date of the review.          (x) Observation Status Appropriate - This patient does not meet hospital inpatient criteria and is placed in observation status. If this patient's primary payer is Medicare and was admitted as an inpatient, Condition Code 44 should be used and patient status changed to \"observation\".     RATIONALE FOR DETERMINATION   83-year-old pleasant woman with medical history significant for hypertension, hyperlipidemia, mild nonobstructing obstructing coronary artery disease, GERD and diverticulitis, who was brought to the ER by her daughter for evaluation of epigastric abdominal pain.  Further evaluation with a CT scan showed finding possibly concerning for malignancy so she was admitted for further management and upper GI endoscopy which showed evidence of gastritis and esophagitis, biopsies were taken, expected to have short stay.  No clear indication for prolonged inpatient stay.  No evidence of active bleeding. The severity of illness, intensity of service provided, expected LOS and risk for adverse outcome make the care appropriate for further observation; however, doesn't meet criteria for hospital inpatient admission. Dr. Flynn  notified of this determination.    This document was produced using voice recognition software.      The information on this document is developed by the utilization review team in order for the business office to ensure compliance.  This only denotes the appropriateness of proper admission status and does not reflect the quality of care rendered.         The definitions of Inpatient Status and Observation Status used in making the " determination above are those provided in the CMS Coverage Manual, Chapter 1 and Chapter 6, section 70.4.      Sincerely,     TRACEE VALLEJO MD    System Medical Director  Utilization Management  [facility].

## 2018-10-26 NOTE — PLAN OF CARE
"Problem: Patient Care Overview  Goal: Plan of Care/Patient Progress Review  Outcome: Improving  A&Ox4. Full code. VSS on RA. Here d/t CP/ epigastric pain. Reported pain at 3/10, \"improved from before\". CT scan showed suspected malignancy. GI consulted. NPO except meds. Possible EGD/ colonoscopy planned. NS infusing at 100. Up w/ SBA and walker. Discharge possibly 2 days. Will cont to monitor.      "

## 2018-10-26 NOTE — CONSULTS
[facility]  Gastroenterology Consultation         Cristal Wynn  [address] [facility] [id number]  83 year old female    Admission Date/Time: 10/25/2018  Primary Care Provider: Mora Bang  Referring / Attending Physician:  Fabiola Chiu MD     We were asked to see the patient in consultation by Dr. Fred Chiu for evaluation of epigastric abdominal pain and abnormal findings on CT.      CC: epigastric abdominal pain    HPI:  Cristal Wynn is a 83 year old female who has a past medical history of GERD, diverticulitis,hyperlipidemia, hypertension, CAD, whom presented to ED with a on week history of progressively increasing sharp abdominal pain not improved with a PPI, Has history of heartburn for years and assumed by patient that this was cause of her pain. Has not taken any medication regularly for this Has not improved and become constant and has been associated with nausea, decrease in appetite and made worse with eating. States pain radiates from epigastric to LUQ and left shoulder and LLQ. Denies vomiting, diarrhea, constipation, weight loss, fever, chills, chest pain or black or bloody stools. Had taken aleve for pain and made symptoms much worse.  Has history of diverticulitis and has not had recurrent symptoms until now. Has history of cholecystectomy for cholelithiasis in 2016. States pain similar but on opposite side emainder of review of systems is negative.    ROS: A comprehensive ten point review of systems was negative aside from those in mentioned in the HPI.      PAST MED HX:  I have reviewed this patient's medical history and updated it with pertinent information if needed.   Past Medical History:   Diagnosis Date     Cataracts, bilateral      Cerebral hemorrhage without late effect (H)      Coronary artery disease 11/18/14    mild non obstructive-CTa     Disorder of bone and cartilage, unspecified      Family history of heart disease       Gastro-oesophageal reflux disease      History of blood transfusion     , ,      Hyperlipidaemia      Hypertension      Osteoarthrosis, unspecified whether generalized or localized, lower leg     knee     PONV (postoperative nausea and vomiting)      Right knee pain      Symptomatic cholelithiasis      Unspecified vitamin B deficiency        MEDICATIONS:   Prior to Admission Medications   Prescriptions Last Dose Informant Patient Reported? Taking?   Acetaminophen (TYLENOL PO) prn Self Yes Yes   Sig: Take 325-650 mg by mouth every 6 hours as needed for mild pain or fever   METOPROLOL SUCCINATE ER PO 10/25/2018 at Unknown time Self Yes Yes   Sig: Take 25 mg by mouth At Bedtime    losartan-hydrochlorothiazide (HYZAAR) 100-12.5 MG per tablet 10/25/2018 at am  Yes Yes   Sig: Take 1 tablet by mouth every morning   omeprazole (PRILOSEC) 20 MG CR capsule prn  Yes Yes   Sig: Take 20 mg by mouth daily as needed   order for DME   No No   Sig: Equipment being ordered: Walker Wheels () and Walker ()  Treatment Diagnosis: Right total knee replacement      Facility-Administered Medications: None       ALLERGIES:   Allergies   Allergen Reactions     Asa [Aspirin]      Excedrin Back & [Acetaminophen-Aspirin Buffered]      Abdominal pain     Morphine        SOCIAL HISTORY:  Social History   Substance Use Topics     Smoking status: Never Smoker     Smokeless tobacco: Never Used     Alcohol use Yes      Comment: wine once a month       FAMILY HISTORY:  Family History   Problem Relation Age of Onset     Prostate Cancer Father      Alcohol/Drug Brother      1 brother  ETOH/CHF     Heart Failure Brother      HEART DISEASE Brother      Other - See Comments Sister      lyphoma no recurrence     Cancer Sister      Helicobacter Pylori Brother      Obesity Brother      C.A.D. Sister      CABG/?valve at 80       PHYSICAL EXAM:   General  Alert, oriented and appears comfortable  Vital Signs with Ranges  Temp: 98.3  F  (36.8  C) Temp src: Oral BP: 141/86 Pulse: 76 Heart Rate: 74 Resp: 10 SpO2: 98 % O2 Device: Nasal cannula Oxygen Delivery: 5 LPM       Constitutional: healthy, alert, mild distress, cooperative and smiling   Cardiovascular: negative, PMI normal. No lifts, heaves, or thrills. RRR. No murmurs, clicks gallops or rub  Respiratory: negative, Percussion normal. Good diaphragmatic excursion. Lungs clear  Abdomen: Abdomen soft, tender. BS normal. No masses, organomegaly, positive findings: obese, distended, tenderness moderate epigastric, LUQ and LLQ          ADDITIONAL COMMENTS:   I reviewed the patient's new clinical lab test results.   Recent Labs   Lab Test  10/25/18   2339  09/17/17   0632  09/16/17   0634  09/15/17   0645  09/14/17   1358  12/24/16   0600  12/23/16   1330   WBC  12.8*   --    --    --    --   12.2*  20.3*   HGB  14.5   --   10.7*  11.0*  12.2  12.0  14.4   MCV  93   --    --    --    --   92  92   PLT  343  235   --    --   310  240  319     Recent Labs   Lab Test  10/25/18   2339  09/14/17   1358  12/24/16   0600  12/23/16   1415   POTASSIUM  4.2  4.3  3.9  3.9   CHLORIDE  105   --   106  103   CO2  25   --   25  26   BUN  22   --   13  21   ANIONGAP  9   --   7  8     Recent Labs   Lab Test  10/25/18   2339  12/24/16   0600  12/23/16   1415   ALBUMIN  4.0  2.7*  3.6   BILITOTAL  0.5  0.5  0.7   ALT  23  19  30   AST  35  16  30   LIPASE  153   --   639*       I reviewed the patient's new imaging results.        CONSULTATION ASSESSMENT AND PLAN:    Active Problems:    Abdominal pain    Assessment: Cristal is a pleasant 83 year old  female with a one week of epigastric abdominal pain radiating to LUQ, LLQ, and left shoulder. Has history of gastroesophageal reflux disease not treated with medication. She has a past medical history of GERD, diverticulitis,hyperlipidemia, hypertension, CAD, whom presented to ED with a on week history of progressively increasing sharp abdominal pain not improved  with a PPI. Pain increased by aleve. CBC was essentially normal with a mildly elevated white count at 12.8, CMP normal, D dimer 3levated at 2.8. CT scan of chest, abdomen and pelvis showed thickening of distal stomach concern for severe gastritis, PUD or neoplastic cause.      Plan: Will recommend patient undergo EGD today and continue with IV Protonix. To remain NPO until post procedure then advance clear liquid.                Tiesha Rangel PA-C, FACP  [facility].  Office: [phone]  Cell : [phone] (Dr. Cordova)  Cell: [phone] (Tiesha Rangel PA-C)

## 2018-10-26 NOTE — ED NOTES
[facility]  ED Nurse Handoff Report    ED Chief complaint: Chest Pain (CP=sharp for the last 3 days, intermittant with concurrent SOB feeling. Also having left shoulder blade discomfort.)      ED Diagnosis:   Final diagnoses:   Abdominal pain, epigastric       Code Status: Full Code    Allergies:   Allergies   Allergen Reactions     Asa [Aspirin]      Excedrin Back & [Acetaminophen-Aspirin Buffered]      Abdominal pain     Morphine        Activity level - Baseline/Home:  Independent    Activity Level - Current:   Independent     Needed?: No    Isolation: No  Infection: Not Applicable  Other   Bariatric?: Yes    Vital Signs:   Vitals:    10/26/18 0145 10/26/18 0200 10/26/18 0215 10/26/18 0245   BP: 118/72 116/65 112/63    Resp: 17 15 18    Temp:       TempSrc:       SpO2: 95% 95% 94% 96%   Weight:       Height:           Cardiac Rhythm: ,        Pain level: 0-10 Pain Scale: 3    Is this patient confused?: No   [place] - Suicide Severity Rating Scale Completed?  Yes  If yes, what color did the patient score?  White    Patient Report: Initial Complaint: SOB  Focused Assessment: Delightful elderly lady who arrived feeling chest pain that worsened a few hours before arrival. Hx of hypertension. She received several meds for acid reducer and Fentanyl for the pain. The pain has improved and she is sleeping.  VS stable.  Her CT scan showed several suspicious areas with concern for CA.    Tests Performed: CT, Labs, Xray  Abnormal Results:   Results for orders placed or performed during the hospital encounter of 10/25/18   CT Chest (PE) Abdomen Pelvis w Contrast    Narrative    CT CHEST PE ABDOMEN PELVIS W CONTRAST  10/26/2018 1:09 AM      HISTORY: Epigastric and left chest pain, elevated D-dimer, elevated  WBC.    TECHNIQUE: CT chest, abdomen and pelvis with intravenous contrast.  Radiation dose for this scan was reduced using automated exposure  control, adjustment of the mA and/or kV according  to patient size, or  iterative reconstruction technique. 76 mL Isovue-370.      COMPARISON: 12/23/2016.    FINDINGS:  Chest: Evaluation of the pulmonary arterial system shows no evidence  of embolus. The thoracic aorta is calcified. No aortic aneurysm or  dissection. There are coronary artery atherosclerotic calcifications.  The heart size is normal. There is suggestion of left ventricular  hypertrophy. There is no mediastinal, hilar or axillary lymph node  enlargement. There are small bilateral pleural effusions. Dependent  atelectasis bilaterally.    Abdomen: The gallbladder is absent. There is a cyst in the left lobe  of the liver. The spleen, pancreas and adrenal glands are normal in  appearance. There are several right renal cysts measuring up to 6.2  cm. Two small left renal cysts. No hydronephrosis. There is no  abdominal lymph node enlargement.    Pelvis: There is a single mildly enlarged lymph node in the right  lateral pelvis along the external iliac chain measuring 1.4 x 1.2 cm.  No other pelvic lymph node enlargement. A right hip arthroplasty  causes streak artifact through the pelvis. There are colonic  diverticula without acute diverticulitis. There are areas of  nodularity in the fat of the abdomen and abutting segments of the  transverse colon raising the question of peritoneal metastatic  disease. There is wall thickening and narrowing of the distal gastric  body. There is a small amount of ascites. No free intraperitoneal gas.  There is degenerative disease in the spine.      Impression    IMPRESSION:  1. There is no pulmonary embolus, aortic aneurysm or dissection.  2. Coronary artery atherosclerotic calcifications.  3. Small bilateral pleural effusions.  4. Small amount of ascites. Soft tissue nodularity in the fat of the  abdomen and along the surface of the transverse colon may be  peritoneal metastatic disease.  5. Wall thickening and narrowing of the distal stomach. Though this  could be due  to peristaltic wave, malignancy or inflammation could  also cause this appearance.  6. Colonic diverticula without acute diverticulitis.   CBC with platelets differential   Result Value Ref Range    WBC 12.8 (H) 4.0 - 11.0 10e9/L    RBC Count 4.65 3.8 - 5.2 10e12/L    Hemoglobin 14.5 11.7 - 15.7 g/dL    Hematocrit 43.1 35.0 - 47.0 %    MCV 93 78 - 100 fl    MCH 31.2 26.5 - 33.0 pg    MCHC 33.6 31.5 - 36.5 g/dL    RDW 12.9 10.0 - 15.0 %    Platelet Count 343 150 - 450 10e9/L    Diff Method Automated Method     % Neutrophils 77.9 %    % Lymphocytes 13.2 %    % Monocytes 7.4 %    % Eosinophils 0.8 %    % Basophils 0.4 %    % Immature Granulocytes 0.3 %    Nucleated RBCs 0 0 /100    Absolute Neutrophil 10.0 (H) 1.6 - 8.3 10e9/L    Absolute Lymphocytes 1.7 0.8 - 5.3 10e9/L    Absolute Monocytes 0.9 0.0 - 1.3 10e9/L    Absolute Eosinophils 0.1 0.0 - 0.7 10e9/L    Absolute Basophils 0.1 0.0 - 0.2 10e9/L    Abs Immature Granulocytes 0.0 0 - 0.4 10e9/L    Absolute Nucleated RBC 0.0    Basic metabolic panel   Result Value Ref Range    Sodium 139 133 - 144 mmol/L    Potassium 4.2 3.4 - 5.3 mmol/L    Chloride 105 94 - 109 mmol/L    Carbon Dioxide 25 20 - 32 mmol/L    Anion Gap 9 3 - 14 mmol/L    Glucose 98 70 - 99 mg/dL    Urea Nitrogen 22 7 - 30 mg/dL    Creatinine 0.83 0.52 - 1.04 mg/dL    GFR Estimate 66 >60 mL/min/1.7m2    GFR Estimate If Black 80 >60 mL/min/1.7m2    Calcium 9.7 8.5 - 10.1 mg/dL   Troponin I   Result Value Ref Range    Troponin I ES <0.015 0.000 - 0.045 ug/L   Hepatic panel   Result Value Ref Range    Bilirubin Direct <0.1 0.0 - 0.2 mg/dL    Bilirubin Total 0.5 0.2 - 1.3 mg/dL    Albumin 4.0 3.4 - 5.0 g/dL    Protein Total 8.0 6.8 - 8.8 g/dL    Alkaline Phosphatase 76 40 - 150 U/L    ALT 23 0 - 50 U/L    AST 35 0 - 45 U/L   D dimer quantitative   Result Value Ref Range    D Dimer 2.8 (H) 0.0 - 0.50 ug/ml FEU   Lipase   Result Value Ref Range    Lipase 153 73 - 393 U/L   EKG 12 lead   Result Value Ref Range     Interpretation ECG Click View Image link to view waveform and result        Treatments provided: pain, education     Family Comments: daughter at bedside.     OBS brochure/video discussed/provided to patient/family: No              Name of person given brochure if not patient:               Relationship to patient:       ED Medications:   Medications   lidocaine (viscous) (XYLOCAINE) 2 % 15 mL, alum & mag hydroxide-simethicone (MYLANTA ES/MAALOX  ES) 15 mL GI Cocktail (30 mLs Oral Given 10/25/18 2358)   famotidine (PEPCID) injection 20 mg (20 mg Intravenous Given 10/26/18 0005)   fentaNYL (PF) (SUBLIMAZE) injection 100 mcg (100 mcg Intravenous Given 10/26/18 0001)   ondansetron (ZOFRAN) injection 4 mg (4 mg Intravenous Given 10/25/18 2359)   iopamidol (ISOVUE-370) solution 76 mL (76 mLs Intravenous Given 10/26/18 0105)   Saline flush (86 mLs Intravenous Given 10/26/18 0105)   pantoprazole (PROTONIX) 40 mg IV push injection (40 mg Intravenous Given 10/26/18 0155)       Drips infusing?:  No    For the majority of the shift this patient was Green.   Interventions performed were none needed.    Severe Sepsis OR Septic Shock Diagnosis Present: No    To be done/followed up on inpatient unit:  pain management.     ED NURSE PHONE NUMBER: [phone]

## 2018-10-26 NOTE — PROGRESS NOTES
Hospitalist Progress Note    Date of Service: 10/26/2018         Assessment and Plan:       Cristal Wynn is an 83-year-old pleasant woman with above-mentioned medical issues who was brought to the ER by her daughter for evaluation of epigastric abdominal pain and retrosternal pain.     Epigastric abdominal pain  Assessment:The patient with a longstanding history of GERD, no prior endoscopies done, reports colonoscopy several years ago (unable to view record).  Given her presentation and CT finding, concern for malignancy and carcinomatosis.  She does have nonobstructive coronary artery disease, troponin was negative, constant pain for 2 days with negative enzymes. GI consulted, EGD showed reflux esophagitis, chronic gastritis, biopsy pending  Plan:  - Full liquid diet, advance tonight if stable  - IV PPI -> PO tomorrow if tolerating diet  - GI following  - follow biopsy    CAD  Hyperlipidemia  Hypertension  Assessment/Plan: Resumed  Prior to admission Hyzaar and metoprolol       Active Diet Order      Clear Liquid Diet (May advance to full liquids this evening if tolerates clear liquids)    DVT Prophylaxis: Pneumatic Compression Devices  Code Status: Full Code    Disposition: Expected discharge possibly 10/27    Attestation:   I have reviewed today's relevant vital signs, notes, medications, labs and imaging.I personally reviewed no images or EKG's today.    Gabe Flynn MD  [facility] Hospitalist  Text Page  (7am - 6pm)        Interval History:        Doing well post EGD  Still some mild epigastric pain   No cough/CP  No nausea/vomiting, new abdominal pain  No other physical complaints         Physical Exam:        Physical Exam   Temp: 98.1  F (36.7  C) Temp src: Oral BP: 131/65 Pulse: 76 Heart Rate: 74 Resp: 14 SpO2: 97 % O2 Device: None (Room air) Oxygen Delivery: 5 LPM  Vitals:    10/25/18 2225 10/26/18 0641   Weight: 68.9 kg (152 lb) 72.2 kg (159 lb 2.8 oz)     Vital Signs with Ranges  Temp:   [97.2  F (36.2  C)-98.3  F (36.8  C)] 98.1  F (36.7  C)  Pulse:  [76] 76  Heart Rate:  [68-87] 74  Resp:  [9-35] 14  BP: (112-166)/(56-86) 131/65  FiO2 (%):  [100 %] 100 %  SpO2:  [92 %-100 %] 97 %       Constitutional: Awake, alert, cooperative, no apparent distress  Respiratory: Clear to auscultation bilaterally, no crackles or wheezing  Cardiovascular: Regular rate and rhythm, normal S1 and S2, and no murmur noted  GI: Normal bowel sounds, soft, non-distended, mild epigastric tenderness  Skin/Integumen: No rashes, no cyanosis, no edema  Other: Normal mood and affect    # Pain Assessment:  Current Pain Score 10/26/2018   Patient currently in pain? denies   Pain score (0-10) -   Pain location -   Pain descriptors -   Cristal ramirez pain level was assessed and she currently denies pain.           Data:     CBC RESULTS:    Recent Labs  Lab 10/25/18  2339   WBC 12.8*   RBC 4.65   HGB 14.5   HCT 43.1          BASIC METABOLIC PANEL:    Recent Labs  Lab 10/25/18  2339      POTASSIUM 4.2   CHLORIDE 105   CO2 25   BUN 22   CR 0.83   GLC 98   DINH 9.7       HEPATIC FUNCTION PANEL    Recent Labs  Lab 10/25/18  2339   AST 35   ALT 23   ALKPHOS 76   BILITOTAL 0.5     INR  No results for input(s): INR in the last 168 hours.    OTHER LABS  None    Medications     sodium chloride 100 mL/hr at 10/26/18 0354       [START ON 10/27/2018] losartan-hydrochlorothiazide  1 tablet Oral QAM     metoprolol succinate (TOPROL-XL) 24 hr tablet 25 mg  25 mg Oral At Bedtime     pantoprazole (PROTONIX) IV  40 mg Intravenous Daily with breakfast         Recent Results (from the past 24 hour(s))   CT Chest (PE) Abdomen Pelvis w Contrast    Narrative    CT CHEST PE ABDOMEN PELVIS W CONTRAST  10/26/2018 1:09 AM      HISTORY: Epigastric and left chest pain, elevated D-dimer, elevated  WBC.    TECHNIQUE: CT chest, abdomen and pelvis with intravenous contrast.  Radiation dose for this scan was reduced using automated exposure  control, adjustment  of the mA and/or kV according to patient size, or  iterative reconstruction technique. 76 mL Isovue-370.      COMPARISON: 12/23/2016.    FINDINGS:  Chest: Evaluation of the pulmonary arterial system shows no evidence  of embolus. The thoracic aorta is calcified. No aortic aneurysm or  dissection. There are coronary artery atherosclerotic calcifications.  The heart size is normal. There is suggestion of left ventricular  hypertrophy. There is no mediastinal, hilar or axillary lymph node  enlargement. There are small bilateral pleural effusions. Dependent  atelectasis bilaterally.    Abdomen: The gallbladder is absent. There is a cyst in the left lobe  of the liver. The spleen, pancreas and adrenal glands are normal in  appearance. There are several right renal cysts measuring up to 6.2  cm. Two small left renal cysts. No hydronephrosis. There is no  abdominal lymph node enlargement.    Pelvis: There is a single mildly enlarged lymph node in the right  lateral pelvis along the external iliac chain measuring 1.4 x 1.2 cm.  No other pelvic lymph node enlargement. A right hip arthroplasty  causes streak artifact through the pelvis. There are colonic  diverticula without acute diverticulitis. There are areas of  nodularity in the fat of the abdomen and abutting segments of the  transverse colon raising the question of peritoneal metastatic  disease. There is wall thickening and narrowing of the distal gastric  body. There is a small amount of ascites. No free intraperitoneal gas.  There is degenerative disease in the spine.      Impression    IMPRESSION:  1. There is no pulmonary embolus, aortic aneurysm or dissection.  2. Coronary artery atherosclerotic calcifications.  3. Small bilateral pleural effusions.  4. Small amount of ascites. Soft tissue nodularity in the fat of the  abdomen and along the surface of the transverse colon may be  peritoneal metastatic disease.  5. Wall thickening and narrowing of the distal  stomach. Though this  could be due to peristaltic wave, malignancy or inflammation could  also cause this appearance.  6. Colonic diverticula without acute diverticulitis.

## 2018-10-26 NOTE — PHARMACY-ADMISSION MEDICATION HISTORY
Admission medication history interview status for the 10/25/2018  admission is complete. See EPIC admission navigator for prior to admission medications     Medication history source reliability:Good    Actions taken by pharmacist (provider contacted, etc): spoke with patient and called [facility].     Additional medication history information not noted on PTA med list :None    Medication reconciliation/reorder completed by provider prior to medication history? No    Time spent in this activity: 20 min     Prior to Admission medications    Medication Sig Last Dose Taking? Auth Provider   Acetaminophen (TYLENOL PO) Take 325-650 mg by mouth every 6 hours as needed for mild pain or fever prn Yes Reported, Patient   losartan-hydrochlorothiazide (HYZAAR) 100-12.5 MG per tablet Take 1 tablet by mouth every morning 10/25/2018 at am Yes Unknown, Entered By History   METOPROLOL SUCCINATE ER PO Take 25 mg by mouth At Bedtime  10/25/2018 at Unknown time Yes Reported, Patient   omeprazole (PRILOSEC) 20 MG CR capsule Take 20 mg by mouth daily as needed prn Yes Unknown, Entered By History   order for DME Equipment being ordered: Walker Wheels () and Walker ()  Treatment Diagnosis: Right total knee replacement   Constantino Andrews MD
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
## 2018-10-26 NOTE — CONSULTS
Patient admitted with abdominal pain. Observation status reviewed,verbalizes understanding,lives alone,intends to be dcd home,she is independent,states daughter will stay with her if needed at discharge.

## 2018-10-26 NOTE — PROGRESS NOTES
RECEIVING UNIT ED HANDOFF REVIEW    ED Nurse Handoff Report was reviewed by: LAM SORTO on October 26, 2018 at 3:16 AM

## 2018-10-26 NOTE — ED PROVIDER NOTES
History     Chief Complaint:  Chest Pain     HPI   Cristal Wynn is a 83 year old female with a history of hyperlipidemia, hypertension, coronary artery disease, and GERD who presents to the emergency department today for evaluation of chest pain.  The patient reports that last week, she developed the onset of intermittent mid sternal, pleuritic chest pain and epigastric pain. She denies any clear provoking factors for this, although she states it is exacerbated by taking Aleve. The patient tried taking antacids and eating less greasy foods without symptom relief.  Pain has been constant for the last 3 days.  Tonight at 2030, the patient reports that her chest pain suddenly became severe. She decided to present to the ED secondary to the severity of pain. Here, she states that her pain has decreased to an 8/10 and radiates to her left shoulder blade. She additionally notes some shortness of breath, dry cough, and nausea with this. She denies any fevers, chills, myalgias, vomiting, or bloody stools.    Cardiac/PE/DVT Risk Factors:  The patient has a history of hypertension, hyperlipidemia, and coronary artery disease. She reports a family history of heart failure and coronary artery disease. The patient denies any personal or familial history of PE, DVT, or clotting disorder. The patient reports no recent travel, surgery, or other immobilizations.      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    Coronary artery disease   Disorder of bone and cartilage, unspecified    Gastro-oesophageal reflux disease    Hyperlipidaemia   Hypertension   Osteoarthrosis, unspecified whether generalized or localized, lower leg   PONV (postoperative nausea and vomiting)   Symptomatic cholelithiasis   Unspecified vitamin B deficiency     Past Surgical History:    Arthroplasty knee, right  Cosmetic  "surgery  Hysterectomy   section x2  Head & neck surgery   Laparoscopic cholecystectomy  Right hip joint replacement x2  Phacoemulsification clear cornea with standard intraocular lens implant, right and left     Family History:    Father: Prostate cancer  Brother: Alcohol/drug use, heart failure, obesity   Sister: Lymphoma, coronary artery disease    Social History:  The patient was accompanied to the ED by .  Smoking Status: Never Smoker  Alcohol Use: Positive   Marital Status:       Review of Systems   Constitutional: Negative for chills and fever.   Respiratory: Positive for cough and shortness of breath.    Cardiovascular: Positive for chest pain.   Gastrointestinal: Positive for abdominal pain and nausea. Negative for vomiting.   Musculoskeletal: Negative for myalgias.   All other systems reviewed and are negative.      Physical Exam     Patient Vitals for the past 24 hrs:   BP Temp Temp src Heart Rate Resp SpO2 Height Weight   10/26/18 0336 131/57 - - - - - - -   10/26/18 0245 - - - - - 96 % - -   10/26/18 0215 112/63 - - 85 18 94 % - -   10/26/18 0200 116/65 - - 84 15 95 % - -   10/26/18 0145 118/72 - - 85 17 95 % - -   10/26/18 0115 141/69 - - 82 22 97 % - -   10/26/18 0045 145/80 - - 78 19 97 % - -   10/26/18 0030 - - - 71 11 95 % - -   10/26/18 0015 159/57 - - 75 12 97 % - -   10/26/18 0000 159/72 - - 79 (!) 35 98 % - -   10/25/18 2345 155/85 - - 73 14 100 % - -   10/25/18 2340 155/85 - - 68 21 99 % - -   10/25/18 2330 - - - 74 9 98 % - -   10/25/18 2325 151/79 - - 76 13 98 % - -   10/25/18 2225 166/79 97.2  F (36.2  C) Temporal 87 18 98 % 1.499 m (4' 11\") 68.9 kg (152 lb)        Physical Exam  Constitutional:  Appears uncomfortable. Cooperative.   HENT:   Head:    Atraumatic.   Mouth/Throat:   Oropharynx is without erythema or exudate and mucous     membranes are moist.   Eyes:    Conjunctivae normal and EOM are normal.      Pupils are equal, round, and reactive to light.   Neck:    Normal " range of motion. Neck supple.   Cardiovascular:  Normal rate, regular rhythm, normal heart sounds and radial and    dorsalis pedis pulses are 2+ and symmetric.  No murmurs, rubs, or gallops.  Pulmonary/Chest:  Effort normal and breath sounds normal.   Abdominal:   Soft. Bowel sounds are normal.      No splenomegaly or hepatomegaly. Tender with deep palpation in the mid epigastric abdomen. No rebound.   Musculoskeletal:  Normal range of motion. No edema and no tenderness. No calf tenderness.  Neurological:  Alert. Normal strength. No cranial nerve deficit.  Skin:    Skin is warm and dry.   Psychiatric:   Normal mood and affect.      Emergency Department Course   ECG:  Indication: Chest pain  Time: 2232  Vent. Rate 86 bpm. OH interval 168. QRS duration 82. QT/QTc 372/445. P-R-T axis 29 -15 26.  Normal sinus rhythm. Lateral infarct, age undetermined. Inferior infarct, age undetermined. Abnormal ECG. No significant change compared to EKG dated 12/23/16. Read time: 2340      Imaging:  Radiographic findings were communicated with the patient who voiced understanding of the findings.  CT Chest (PE) Abdomen Pelvis w/ IV contrast:   1. There is no pulmonary embolus, aortic aneurysm or dissection.  2. Coronary artery atherosclerotic calcifications.  3. Small bilateral pleural effusions.  4. Small amount of ascites. Soft tissue nodularity in the fat of the  abdomen and along the surface of the transverse colon may be  peritoneal metastatic disease.  5. Wall thickening and narrowing of the distal stomach. Though this  could be due to peristaltic wave, malignancy or inflammation could  also cause this appearance.  6. Colonic diverticula without acute diverticulitis. As per radiology.      Laboratory:  CBC: WBC: 12.8 (H), HGB: 14.5, PLT: 343   BMP: All WNL (Creatinine: 0.83)    2339 Troponin: <0.015     Hepatic panel: All WNL    Lipase: 153    D dimer: 2.8 (H)    Interventions:  2358 GI Cocktail (Maalox/Mylanta and viscous  Lidocaine), 30 mL suspension, PO    2350 Zofran 4 mg IV  0001 Fentanyl 100 mcg IV  0005 Pepcid 20 mg IV  0155 Protonix 40 mg IV  Please see MAR for full list of medications administered in the ED.     Emergency Department Course:  Nursing notes and vitals reviewed. (2340) I performed an exam of the patient as documented above.     EKG obtained in the ED, see results above.      IV inserted. Medicine administered as documented above. Blood drawn. This was sent to the lab for further testing, results above.    The patient was sent for a chest and abdominal CT while in the emergency department, findings above.     (0034) I rechecked the patient and discussed the results of her workup thus far.     (0245) I reevaluated the patient and provided an update in regards to her imaging results and ED course.      (0319)  I consulted with Dr. Chiu of the hospitalist services. They are in agreement to accept the patient for admission.    Findings and plan explained to the Patient who consents to admission. Discussed the patient with Dr. Chiu, who will admit the patient to an inpatient medical bed for further monitoring, evaluation, and treatment.    Impression & Plan    Medical Decision Making:  Cristal Wynn is a 83 year old female who presents complaining of mid epigastric abdominal pain that radiates up into her left upper back. She has had pain off and on for the past week, but this has been more persistent and severe for the past three days. The patient states there is a pleuritic component to the pain. EKG does not look acutely ischemic and is unchanged from old. WBC is elevated and there is a left shift. HGB and platelets are normal. Metabolic panel and hepatic panel look normal. Troponin is negative, arguing against myocardial infarction as a source of the patient's pain. D dimer is elevated at 2.8, and her lipase is normal.    CT scan of the chest, abdomen, and pelvis does not show evidence for pulmonary  embolism or other acute lung pathology. However, CT scan of the abdomen shows possibly some abnormal thickening of the wall of the stomach that could be inflammatory or malignant. Possible signs of metastases are also seen intrabdominally.    The patient had some improvement in pain with a GI cocktail, Pepcid, and some IV Fentanyl. With the abnormal stomach findings, I gave her a dose of IV Protonix. Repeat exam shows some mild mid epigastric tenderness, and she has some ongoing pain. I think she should be admitted for pain control and further evaluation of the CT abnormalities. Dr. Chiu from the hospitalist service agreed to accept for admission. I discussed the test results and the plan for admission with the patient and her daughter, and they are both in understanding.    Diagnosis:    ICD-10-CM    1. Abdominal pain, epigastric R10.13        Disposition:  Admitted to Dr. Chiu.    Scribe Disclosure:  I, Tiesha Lyle, am serving as a scribe on 10/26/2018 at 12:11 AM to personally document services performed by Magdy French MD based on my observations and the provider's statements to me.      Tiesha Lyle  10/25/2018    EMERGENCY DEPARTMENT       Magdy French MD  10/26/18 0650

## 2018-10-26 NOTE — ED TRIAGE NOTES
Pt arrives walking into the ED exam room. She is complaining of severe chest pain that started about 1 week ago. Tonight at 2000 the pain was much worse. She reports it hurts more when she is taking a deep breath.  Pt placed on 2 L NC for comfort. MD called to bedside for assessment. Denies vomiting but felt nauseated late in the evening.  Denies fever.

## 2018-10-27 NOTE — PLAN OF CARE
Problem: Patient Care Overview  Goal: Plan of Care/Patient Progress Review  Outcome: Improving  A/O x4. VSS on RA. Up c SBA w/walker. Slight abdominal tenderness, no pain meds. Denies nausea/SOB. No chest pain. Full liquid diet, had EGD this AM, biopsies taken and clip in esophagus. GI following. BS +, abd soft, flatus +. Voiding well.

## 2018-10-27 NOTE — PROGRESS NOTES
[facility]    Hospitalist Progress Note      Assessment & Plan   Cristal Wynn is an 83-year-old pleasant woman with above-mentioned medical issues who was brought to the ER by her daughter for evaluation of epigastric abdominal pain and retrosternal pain.   EGD showed LA grade D reflux esophagitis and chronic gastritis.  The patient continues to have heartburn and is not tolerating anything by mouth.     LA grade D reflux esophagitis and chronic gastritis on EGD  Acute epigastric abdominal pain  The patient is still having a lot of heartburn and vomited today.  Since she is unable to tolerate anything by mouth we will keep her in the hospital today.  Continue IV fluid.  Increase IV pantoprazole to twice daily  Try adding Carafate slurry     CAD  Hyperlipidemia  Hypertension  Above chronic problems are stable     DVT Prophylaxis  Pneumatic Compression Devices    Code Status  Full Code    Disposition  Expected discharge in 1 or 2 days to home when she can tolerate a diet    Allen Shoemaker MD  Text Page (7am - 6pm)    Interval History   The patient still having heartburn and vomited today she cannot keep any food down.    -Data reviewed today: I reviewed all new labs and imaging results over the last 24 hours. I personally reviewed no images or EKG's today.    Physical Exam   Temp: 97.7  F (36.5  C) Temp src: Oral BP: 134/73 Pulse: 66 Heart Rate: 65 Resp: 16 SpO2: 96 % O2 Device: None (Room air) Oxygen Delivery: 5 LPM  Vitals:    10/25/18 2225 10/26/18 0641 10/27/18 0500   Weight: 68.9 kg (152 lb) 72.2 kg (159 lb 2.8 oz) 73.5 kg (162 lb 0.6 oz)     Vital Signs with Ranges  Temp:  [97.7  F (36.5  C)-98.3  F (36.8  C)] 97.7  F (36.5  C)  Pulse:  [66] 66  Heart Rate:  [65-86] 65  Resp:  [10-22] 16  BP: (110-153)/(55-86) 134/73  SpO2:  [92 %-98 %] 96 %  I/O last 3 completed shifts:  In: 440 [P.O.:440]  Out: -     Constitutional: Awake, alert, cooperative, no apparent distress  Respiratory:  Clear to auscultation bilaterally, no crackles or wheezing  Cardiovascular: Regular rate and rhythm, normal S1 and S2, and no murmur noted  GI: Normal bowel sounds, soft, non-distended, non-tender  Skin/Integumen: No rashes, no cyanosis, no edema  Other:     Medications     sodium chloride 100 mL/hr at 10/27/18 0846       losartan-hydrochlorothiazide  1 tablet Oral QAM     metoprolol succinate (TOPROL-XL) 24 hr tablet 25 mg  25 mg Oral At Bedtime     pantoprazole (PROTONIX) IV  40 mg Intravenous Daily with breakfast       Data     Recent Labs  Lab 10/25/18  2339   WBC 12.8*   HGB 14.5   MCV 93         POTASSIUM 4.2   CHLORIDE 105   CO2 25   BUN 22   CR 0.83   ANIONGAP 9   DINH 9.7   GLC 98   ALBUMIN 4.0   PROTTOTAL 8.0   BILITOTAL 0.5   ALKPHOS 76   ALT 23   AST 35   LIPASE 153   TROPI <0.015       No results found for this or any previous visit (from the past 24 hour(s)).

## 2018-10-27 NOTE — PLAN OF CARE
Problem: Patient Care Overview  Goal: Plan of Care/Patient Progress Review  Outcome: Improving  A/O x4. VSS on RA. Up SBA w/walker, ambulated. Denies abd pain/SOB. Episode of nausea/emesis around noon, given zofran. C/o heartburn, tums given and started on carafate with meals. GI following- was on regular diet, GI told patient to only have clears. BS +, abd soft, flatus +. Voiding well. Nursing will continue to monitor.

## 2018-10-28 NOTE — PROGRESS NOTES
Discharge    Patient discharged to home via  car with daughter  Care plan note. A/O. Up independently. CMS intact VSS. LS clear. BS active. C/O mild tenderness in her abdomen. Tolerating diet without issue. AVS reviewed and medication education complete. Patient verbalized understanding. Will be picked up by her daughter at 1300.    Listed belongings gathered and returned to patient. Yes  Care Plan and Patient education resolved: Yes  Prescriptions if needed, hard copies sent with patient  NA  Home and hospital acquired medications returned to patient: NA  Medication Bin checked and emptied on discharge Yes  Follow up appointment made for patient: No

## 2018-10-28 NOTE — PLAN OF CARE
"Problem: Patient Care Overview  Goal: Plan of Care/Patient Progress Review  Outcome: Improving  A&OX4, VSS on RA. Denies pain, n&V. States \" I feel pretty good today\". Tolerating clear liquid diet. Up SBA with walker. IVF at 100mL/hr. Possible discharge tomorrow. Continue to monitor.      "

## 2018-10-28 NOTE — PLAN OF CARE
Problem: Patient Care Overview  Goal: Plan of Care/Patient Progress Review  A&Ox4. VSS on RA. Denies nausea, pain, SOB. Up SBA. Voiding adequately. Tolerating clears. IVF infusing. Possible discharge 10/28/18.

## 2018-10-28 NOTE — PLAN OF CARE
Problem: Patient Care Overview  Goal: Plan of Care/Patient Progress Review  Outcome: Improving  Patient A&O. VSS on RA. Denies pain. No nausea my shift. Tolerating clear liquids. IVF infusing. Up SBA to bathroom. Probable discharge 10/28

## 2018-10-28 NOTE — DISCHARGE SUMMARY
[facility]    Discharge Summary  Hospitalist    Date of Admission:  10/25/2018  Date of Discharge:  10/28/2018  Discharging Provider: Allen Shoemaker MD  Date of Service (when I saw the patient): 10/28/18    Discharge Diagnoses   LA grade D reflux esophagitis and chronic gastritis  Acute epigastric abdominal pain due to the above  History of   Coronary artery disease  Hyperlipidemia  Hypertension    History of Present Illness    Per H&P:   Cristal Wynn is an 83-year-old pleasant woman with medical history significant for hypertension, hyperlipidemia, mild nonobstructing obstructing coronary artery disease, GERD and diverticulitis, who was brought to the ER by her daughter for evaluation of epigastric abdominal pain.  I discussed with the patient and her daughter and also reviewed her prior admission and discharge from a couple years ago, and also discussed with Dr. French, ED attending, for further information.       The patient has ongoing reflux symptoms for years.  She reports worsening symptoms for a week, including epigastric pain with radiation to the retrosternal area.  Initially it was mild and intermittent, but for the last 2 days, it is almost constant and moderate to severe.  Pain got worse last night and she called primary care provider who suggested to come to ER, but patient thought it was going to get better, but did not for several hours and so her daughter brought her here.  She also reports nausea and dry heaving.  Occasionally she experienced pain in her left shoulder as well.  Denies diarrhea, does have abdominal pain in the left periumbilical area.  Appetite is low for last week.  Denies weight loss.  She was taking Aleve, but it made her pain worse.   The patient had a bout of diverticulitis a couple of years ago.  She was recommended to follow up with primary care provider and to get a colonoscopy, which has not been done.     Hospital Course   Cristal Wynn is an  83-year-old pleasant woman with above-mentioned medical issues who was brought to the ER by her daughter for evaluation of epigastric abdominal pain and retrosternal pain.     LA grade D reflux esophagitis and chronic gastritis on EGD  Acute epigastric abdominal pain  EGD showed LA grade D reflux esophagitis and chronic gastritis.   She has improved with intravenous proton pump inhibitor therapy and sulcrafate.  She will discharge home on   Pantoprazole daily in the morning indefinitely  Omeprazole with dinner x 1 month  Sulcrafate qid x 1 month  No NSAID's  Follow up with GI     Knee DJD  She been using Aleve and was told to stop using any NSAID's.  She will have total knee arthroplasty in December.      CAD  Hyperlipidemia  Hypertension  Above chronic problems were stable    # Discharge Pain Plan:   - Patient currently has chronic knee pain and is not being prescribed pain medications on discharge.  She will stop NSAID's and use Tylenol as needed.  She has knee replacement scheduled in December    Allen Shoemaker MD    Significant Results and Procedures    see hospital course    Pending Results   These results will be followed up by primary care provider   Unresulted Labs Ordered in the Past 30 Days of this Admission     Date and Time Order Name Status Description    10/26/2018 0920 Surgical pathology exam In process           Code Status   Full Code       Primary Care Physician   Mora Bang    Constitutional: Awake, alert, cooperative, no apparent distress  GI: Normal bowel sounds, soft, non-distended, non-tender  Skin/Integumen: No rashes, no cyanosis, no edema  Other:     Discharge Disposition   Discharged to home  Condition at discharge: Stable    Consultations This Hospital Stay   SOCIAL WORK IP CONSULT  GASTROENTEROLOGY IP CONSULT  CARE TRANSITION RN/SW IP CONSULT    Time Spent on this Encounter   IAllen, personally saw the patient today and spent greater than 30 minutes  discharging this patient.    Discharge Orders     Reason for your hospital stay   Reflux esophagitis     Follow-up and recommended labs and tests    Follow up with primary care provider, Mora Bang, within 7 days for hospital follow- up.  No follow up labs or test are needed. GI clinic 1 month.     Activity   Your activity upon discharge: activity as tolerated     Discharge Instructions   No NSAID's- Aleve, Motrin, etc.     Diet   Follow this diet upon discharge: Orders Placed This Encounter     Mechanical/Dental Soft Diet       Discharge Medications   Current Discharge Medication List      START taking these medications    Details   pantoprazole (PROTONIX) 20 MG EC tablet Take by mouth 30-60 minutes before breakfast  Qty: 30 tablet, Refills: 1    Associated Diagnoses: Gastroesophageal reflux disease with esophagitis      sucralfate (CARAFATE) 1 GM/10ML suspension Take 10 mLs (1 g) by mouth 4 times daily (before meals and nightly)  Qty: 1200 mL, Refills: 0    Associated Diagnoses: Chronic gastritis without bleeding, unspecified gastritis type         CONTINUE these medications which have CHANGED    Details   omeprazole (PRILOSEC) 20 MG CR capsule Take 1 capsule (20 mg) by mouth daily (with dinner)    Associated Diagnoses: Chronic gastritis without bleeding, unspecified gastritis type         CONTINUE these medications which have NOT CHANGED    Details   Acetaminophen (TYLENOL PO) Take 325-650 mg by mouth every 6 hours as needed for mild pain or fever      losartan-hydrochlorothiazide (HYZAAR) 100-12.5 MG per tablet Take 1 tablet by mouth every morning      METOPROLOL SUCCINATE ER PO Take 25 mg by mouth At Bedtime       order for DME Equipment being ordered: Walker Wheels () and Walker ()  Treatment Diagnosis: Right total knee replacement  Qty: 1 each, Refills: 0    Associated Diagnoses: Arthritis of knee, right           Allergies   Allergies   Allergen Reactions     Asa [Aspirin]      Excedrin Back &  [Acetaminophen-Aspirin Buffered]      Abdominal pain     Morphine      Data   Most Recent 3 CBC's:  Recent Labs   Lab Test  10/25/18   2339  09/17/17   0632  09/16/17   0634  09/15/17   0645  09/14/17   1358  12/24/16   0600  12/23/16   1330   WBC  12.8*   --    --    --    --   12.2*  20.3*   HGB  14.5   --   10.7*  11.0*  12.2  12.0  14.4   MCV  93   --    --    --    --   92  92   PLT  343  235   --    --   310  240  319      Most Recent 3 BMP's:  Recent Labs   Lab Test  10/25/18   2339 09/17/17   0632  09/16/17   0634  09/15/17   0645  09/14/17   1358  12/24/16   0600  12/23/16   1415   NA  139   --    --    --    --   138  137   POTASSIUM  4.2   --    --    --   4.3  3.9  3.9   CHLORIDE  105   --    --    --    --   106  103   CO2  25   --    --    --    --   25  26   BUN  22   --    --    --    --   13  21   CR  0.83  0.76   --    --   0.83  0.74  0.71   ANIONGAP  9   --    --    --    --   7  8   DINH  9.7   --    --    --    --   8.1*  9.2   GLC  98   --   111*  124*   --   104*  108*     Most Recent 2 LFT's:  Recent Labs   Lab Test  10/25/18   2339  12/24/16   0600   AST  35  16   ALT  23  19   ALKPHOS  76  75   BILITOTAL  0.5  0.5     Most Recent INR's and Anticoagulation Dosing History:  Anticoagulation Dose History     Recent Dosing and Labs Latest Ref Rng & Units 2/17/2010 2/18/2010 2/19/2010 2/20/2010    INR 0.86 - 1.14 0.96 1.05 1.22(H) 1.51(H)        Most Recent 3 Troponin's:  Recent Labs   Lab Test  10/25/18   2339  12/24/16   0600  12/24/16   0015   TROPI  <0.015  <0.015  The 99th percentile for upper reference range is 0.045 ug/L.  Troponin values in   the range of 0.045 - 0.120 ug/L may be associated with risks of adverse   clinical events.    <0.015  The 99th percentile for upper reference range is 0.045 ug/L.  Troponin values in   the range of 0.045 - 0.120 ug/L may be associated with risks of adverse   clinical events.       Most Recent Cholesterol Panel:  Recent Labs   Lab Test  11/13/14    1145   CHOL  242*   LDL  141   HDL  69   TRIG  158*     Most Recent 6 Bacteria Isolates From Any Culture (See EPIC Reports for Culture Details):No lab results found.  Most Recent TSH, T4 and A1c Labs:No lab results found.  Results for orders placed or performed during the hospital encounter of 10/25/18   CT Chest (PE) Abdomen Pelvis w Contrast    Narrative    CT CHEST PE ABDOMEN PELVIS W CONTRAST  10/26/2018 1:09 AM      HISTORY: Epigastric and left chest pain, elevated D-dimer, elevated  WBC.    TECHNIQUE: CT chest, abdomen and pelvis with intravenous contrast.  Radiation dose for this scan was reduced using automated exposure  control, adjustment of the mA and/or kV according to patient size, or  iterative reconstruction technique. 76 mL Isovue-370.      COMPARISON: 12/23/2016.    FINDINGS:  Chest: Evaluation of the pulmonary arterial system shows no evidence  of embolus. The thoracic aorta is calcified. No aortic aneurysm or  dissection. There are coronary artery atherosclerotic calcifications.  The heart size is normal. There is suggestion of left ventricular  hypertrophy. There is no mediastinal, hilar or axillary lymph node  enlargement. There are small bilateral pleural effusions. Dependent  atelectasis bilaterally.    Abdomen: The gallbladder is absent. There is a cyst in the left lobe  of the liver. The spleen, pancreas and adrenal glands are normal in  appearance. There are several right renal cysts measuring up to 6.2  cm. Two small left renal cysts. No hydronephrosis. There is no  abdominal lymph node enlargement.    Pelvis: There is a single mildly enlarged lymph node in the right  lateral pelvis along the external iliac chain measuring 1.4 x 1.2 cm.  No other pelvic lymph node enlargement. A right hip arthroplasty  causes streak artifact through the pelvis. There are colonic  diverticula without acute diverticulitis. There are areas of  nodularity in the fat of the abdomen and abutting segments of  the  transverse colon raising the question of peritoneal metastatic  disease. There is wall thickening and narrowing of the distal gastric  body. There is a small amount of ascites. No free intraperitoneal gas.  There is degenerative disease in the spine.      Impression    IMPRESSION:  1. There is no pulmonary embolus, aortic aneurysm or dissection.  2. Coronary artery atherosclerotic calcifications.  3. Small bilateral pleural effusions.  4. Small amount of ascites. Soft tissue nodularity in the fat of the  abdomen and along the surface of the transverse colon may be  peritoneal metastatic disease.  5. Wall thickening and narrowing of the distal stomach. Though this  could be due to peristaltic wave, malignancy or inflammation could  also cause this appearance.  6. Colonic diverticula without acute diverticulitis.    KATARINA NOLAN MD

## 2018-11-01 PROBLEM — R79.89 TROPONIN LEVEL ELEVATED: Status: ACTIVE | Noted: 2018-01-01

## 2018-11-01 NOTE — IP AVS SNAPSHOT
MRN:[id number]                      After Visit Summary   11/1/2018    Cristal Wynn    MRN: [id number]           Thank you!     Thank you for choosing [facility] for your care. Our goal is always to provide you with excellent care. Hearing back from our patients is one way we can continue to improve our services. Please take a few minutes to complete the written survey that you may receive in the mail after you visit with us. Thank you!        Patient Information     Date Of Birth          1935        Designated Caregiver       Most Recent Value    Caregiver    Will someone help with your care after discharge? yes    Name of designated caregiver Leticia    Phone number of caregiver [phone]    Caregiver address [place]      About your hospital stay     You were admitted on:  November 1, 2018 You last received care in the:  [address] [facility]    You were discharged on:  November 11, 2018        Reason for your hospital stay       You were admitted to the hospital secondary to gastroparesis and has undergone stent placement                  Who to Call     For medical emergencies, please call 911.  For non-urgent questions about your medical care, please call your primary care provider or clinic, [phone]  For questions related to your surgery, please call your surgery clinic        Attending Provider     Provider Specialty    Katlin Magana MD Emergency Medicine    Siria Hernandez MD Internal Medicine    Nigel Wynne DO Internal Medicine    Yaima Mccartney MD Internal Medicine       Primary Care Provider Office Phone # Fax #    Mora Bang [phone]      After Care Instructions     Activity       Your activity upon discharge: activity as tolerated and no driving for today            Diet       Follow this diet upon discharge: Orders Placed This Encounter      Snacks/Supplements Adult: Boost Plus; Between Meals       "Advance Diet as Tolerated: Low Fiber              Discharge Instructions       You will be taking Reglan three times a day to help with gastroparesis   You can continue to have full liquid or Low residue diet , continue with small but frequent meals .  Please keep your appointment with gastroenterology for repeated EGD and biopsy                  Follow-up Appointments     Follow-up and recommended labs and tests       Follow up with primary care provider, Mora Bang, within 7 days to evaluate treatment change and for hospital follow- up.  The following labs/tests are recommended: BMP and CBC.  Please follow up with  in 2 weeks                  Pending Results     Date and Time Order Name Status Description    11/11/2018 0000  In process             Statement of Approval     Ordered          11/11/18 1233  I have reviewed and agree with all the recommendations and orders detailed in this document.  EFFECTIVE NOW     Approved and electronically signed by:  Yaima Mccartney MD             Admission Information     Date & Time Provider Department Dept. Phone    11/1/2018 Yaima Mccartney MD [place] [address] [facility] [phone]      Your Vitals Were     Blood Pressure Pulse Temperature Respirations Height Weight    170/89 (BP Location: Left arm) 94 97.8  F (36.6  C) (Oral) 16 1.651 m (5' 5\") 72.6 kg (160 lb)    Pulse Oximetry BMI (Body Mass Index)                90% 26.63 kg/m2          [employer] Information     [employer] lets you send messages to your doctor, view your test results, renew your prescriptions, schedule appointments and more. To sign up, go to [email or web address] . Click on \"Log in\" on the left side of the screen, which will take you to the Welcome page. Then click on \"Sign up Now\" on the right side of the page.     You will be asked to enter the access code listed below, as well as some personal information. Please follow the directions to create your username and " password.     Your access code is: [email or web address]  Expires: 2019 10:09 AM     Your access code will  in 90 days. If you need help or a new code, please call your [facility] clinic or [phone].        Care EveryWhere ID     This is your Care EveryWhere ID. This could be used by other organizations to access your [facility] medical records  [id number]        Equal Access to Services     RUDDY RAE : Hadii padma norton hadasho Soomaali, waaxda luqadaha, qaybta kaalmada adeegyada, ivette christiansonin haysilvertrish de la rosamilasiria ro . So [facility] [phone].    ATENCIÓN: Si habla español, tiene a birch disposición servicios gratuitos de asistencia lingüística. Llame al [phone].    We comply with applicable federal civil rights laws and [place] laws. We do not discriminate on the basis of race, color, national origin, age, disability, sex, sexual orientation, or gender identity.               Review of your medicines      START taking        Dose / Directions    metoclopramide 10 MG tablet   Commonly known as:  REGLAN   Used for:  Gastroparesis        Dose:  10 mg   Take 1 tablet (10 mg) by mouth 3 times daily (before meals)   Quantity:  90 tablet   Refills:  0         CONTINUE these medicines which may have CHANGED, or have new prescriptions. If we are uncertain of the size of tablets/capsules you have at home, strength may be listed as something that might have changed.        Dose / Directions    pantoprazole 40 MG EC tablet   Commonly known as:  PROTONIX   This may have changed:    - medication strength  - how much to take  - how to take this  - when to take this  - additional instructions   Used for:  Gastroparesis        Dose:  40 mg   Start taking on:  2018   Take 1 tablet (40 mg) by mouth every morning (before breakfast)   Quantity:  30 tablet   Refills:  0         CONTINUE these medicines which have NOT CHANGED        Dose / Directions    losartan-hydrochlorothiazide 100-12.5 MG per tablet   Commonly known as:   HYZAAR        Dose:  1 tablet   Take 1 tablet by mouth every morning   Refills:  0       METOPROLOL SUCCINATE ER PO        Dose:  25 mg   Take 25 mg by mouth At Bedtime   Refills:  0       order for DME   Used for:  Arthritis of knee, right        Equipment being ordered: Walker Wheels () and Walker () Treatment Diagnosis: Right total knee replacement   Quantity:  1 each   Refills:  0       TYLENOL PO        Dose:  325-650 mg   Take 325-650 mg by mouth every 6 hours as needed for mild pain or fever   Refills:  0         STOP taking     sucralfate 1 GM/10ML suspension   Commonly known as:  CARAFATE                Where to get your medicines      These medications were sent to [facility] - [address]  [address], [place] [address]     Phone:  [phone]     metoclopramide 10 MG tablet    pantoprazole 40 MG EC tablet                Protect others around you: Learn how to safely use, store and throw away your medicines at www.disposemymeds.org.             Medication List: This is a list of all your medications and when to take them. Check marks below indicate your daily home schedule. Keep this list as a reference.      Medications           Morning Afternoon Evening Bedtime As Needed    losartan-hydrochlorothiazide 100-12.5 MG per tablet   Commonly known as:  HYZAAR   Take 1 tablet by mouth every morning                                   metoclopramide 10 MG tablet   Commonly known as:  REGLAN   Take 1 tablet (10 mg) by mouth 3 times daily (before meals)   Last time this was given:  10 mg on 11/11/2018 12:06 PM                                         METOPROLOL SUCCINATE ER PO   Take 25 mg by mouth At Bedtime   Last time this was given:  25 mg on 11/10/2018  8:59 PM                                   order for DME   Equipment being ordered: Walker Wheels () and Walker () Treatment Diagnosis: Right total knee replacement                                 pantoprazole 40 MG EC tablet   Commonly known as:  PROTONIX   Take 1 tablet (40 mg) by mouth every morning (before breakfast)   Start taking on:  11/12/2018   Last time this was given:  40 mg on 11/11/2018  6:31 AM                                   TYLENOL PO   Take 325-650 mg by mouth every 6 hours as needed for mild pain or fever

## 2018-11-01 NOTE — ED PROVIDER NOTES
"  History     Chief Complaint:  Abdominal Pain    HPI   Cristal Wynn is a 83 year old female with a history of CAD, GERD, Hyperlipidemia, and Hypertension who presents with abdominal pain. The patient reports that she presented to the emergency department on 10/25 for evaluation of an onset of intermittent epigastric abdominal pain with nausea and vomiting. She was admitted at that time, had an EGD as below, and was discharged on 10/28 on pantoprazole and sucralfate. After her discharge, she continued to have worsening vomiting and pain, so she presented to Dr. Cordova's office, GI, who referred her to the emergency department for further evaluation. Here in the ED, she notes 6-7 episodes of vomiting/day and that her last episode of vomiting was 40 minutes prior to arrival. She states the symptoms that are brining her in today are generally in the similar location to what brought her in a week ago but that today they all significantly worsened. She also endorses a generalized lower abdominal fullness/bloating sensation that is constant but improved with vomiting.  She has and epigastric and lower central chest burning sensation that is slightly worse after eating but has no alleviating factors.   New today is some pain in her neck (bilateral) that she personally attributed to the vomiting. She is concerned for dehydration as she notes she's unable to keep anything down and has not become dizzy.  She notes she's had small bowel movements daily without diarrhea.  She also notes intermittent dysuria \"for a while.\"  The patient has also become increasingly dehydrated with some dizziness and a decreased appetite. Despite a decreased PO intake, she has had large volumes of emesis. Her last bowel movement was this morning, and was small.   Of note her granddaughter  today.    LA grade D reflux esophagitis and chronic gastritis on EGD   Acute epigastric abdominal pain  EGD showed LA grade D reflux esophagitis and " "chronic gastritis.   She has improved with intravenous proton pump inhibitor therapy and sulcrafate.  She will discharge home on   Pantoprazole daily in the morning indefinitely  Omeprazole with dinner x 1 month  Sulcrafate qid x 1 month  No NSAID's  Follow up with GI     Allergies:  Asprin  Excedrin Back  Morphine    Medications:    losartan-hydrochlorothiazide  Metoprolol  omeprazole  protonix  Carafate    Past Medical History:    Cataracts, bilateral  Hypertension  Hyperlipidemia  Cerebral hemorrhage  Coronary Artery Disease  Diverticulitis  Arthritis  Gastritis  Disorder of bone and cartilage, unspecified  GERD  Osteoarthrosis  Right knee pain  Symptomatic cholelithiasis  Vitamin B deficiency    Past Surgical History:    Arthroplasty knee  Cosmetic surgery  EGD  GYN Surgery  Head & Neck surgery  Cholecystectomy  Hip replacement  Phacoemulsification clear cornea      Family History:    Prostate cancer  Alcohol/Drug  Heart failure  Heart Disease  Lymphoma  Helicobacter Pylori  Obesity  CAD      Social History:  Marital Status:   Smoker: no  Smokeless: no  Alcohol: yes    Review of Systems   Constitutional: Positive for appetite change.   Gastrointestinal: Positive for abdominal pain, nausea and vomiting. Negative for diarrhea.   Genitourinary: Positive for dysuria.   Musculoskeletal: Positive for neck pain.   All other systems reviewed and are negative.    Physical Exam     Patient Vitals for the past 24 hrs:   BP Temp Temp src Pulse Resp SpO2 Height Weight   11/01/18 1945 135/74 - - - - - - -   11/01/18 1516 140/83 97.9  F (36.6  C) Oral 100 18 98 % 1.651 m (5' 5\") 68.9 kg (152 lb)     Physical Exam  General/Appearance: appears stated age, well-groomed, appears uncomfortable  Eyes: EOMI, no scleral injection, no icterus  ENT: MMM  Neck: supple, nl ROM, no stiffness  Cardiovascular: RRR, nl S1S2, no m/r/g, 2+ pulses in all 4 extremities, cap refill <2sec  Respiratory: CTAB, good air movement throughout, no " wheezes/rhonchi/rales, no increased WOB, no retractions  Back: no lesions  GI: abd soft, non-distended, epigastric ttp,  no HSM, no rebound, no guarding, nl BS  MSK: AVALOS, good tone, no bony abnormality  Skin: warm and well-perfused, no rash, no edema, no ecchymosis, nl turgor  Neuro: GCS 15, alert and oriented, no gross focal neuro deficits  Psych: interacts appropriately  Heme: no petechia, no purpura, no active bleeding    Emergency Department Course   ECG:  Indication: Epigastric Pain & Shortness of Breath  Time: 1521  Vent. Rate 101 bpm. NE interval 166. QRS duration 80. QT/QTc 348/451. P-R-T axis 43 5 19.  Sinus tachycardia. Inferior infarct, age undetermined. Anterolateral infarct, age undetermined. Abnormal ECG. No significant change compared to EKG dated 10/25/18. Read time: 1737.    Time: 1817  Vent. Rate 93 bpm. NE interval 180. QRS duration 84. QT/QTc 384/477. P-R-T axis 37 -17 6.  Normal sinus rhythm. Inferior infarct, age undetermined. Anterolateral infarct, age undetermined. Abnormal ECG. Read time: 1900.    Imaging:  Radiographic findings were communicated with the patient who voiced understanding of the findings.  XR Abdomen 2 views, flat and upright:   No evidence for bowel obstruction or free air, as per radiology.     XR Chest 2 views:   Small bilateral pleural effusions. Minimal airspace opacity at the left lung base may represent atelectasis or pneumonia, as per radiology.     Laboratory:  CBC: WBC: 12.2 (H), HGB: 14.7, PLT: 380  CMP: Potassium 3.0 (L), Creatinine 1.11 (H), GFR 47 (L), ALT 61 (H), AST 46 (H), o/w WNL  Lipase: 177    1728 Troponin: 0.127 (HH)    INR: 1.01  PTT: 37    BNP: 719    UA with Microscopic: pending on admission    Interventions:  1759 NS 1L IV  1839 Protonix 40 mg IV  1842 Zofran, 4 mg, IV injection  1851 Hyoscyamine 250 mcg PO  1852 Carafate 1 g PO  1855 Potassium chloride 10 mEq IV  1920 Heparin 3500 units bolus IV   Heparin 750 units/hr infusion IV    Emergency  Department Course:  Nursing notes and vitals reviewed. (1730) I performed an exam of the patient as documented above.     IV inserted. Medicine administered as documented above. Blood drawn. This was sent to the lab for further testing, results above.    The patient provided a urine sample here in the emergency department. This was sent for laboratory testing, findings above.     The patient was sent for an Abdomen XR & Chest XR while in the emergency department, findings above.     EKG obtained in the ED, see results above.     (1827) I rechecked the patient and discussed the results of her workup thus far.     (1908)  I consulted with Dr. Hernandez of the hospitalist services. They are in agreement to accept the patient for admission.    Findings and plan explained to the Patient who consents to admission. Discussed the patient with Dr. Hernandez, who will admit the patient to a [facility] [place] for further monitoring, evaluation, and treatment.    Impression & Plan      Medical Decision Making:  Cristal Wynn is a 83 year old female With recent admission for gastritis and esophagitis presents with increased epigastric and chest pain today.  She also presents with significant nausea and vomiting.  Although I am sure gastritis and esophagitis are contributing a large degree to her current symptoms I was concerned for possible additional etiology for the worsened symptoms.  In a woman this age I was concerned for atypical presentation of ACS.  Troponin drawn 5-6 days ago was negative however today is elevated.  Although this may be secondary to demand ischemia from dehydration the patient appears to stable for this and therefore I feel we need to treated cautiously and as a non-ST elevation MI.  She has been started on heparin and this is been explained to her.  She just recently had a workup for PE with an elevated d-dimer but negative CT chest/abdomen/pelvis.  I do not think this needs to be redone today.  LFTs are very mildly  elevated but otherwise there is no marketed lab abnormalities not otherwise mentioned above.  She will be coming into the hospitalist for further management.    Diagnosis:    ICD-10-CM    1. Gastritis without bleeding, unspecified chronicity, unspecified gastritis type K29.70 INR     Partial thromboplastin time     Nt probnp inpatient     Nt probnp inpatient     CANCELED: BNP   2. Generalized abdominal pain R10.84    3. Elevated troponin R74.8      Disposition:  Admitted to Dr. Mary Hong Disclosure:  I, Alejandro Gallegos, am serving as a scribe on 11/1/2018 at 5:32 PM to personally document services performed by Katlin Magana MD based on my observations and the provider's statements to me.     Alejandro Gallegos  11/1/2018    EMERGENCY DEPARTMENT       Katlin Magana MD  11/01/18 2052

## 2018-11-01 NOTE — IP AVS SNAPSHOT
"    [place] [address] [facility]: [phone]                                              INTERAGENCY TRANSFER FORM - LAB / IMAGING / EKG / EMG RESULTS   2018                    Hospital Admission Date: 2018  NIKITA KEBEDE   : 1935  Sex: Female        Attending Provider: Yaima Mccartney MD     Allergies:  Asa [Aspirin], Excedrin Back & [Acetaminophen-aspirin Buffered], Morphine    Infection:  None   Service:  HOSPITALIST    Ht:  1.651 m (5' 5\")   Wt:  72.6 kg (160 lb)   Admission Wt:  68.9 kg (152 lb)    BMI:  26.63 kg/m 2   BSA:  1.82 m 2            Patient PCP Information     Provider PCP Type    Mora Bang MD General         Lab Results - 3 Days      Basic metabolic panel [[id number]] (Abnormal)  Resulted: 18 0627, Result status: Final result    Ordering provider: Yaima Mccartney MD  18 0000 Resulting lab: [facility]    Specimen Information    Type Source Collected On   Blood  18 0550          Components       Value Reference Range Flag Lab   Sodium 138 133 - 144 mmol/L  FrStHsLb   Potassium 3.6 3.4 - 5.3 mmol/L  FrStHsLb   Chloride 103 94 - 109 mmol/L  FrStHsLb   Carbon Dioxide 27 20 - 32 mmol/L  FrStHsLb   Anion Gap 8 3 - 14 mmol/L  FrStHsLb   Glucose 86 70 - 99 mg/dL  FrStHsLb   Urea Nitrogen 9 7 - 30 mg/dL  FrStHsLb   Creatinine 0.77 0.52 - 1.04 mg/dL  FrStHsLb   GFR Estimate 72 >60 mL/min/1.7m2  FrStHsLb   Comment:  Non  GFR Calc   GFR Estimate If Black 87 >60 mL/min/1.7m2  FrStHsLb   Comment:  African American GFR Calc   Calcium 8.3 8.5 - 10.1 mg/dL L FrStHsLb             [[id number]]  Resulted: 18 0605, Result status: In process    Ordering provider: Yaima Mccartney MD  18 0000 Resulting lab: [facility]    Specimen Information    Type Source Collected On   Blood  18 0550            Potassium [[id number]]  Resulted: 11/10/18 1810, Result status: Final result    Ordering provider: Yaima Mccartney MD  " 11/10/18 1200 Resulting lab: [facility]    Specimen Information    Type Source Collected On   Blood  11/10/18 1753          Components       Value Reference Range Flag Lab   Potassium 3.6 3.4 - 5.3 mmol/L  FrStHsLb            Nt probnp inpatient [[id number]]  Resulted: 11/10/18 0810, Result status: Final result    Ordering provider: London Cordova MD  11/10/18 0636 Resulting lab: [facility]    Specimen Information    Type Source Collected On     11/10/18 0636          Components       Value Reference Range Flag Lab   N-Terminal Pro BNP Inpatient 239 0 - 1800 pg/mL  FrStHsLb   Comment:            Reference range shown and results flagged as abnormal are suggested inpatient   cut points for confirming diagnosis if CHF in an acute setting. Establishing a   baseline value for each individual patient is useful for follow-up. An   inpatient or emergency department NT-proPBNP <300 pg/mL effectively rules out   acute CHF, with 99% negative predictive value.  The outpatient non-acute reference range for ruling out CHF is:   0-125 pg/mL (age 18 to less than 75)   0-450 pg/mL (age 75 yrs and older)              Comprehensive metabolic panel [[id number]] (Abnormal)  Resulted: 11/10/18 0704, Result status: Final result    Ordering provider: London Cordova MD  11/10/18 0000 Resulting lab: [facility]    Specimen Information    Type Source Collected On   Blood  11/10/18 0636          Components       Value Reference Range Flag Lab   Sodium 138 133 - 144 mmol/L  FrStHsLb   Potassium 3.8 3.4 - 5.3 mmol/L  FrStHsLb   Chloride 105 94 - 109 mmol/L  FrStHsLb   Carbon Dioxide 25 20 - 32 mmol/L  FrStHsLb   Anion Gap 8 3 - 14 mmol/L  FrStHsLb   Glucose 96 70 - 99 mg/dL  FrStHsLb   Urea Nitrogen 10 7 - 30 mg/dL  FrStHsLb   Creatinine 0.68 0.52 - 1.04 mg/dL  FrStHsLb   GFR Estimate 83 >60 mL/min/1.7m2  FrStHsLb   Comment:  Non  GFR Calc   GFR Estimate If Black  >90 >60 mL/min/1.7m2  FrStHsLb   Comment:  African American GFR Calc   Calcium 8.4 8.5 - 10.1 mg/dL L FrStHsLb   Bilirubin Total 0.4 0.2 - 1.3 mg/dL  FrStHsLb   Albumin 2.5 3.4 - 5.0 g/dL L FrStHsLb   Protein Total 5.8 6.8 - 8.8 g/dL L FrStHsLb   Alkaline Phosphatase 72 40 - 150 U/L  FrStHsLb   ALT 62 0 - 50 U/L H FrStHsLb   AST 51 0 - 45 U/L H FrStHsLb            CBC (AM Draw) [[id number]]  Resulted: 11/10/18 0649, Result status: Final result    Ordering provider: London Cordova MD  11/10/18 0000 Resulting lab: [facility]    Specimen Information    Type Source Collected On   Blood  11/10/18 0636          Components       Value Reference Range Flag Lab   WBC 8.9 4.0 - 11.0 10e9/L  FrStHsLb   RBC Count 4.03 3.8 - 5.2 10e12/L  FrStHsLb   Hemoglobin 12.3 11.7 - 15.7 g/dL  FrStHsLb   Hematocrit 37.9 35.0 - 47.0 %  FrStHsLb   MCV 94 78 - 100 fl  FrStHsLb   MCH 30.5 26.5 - 33.0 pg  FrStHsLb   MCHC 32.5 31.5 - 36.5 g/dL  FrStHsLb   RDW 12.8 10.0 - 15.0 %  FrStHsLb   Platelet Count 325 150 - 450 10e9/L  FrStHsLb            T3 Free [[id number]] (Abnormal)  Resulted: 11/09/18 1132, Result status: Final result    Ordering provider: London Cordova MD  11/09/18 0628 Resulting lab: [facility]    Specimen Information    Type Source Collected On     11/09/18 0628          Components       Value Reference Range Flag Lab   Free T3 2.1 2.3 - 4.2 pg/mL L 51            T4 free [[id number]]  Resulted: 11/09/18 0755, Result status: Final result    Ordering provider: London Cordova MD  11/09/18 0628 Resulting lab: [facility]    Specimen Information    Type Source Collected On     11/09/18 0628          Components       Value Reference Range Flag Lab   T4 Free 1.21 0.76 - 1.46 ng/dL  FrStHsLb            TSH [[id number]] (Abnormal)  Resulted: 11/09/18 0719, Result status: Final result    Ordering provider: London Cordova MD  11/09/18 0000 Resulting  lab: [facility]    Specimen Information    Type Source Collected On   Blood  11/09/18 0628          Components       Value Reference Range Flag Lab   TSH 0.35 0.40 - 4.00 mU/L L FrStHsLb            Comprehensive metabolic panel [[id number]] (Abnormal)  Resulted: 11/09/18 0713, Result status: Final result    Ordering provider: London Cordova MD  11/09/18 0000 Resulting lab: [facility]    Specimen Information    Type Source Collected On   Blood  11/09/18 0628          Components       Value Reference Range Flag Lab   Sodium 141 133 - 144 mmol/L  FrStHsLb   Potassium 3.7 3.4 - 5.3 mmol/L  FrStHsLb   Chloride 106 94 - 109 mmol/L  FrStHsLb   Carbon Dioxide 30 20 - 32 mmol/L  FrStHsLb   Anion Gap 5 3 - 14 mmol/L  FrStHsLb   Glucose 103 70 - 99 mg/dL H FrStHsLb   Urea Nitrogen 15 7 - 30 mg/dL  FrStHsLb   Creatinine 0.73 0.52 - 1.04 mg/dL  FrStHsLb   GFR Estimate 77 >60 mL/min/1.7m2  FrStHsLb   Comment:  Non  GFR Calc   GFR Estimate If Black >90 >60 mL/min/1.7m2  FrStHsLb   Comment:  African American GFR Calc   Calcium 8.5 8.5 - 10.1 mg/dL  FrStHsLb   Bilirubin Total 0.4 0.2 - 1.3 mg/dL  FrStHsLb   Albumin 2.6 3.4 - 5.0 g/dL L FrStHsLb   Protein Total 5.7 6.8 - 8.8 g/dL L FrStHsLb   Alkaline Phosphatase 67 40 - 150 U/L  FrStHsLb   ALT 53 0 - 50 U/L H FrStHsLb   AST 44 0 - 45 U/L  FrStHsLb            CBC (AM Draw) [[id number]]  Resulted: 11/09/18 0652, Result status: Final result    Ordering provider: London Cordova MD  11/09/18 0000 Resulting lab: [facility]    Specimen Information    Type Source Collected On   Blood  11/09/18 0628          Components       Value Reference Range Flag Lab   WBC 8.8 4.0 - 11.0 10e9/L  FrStHsLb   RBC Count 3.84 3.8 - 5.2 10e12/L  FrStHsLb   Hemoglobin 11.8 11.7 - 15.7 g/dL  FrStHsLb   Hematocrit 36.6 35.0 - 47.0 %  FrStHsLb   MCV 95 78 - 100 fl  FrStHsLb   MCH 30.7 26.5 - 33.0 pg  FrStHsLb   MCHC 32.2 31.5 - 36.5  "g/dL  FrStHsLb   RDW 13.4 10.0 - 15.0 %  FrStHsLb   Platelet Count 339 150 - 450 10e9/L  FrStHsLb            Magnesium [[id number]]  Resulted: 11/08/18 0710, Result status: Final result    Ordering provider: Yaima Mccartney MD  11/08/18 0000 Resulting lab: [facility]    Specimen Information    Type Source Collected On   Blood  11/08/18 0634          Components       Value Reference Range Flag Lab   Magnesium 2.1 1.6 - 2.3 mg/dL  FrStHsLb            Basic metabolic panel [[id number]] (Abnormal)  Resulted: 11/08/18 0710, Result status: Final result    Ordering provider: Yaima Mccartney MD  11/08/18 0000 Resulting lab: [facility]    Specimen Information    Type Source Collected On   Blood  11/08/18 0634          Components       Value Reference Range Flag Lab   Sodium 144 133 - 144 mmol/L  FrStHsLb   Potassium 3.6 3.4 - 5.3 mmol/L  FrStHsLb   Chloride 105 94 - 109 mmol/L  FrStHsLb   Carbon Dioxide 35 20 - 32 mmol/L H FrStHsLb   Anion Gap 4 3 - 14 mmol/L  FrStHsLb   Glucose 114 70 - 99 mg/dL H FrStHsLb   Urea Nitrogen 14 7 - 30 mg/dL  FrStHsLb   Creatinine 0.81 0.52 - 1.04 mg/dL  FrStHsLb   GFR Estimate 67 >60 mL/min/1.7m2  FrStHsLb   Comment:  Non  GFR Calc   GFR Estimate If Black 81 >60 mL/min/1.7m2  FrStHsLb   Comment:  African American GFR Calc   Calcium 8.8 8.5 - 10.1 mg/dL  FrStHsLb            Testing Performed By     Lab - Abbreviation Name Director Address Valid Date Range    14 - FrStHsLb [facility] Unknown [address] [place] [address] 05/08/15 1057 - Present    45 - ABN306 [facility] Unknown Unknown 01/28/02 0000 - Present    51 - Unknown [facility] Unknown [address] [facility] [address] 12/31/14 1010 - Present            Unresulted Labs (24h ago through future)    Start       Ordered    Unscheduled  Potassium  (Potassium Replacement - \"Standard\" - For K levels less than 3.4 mmol/L - UU,UR,UA,RH,SH,PH,WY )  " "CONDITIONAL (SPECIFY),   Routine     Comments:  Obtain Potassium Level for these conditions:  *IF no potassium result within 24 hours before initiation of order set, draw potassium level with next lab collect.    *2 HOURS AFTER last IV potassium replacement dose and 4 hours after an oral replacement dose.  *Next morning after potassium dose.     Repeat Potassium Replacement if necessary.    11/02/18 1128    Unscheduled  Magnesium  (Magnesium Replacement - Adult - \"High\" - Replacement for all levels less than or equal to 2 mg/dL)  CONDITIONAL (SPECIFY),   Routine     Comments:  Obtain Magnesium Level for these conditions:  *IF no magnesium result within 24 hrs before initiation of order set, draw magnesium level with next lab collect.    *2 HOURS AFTER last magnesium replacement dose when magnesium replacement given for level less than 1.6  *Next morning after magnesium dose.     Repeat Magnesium Replacement if necessary.    11/04/18 1623         Imaging Results - 3 Days      CT Abdomen Pelvis w Contrast [[id number]]  Resulted: 11/10/18 0830, Result status: Final result    Ordering provider: London Cordova MD  11/09/18 1701 Resulted by: Marbella Beltran MD    Performed: 11/10/18 0719 - 11/10/18 0726 Resulting lab: RADIOLOGY RESULTS    Narrative:       CT ABDOMEN AND PELVIS WITH CONTRAST   11/10/2018 7:26 AM     HISTORY: Abdominal pain, nausea, vomiting.     TECHNIQUE:   81 mL Isovue-370. Radiation dose for this scan was  reduced using automated exposure control, adjustment of the mA and/or  kV according to patient size, or iterative reconstruction technique.    COMPARISON: 10/26/2018    FINDINGS:   There are moderate bilateral pleural effusions, increased  in size since the previous examination. Underlying  consolidation/atelectasis in both lung bases. The heart size is  normal.    There is a 3.2 cm cyst in the left hepatic lobe, unchanged. The liver  is otherwise unremarkable. The gallbladder is " absent. The spleen,  pancreas, and adrenal glands are unremarkable. There are bilateral  renal cysts. A gastric stent has been placed. There is moderate  gastric wall thickening. There is no evidence of bowel obstruction.  Colonic diverticulosis is again seen. There is contrast in the colon.  Appendix not identified. Small amount of abdominal ascites. Small to  moderate amount of pelvic ascites, increased since the previous  examination. There is no free intraperitoneal air. Again noted are  areas of nodularity in the mesenteric fat. A right hip arthroplasty  causes beam hardening artifact in the pelvis. There is a mildly  enlarged right external iliac lymph node, unchanged.         Impression:       IMPRESSION:   1. Moderate bilateral pleural effusions, increased since the previous  examination.  2. Interval placement of a gastric stent. There is persistent gastric  wall thickening which could be due to inflammation but malignancy  could also cause this appearance.  3. Small amount of abdominal and moderate amount of pelvic ascites.  4. Soft tissue nodularity in the mesenteric fat worrisome for  peritoneal metastatic disease.  5. Colonic diverticulosis. No evidence of acute diverticulitis.    MARGRET SAUCEDA MD      XR Chest Port 1 View [[id number]]  Resulted: 11/09/18 1842, Result status: Final result    Ordering provider: Yaima Mccartney MD  11/09/18 1441 Resulted by: Elif Pablo MD    Performed: 11/09/18 1735 - 11/09/18 1740 Resulting lab: RADIOLOGY RESULTS    Narrative:       CHEST ONE VIEW UPRIGHT 11/9/2018 5:40 PM     HISTORY: Hypoxia, concern for atelectasis versus fluid overload.     COMPARISON: 11/1/2018      Impression:       IMPRESSION: Marked bibasilar effusions and/or infiltrates, question  congestive heart failure.    ELIF PABLO MD      XR Feeding Tube Placement [[id number]]  Resulted: 11/08/18 1622, Result status: Final result    Ordering provider: London Cordova MD  11/08/18 1231  Resulted by: Andrés Gar PA-C    Performed: 18 1358 - 18 1450 Resulting lab: RADIOLOGY RESULTS    Narrative:       FEEDING TUBE PLACEMENT   2018 2:50 PM    HISTORY: Gastroparesis with vomiting.    COMPARISON: None    FINDINGS: 9.3 minutes of fluoroscopy were utilized for placement of a  feeding tube.  At the final position, the feeding tube tip was the  stomach. Numerous attempts were made to advance this tube beyond the  stomach without success. Dr. Moya was also consulted. He attempted in  addition without success. We left the tube in the stomach and we will  evaluate with x-ray tomorrow to see if the tube advances on its own.  35 mL of contrast was injected during the procedure.  The tube was  marked at the level of the nose at the 76 cm length.  Estimated blood  loss during the procedure was less than 5 mL. No specimens collected.  There were no complications of the procedure.    Medications used: 35 ml Omnipaque 240, 7 mL 2% Lidocaine Gel  Images Obtained: 2      Impression:       IMPRESSION: Successful NG tube placement however unable to pass the  feeding tube into the jejunum. Do not use this tube..    ANDRÉS GAR PA-C      Testing Performed By     Lab - Abbreviation Name Director Address Valid Date Range    104 - Rad Rslts RADIOLOGY RESULTS Unknown Unknown 05 1553 - Present               ECG/EMG Results      Echocardiogram Complete [[id number]]  Resulted: 18 0931, Result status: Edited Result - FINAL    Ordering provider: Pily Lopez DO  18 1223 Resulted by: Alin Salomon MD    Performed: 18 1039 - 18 1039 Resulting lab: RADIOLOGY RESULTS    Narrative:       [id number]  [facility]  [id number]  [id number]^DILLON^PILY^LEEANN           [facility]  Echocardiography Laboratory  [address] [place] [address]        Name: NIKITA KEBEDE  MRN: [id number]  : 1935  Study Date: 2018 09:31 AM  Age: 83  yrs  Gender: Female  Patient Location: [facility]  Reason For Study: Chest Pain  Ordering Physician: PILY CARRANZA  Referring Physician: Mora Bang  Performed By: Arlen Bennett,     BSA: 1.8 m2  Height: 65 in  Weight: 152 lb  HR: 63  BP: 114/62 mmHg  _____________________________________________________________________________  __        Procedure  Complete Portable Echo Adult.  _____________________________________________________________________________  __        Interpretation Summary     Left ventricular systolic function is normal.  The visual ejection fraction is estimated at 60-65%.  No regional wall motion abnormalities noted.  The study was technically difficult. There is no comparison study available.  _____________________________________________________________________________  __        Left Ventricle  The left ventricle is normal in size. A sigmoid septum is present. There is  concentric remodeling present. Left ventricular systolic function is normal.  The visual ejection fraction is estimated at 60-65%. Grade I or early  diastolic dysfunction. No regional wall motion abnormalities noted.     Right Ventricle  The right ventricle is normal size. The right ventricular systolic function is  normal.     Atria  Normal left atrial size. Right atrial size is normal.     Mitral Valve  There is moderate mitral annular calcification. The mitral valve leaflets are  mildly thickened. posterior leaflet motion is somewhat restricted. There is  trace mitral regurgitation.        Tricuspid Valve  There is mild (1+) tricuspid regurgitation. The right ventricular systolic  pressure is approximated at 18.7 mmHg plus the right atrial pressure. Normal  IVC (1.5-2.5cm) with >50% respiratory collapse; right atrial pressure is  estimated at 5-10mmHg.     Aortic Valve  There is mild trileaflet aortic sclerosis. No aortic stenosis is present.     Pulmonic Valve  Normal pulmonic valve. There is mild (1+) pulmonic valvular  regurgitation.     Vessels  The aortic root is normal size.     Pericardium  There is no pericardial effusion.        Rhythm  Sinus rhythm was noted.  _____________________________________________________________________________  __  MMode/2D Measurements & Calculations  IVSd: 1.3 cm     LVIDd: 3.7 cm  LVIDs: 2.5 cm  LVPWd: 1.2 cm  FS: 33.0 %  LV mass(C)d: 152.4 grams  LV mass(C)dI: 86.6 grams/m2  Ao root diam: 3.3 cm  LA dimension: 3.2 cm  asc Aorta Diam: 3.5 cm  LA/Ao: 0.97  LA Volume (BP): 37.9 ml  LA Volume Index (BP): 21.5 ml/m2  RWT: 0.64           Doppler Measurements & Calculations  MV E max richa: 90.2 cm/sec  MV A max richa: 125.6 cm/sec  MV E/A: 0.72  MV dec time: 0.36 sec  PA acc time: 0.13 sec  TR max richa: 216.3 cm/sec  TR max P.7 mmHg  E/E' av.3  Lateral E/e': 16.9  Medial E/e': 21.8           _____________________________________________________________________________  __           Report approved by: Kandice Jones 2018 01:44 PM       1    Type Source Collected On     18 0931          View Image (below)              Encounter-Level Documents:     There are no encounter-level documents.      Order-Level Documents:     There are no order-level documents.

## 2018-11-01 NOTE — IP AVS SNAPSHOT
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 11/10/2018  8:56 PM London Cordova MD Physician             Progress Notes by Nhi Andrews LICSW at 11/10/2018  4:16 PM     Author:  Nhi Andrews LICSW Service:  Social Work Author Type:      Filed:  11/10/2018  4:17 PM Date of Service:  11/10/2018  4:16 PM Creation Time:  11/10/2018  4:16 PM    Status:  Signed :  Nhi Andrews LICSW ()         SW:  D:  Received a call back from [facility].  They have no available beds.  Will need to get more choices from patient.  P:  Will continue to follow.[SJ1.1]     Revision History        User Key Date/Time User Provider Type Action    > SJ1.1 11/10/2018  4:17 PM Nhi Andrews, [facility]  Sign            Progress Notes by Yaima Mccartney MD at  11/10/2018  9:29 AM     Author:  Yaima Mccartney MD Service:  Hospitalist Author Type:  Physician    Filed:  11/10/2018  9:39 AM Date of Service:  11/10/2018  9:29 AM Creation Time:  11/10/2018  9:29 AM    Status:  Signed :  Yaima Mccartney MD (Physician)         [facility]    Hospitalist Progress Note :     Cumulative Summary: Cristal Wynn is a 83 year old female with past medical history significant for coronary artery disease on CT angiogram, osteoarthritis, essential hypertension, bilateral cataracts and history of brain hemorrhage who was admitted on 11/1/2018 when she presented with recurrent vomiting with chest pain and elevated troponin.  Patient was admitted for further evaluation and management.  Her EKG was not indicative of acute coronary syndrome, initially she was a started on heparin infusion along with aspirin and metoprolol.  She was also continued on IV proton pump inhibitor and gastroenterology was also consulted.  Patient was evaluated by gastroenterology for generalized abdominal chronic epigastric pain associated with chronic nausea, vomiting and severe anorexia.  Initially patient is started to show improvement in her GI symptoms but then started to have more bouts of vomiting.  GI was a strongly suspicious for gastroparesis. They recommended stopping patient Reglan and patient to be evaluated with gastric emptying study. Her gastric emptying study showed severe gastroparesis.  Patient was a started on routine IV Zofran along with Reglan oral 4 times a day but continued to have significant emesis.    Assessment & Plan     Active Problems:  Chronic epigastric discomfort, associated with nausea and vomiting: Most likely secondary to severe gastroparesis, patient was initially started on oral Reglan and oral azithromycin was added, as patient continued to be severely symptomatic her medications were changed to IV.  As patient not have any significant clinical improvement  and continued to have multiple episodes of nausea and vomiting, GI ordered N interventional radiology but not able to place the NG tube andJ tube to be placed by interventional radiology.  GI took patient back for EGD.  Patient was found to have LA grade D reflux esophagitis, residual food in the stomach, medium sized hiatal hernia and adult hypertrophic pyloric stenosis.  No areas attempts were made to dilate and eventually stent was placed.  Patient was a started on clear liquid diet which he tolerated well.  Patient has been evaluated by GI this morning and her diet is advanced to full liquid diet and her Reglan and Protonix is changed to oral.  Anorexia  Acute esophagitis as well as chronic gastritis  Severe gastroparesis: Patient underwent gastric emptying study   Patient was found to have severe delayed gastric emptying with food retention of 100% at 1 hour, 95% at 2 hours, 93% at 3-hour and 88% at 4-hour as compared to normal range of 30-90% at 1 hour, less than 60% at 2 hours, less than 30% and 3 upper and less than 10% at 4 hours.    --Continue to monitor patient closely, tolerating the full liquid diet.  -- GI has ordered the Ct scan of abdomen , will follow up on results   --Encourage patient to continue with small and frequent full liquid meal.  --Encourage patient to do incentive spirometry.  ----Continue patient on oral azithromycin, Reglan and Protonix.  Appreciate gastroenterology help with the care of this nice patient, GI will be following up in 1 week.  Patient will probably need EGD with endoscopic ultrasound in couple of weeks.    Bilateral pleural effusion , concern for acute on chronic diastolic heart failure: patient is requiring one liter of oxygen, chest xray is concerning for congestion and B/L pleural effusion although BNP is in normal range . Most likely she is fluid overloaded from fluid resuscitation when she was not able to consume any thing oral   Elevated troponin in the setting of  vomiting  History of nonobstructive coronary artery disease:   Patient presented with elevated troponin, does have history of mild nonobstructive coronary artery disease via CTA.  EKG on admission did not show any indications of ACS, suspecting that her troponin elevation is secondary to demand ischemia secondary to her gastric issues. Initially she was a started on heparin infusion and aspirin.  Now patient is off heparin infusion once her echocardiogram showed normal left ventricular ejection fraction and no wall motion abnormalities.  Essential hypertension: her beta-blocker was changed to IV .  As patient is able to take her oral medications might be changed back to oral.  Acute hypoxia:  Patient does not seem to be in any acute respiratory failure at this point but has been requiring 1-2 L of oxygen, has been lying in bed for last many days might be secondary to atelectasis versus some fluid overload.  Patient does not have any significant crackles on examination.    --Continue aspirin 81 mg p.o. daily.  -- Toprol-XL 25 mg p.o. daily.  -- Portable chest x-ray is concerning for congestion  -- lasix 20 mg po two doses today  -- recheck potassium at 18:00 , replace if low as per protocol, she might need IV replacement as she is not able to tolerate oral potassium well  -- continue to try wean patient off oxygen  -- increase activity  -- incentive spirometry  --Continue patient on losartan 100 mg p.o. daily.  -- start her back on her hydrochlorothiazide from tomorrow morning   --Potassium supplement due to potassium for vomiting    DVT Prophylaxis: Pneumatic Compression Devices, encouraged patient to walk three times a day as she has been hospitalized for over 6 days now     Code Status: Full Code    Disposition: plan to discharge patient home tomorrow as she will undergo diuresis today , plan to wean her off oxygen    Yaima Mccartney MD, FACP  Text Page (7am - 6pm)      Interval History  :  Patient was seen and  examined,feeling better, minimal emesis this morning otherwise tolerating the diet.till SOB easily , sitting in chair and taking walks, we discussed about diuresing her considering her chest xray results and weaning her off oxygen today   She was in agreement    -Data reviewed today: I reviewed all new labs and imaging results over the last 24 hours.    I personally reviewed no images or EKG's today.    Physical Exam   Temp: 97.9  F (36.6  C) Temp src: Oral BP: 131/72 Pulse: 91 Heart Rate: 91 Resp: 18 SpO2: 91 % O2 Device: Nasal cannula Oxygen Delivery: 1 LPM  Vitals:    11/06/18 0653 11/07/18 0552 11/10/18 0700   Weight: 74.6 kg (164 lb 8 oz) 76.7 kg (169 lb 1.5 oz) 72.6 kg (160 lb)     Vital Signs with Ranges  Temp:  [97.4  F (36.3  C)-97.9  F (36.6  C)] 97.9  F (36.6  C)  Pulse:  [88-91] 91  Heart Rate:  [78-91] 91  Resp:  [18-19] 18  BP: (131-171)/(72-91) 131/72  SpO2:  [91 %-93 %] 91 %  I/O last 3 completed shifts:  In: 270 [P.O.:270]  Out: 825 [Urine:625; Emesis/NG output:200]    GENERAL: Alert , awake and oriented. NAD. Conversational, appropriate.looks slightly tired but pleasant   HEENT: Normocephalic. EOMI. No icterus or injection. Nares normal.   LUNGS: Clear to auscultation. No dyspnea at rest.No crackles or wheezing .   HEART: Regular rate. Extremities perfused.   ABDOMEN: Soft, nontender, and nondistended. Positive bowel sounds.   EXTREMITIES: No LE edema noted.   NEUROLOGIC: Moves extremities x4 on command. No acute focal neurologic abnormalities noted.     Medications     - MEDICATION INSTRUCTIONS -       - MEDICATION INSTRUCTIONS -         azithromycin  250 mg Oral Daily     losartan  100 mg Oral Daily     metoclopramide  10 mg Oral TID AC     metoprolol succinate  25 mg Oral Daily     ondansetron  8 mg Oral TID AC     pantoprazole  40 mg Oral QAM AC     sodium chloride (PF)  3 mL Intracatheter Q8H       Data     Recent Labs  Lab 11/10/18  0636 11/09/18  0628 11/08/18  0634  11/05/18  0651   WBC 8.9  8.8  --   --  7.8   HGB 12.3 11.8  --   --  12.2   MCV 94 95  --   --  94    339  --   --  291    141 144  < > 141   POTASSIUM 3.8 3.7 3.6  < > 3.3*   CHLORIDE 105 106 105  < > 110*   CO2 25 30 35*  < > 24   BUN 10 15 14  < > 7   CR 0.68 0.73 0.81  < > 0.70   ANIONGAP 8 5 4  < > 7   DINH 8.4* 8.5 8.8  < > 8.2*   GLC 96 103* 114*  < > 83   ALBUMIN 2.5* 2.6*  --   --   --    PROTTOTAL 5.8* 5.7*  --   --   --    BILITOTAL 0.4 0.4  --   --   --    ALKPHOS 72 67  --   --   --    ALT 62* 53*  --   --   --    AST 51* 44  --   --   --    < > = values in this interval not displayed.    Imaging:   Recent Results (from the past 24 hour(s))   XR Chest Port 1 View    Narrative    CHEST ONE VIEW UPRIGHT 11/9/2018 5:40 PM     HISTORY: Hypoxia, concern for atelectasis versus fluid overload.     COMPARISON: 11/1/2018      Impression    IMPRESSION: Marked bibasilar effusions and/or infiltrates, question  congestive heart failure.    ELIF PABLO MD   CT Abdomen Pelvis w Contrast    Narrative    CT ABDOMEN AND PELVIS WITH CONTRAST   11/10/2018 7:26 AM     HISTORY: Abdominal pain, nausea, vomiting.     TECHNIQUE:   81 mL Isovue-370. Radiation dose for this scan was  reduced using automated exposure control, adjustment of the mA and/or  kV according to patient size, or iterative reconstruction technique.    COMPARISON: 10/26/2018    FINDINGS:   There are moderate bilateral pleural effusions, increased  in size since the previous examination. Underlying  consolidation/atelectasis in both lung bases. The heart size is  normal.    There is a 3.2 cm cyst in the left hepatic lobe, unchanged. The liver  is otherwise unremarkable. The gallbladder is absent. The spleen,  pancreas, and adrenal glands are unremarkable. There are bilateral  renal cysts. A gastric stent has been placed. There is moderate  gastric wall thickening. There is no evidence of bowel obstruction.  Colonic diverticulosis is again seen. There is contrast in the  colon.  Appendix not identified. Small amount of abdominal ascites. Small to  moderate amount of pelvic ascites, increased since the previous  examination. There is no free intraperitoneal air. Again noted are  areas of nodularity in the mesenteric fat. A right hip arthroplasty  causes beam hardening artifact in the pelvis. There is a mildly  enlarged right external iliac lymph node, unchanged.         Impression    IMPRESSION:   1. Moderate bilateral pleural effusions, increased since the previous  examination.  2. Interval placement of a gastric stent. There is persistent gastric  wall thickening which could be due to inflammation but malignancy  could also cause this appearance.  3. Small amount of abdominal and moderate amount of pelvic ascites.  4. Soft tissue nodularity in the mesenteric fat worrisome for  peritoneal metastatic disease.  5. Colonic diverticulosis. No evidence of acute diverticulitis.    MARGRET SAUCEDA MD[SI1.1]          Revision History        User Key Date/Time User Provider Type Action    > SI1.1 11/10/2018  9:39 AM Yaima Mccartney MD Physician Sign            Progress Notes by Yaima Mccartney MD at 11/9/2018 12:55 PM     Author:  Yaima Mccartney MD Service:  Hospitalist Author Type:  Physician    Filed:  11/9/2018  2:55 PM Date of Service:  11/9/2018 12:55 PM Creation Time:  11/9/2018 12:55 PM    Status:  Signed :  Yaima Mccartney MD (Physician)         [facility]    Hospitalist Progress Note :     Cumulative Summary: Cristal Wynn is a 83 year old female with past medical history significant for coronary artery disease on CT angiogram, osteoarthritis, essential hypertension, bilateral cataracts and history of brain hemorrhage who was admitted on 11/1/2018 when she presented with recurrent vomiting with chest pain and elevated troponin.  Patient was admitted for further evaluation and management.  Her EKG was not indicative of acute coronary syndrome, initially she was a  started on heparin infusion along with aspirin and metoprolol.  She was also continued on IV proton pump inhibitor and gastroenterology was also consulted.  Patient was evaluated by gastroenterology for generalized abdominal chronic epigastric pain associated with chronic nausea, vomiting and severe anorexia.  Initially patient is started to show improvement in her GI symptoms but then started to have more bouts of vomiting.  GI was a strongly suspicious for gastroparesis. They recommended stopping patient Reglan and patient to be evaluated with gastric emptying study. Her gastric emptying study showed severe gastroparesis.  Patient was a started on routine IV Zofran along with Reglan oral 4 times a day but continued to have significant emesis.    Assessment & Plan     Active Problems:  Chronic epigastric discomfort, associated with nausea and vomiting: Most likely secondary to severe gastroparesis,[SI1.1] patient was initially started on oral Reglan and oral azithromycin was added, as patient continued to be severely symptomatic her medications were changed to IV.  As patient not have any significant clinical improvement and continued to have multiple episodes of nausea and vomiting, GI ordered N interventional radiology but not able to place the NG tube andJ tube to be placed by interventional radiology.  GI took patient back for EGD.  Patient was found to have LA grade D reflux esophagitis, residual food in the stomach, medium sized hiatal hernia and adult hypertrophic pyloric stenosis.  No areas attempts were made to dilate and eventually stent was placed.  Patient was a started on clear liquid diet which he tolerated well.  Patient has been evaluated by GI this morning and her diet is advanced to full liquid diet and her Reglan and Protonix is changed to oral.[SI1.2]  Anorexia  Acute esophagitis as well as chronic gastritis  Severe gastroparesis: Patient underwent gastric emptying study   Patient was found to  have severe delayed gastric emptying with food retention of 100% at 1 hour, 95% at 2 hours, 93% at 3-hour and 88% at 4-hour as compared to normal range of 30-90% at 1 hour, less than 60% at 2 hours, less than 30% and 3 upper and less than 10% at 4 hours.[SI1.1]    Continue to monitor patient closely, tolerating the full liquid diet.  All the questions are answered for patient and her daughter.  Encourage patient to continue with small and frequent full liquid meal.  Encourage patient to do incentive spirometry.  Continue patient on oral azithromycin, Reglan and Protonix.  Appreciate gastroenterology help with the care of this nice patient, GI will be following up in 1 week.  Patient will probably need EGD with endoscopic ultrasound in couple of weeks.[SI1.2]    Elevated troponin in the setting of vomiting  History of nonobstructive coronary artery disease:   Patient presented with elevated troponin, does have history of mild nonobstructive coronary artery disease via CTA.  EKG on admission did not show any indications of ACS, suspecting that her troponin elevation is secondary to demand ischemia secondary to her gastric issues. Initially she was a started on heparin infusion and aspirin.  Now patient is off heparin infusion once her echocardiogram showed normal left ventricular ejection fraction and no wall motion abnormalities.  Essential hypertension:[SI1.1] her beta-blocker was changed to IV .  As patient is able to take her oral medications might be changed back to oral.  Acute hypoxia:  Patient does not seem to be in any acute respiratory failure at this point but has been requiring 1-2 L of oxygen, has been lying in bed for last many days might be secondary to atelectasis versus some fluid overload.  Patient does not have any significant crackles on examination.    --Continue aspirin 81 mg p.o. daily.  --Discontinue IV Lopressor start patient back on Toprol-XL 25 mg p.o. daily.  --Portable chest x-ray to  follow up on workup for hypoxia, patient does not have any fever or leukocytosis.  Patient does not have any cough.  --Continue patient on losartan 100 mg p.o. daily.  --Once patient is able to tolerate the medication will start patient back on hydrochlorothiazide also.  --Potassium supplement due to potassium for vomiting[SI1.2]    DVT Prophylaxis: Pneumatic Compression Devices, encouraged patient to walk three times a day as she has been hospitalized for over 6 days now     Code Status: Full Code    Disposition: Discharge is pending at this point due to patient clinical improvement.[SI1.1] Hopefully can be discharged tomorrow if able to tolerate the diet .[SI1.2]    Yaima Mccartney MD, FACP  Text Page (7am - 6pm)      Interval History[SI1.1]  :  Patient was seen and examined, daughter also present in the room.  Patient is feeling okay, still feels slightly nauseated thinks that her stomach is little hurting.  Discussed with them in detail regarding her EGD findings which are concerning for gastritis and she undergoing long procedure of getting prepyloric his stent.  Patient is definitely able to hold more food down as compared to before.  She has walked with physical and occupational therapy and at this time is hoping to be discharged home if continues to do well she is a still requiring oxygen she is denying any fever or productive cough.[SI1.2]    -Data reviewed today: I reviewed all new labs and imaging results over the last 24 hours.    I personally reviewed no images or EKG's today.    Physical Exam   Temp: 97.4  F (36.3  C) Temp src: Oral BP: 155/78 Pulse: 85 Heart Rate: 78 Resp: 18 SpO2: 91 % O2 Device: Nasal cannula Oxygen Delivery: 1 LPM  Vitals:    11/05/18 0418 11/06/18 0653 11/07/18 0552   Weight: 71.7 kg (158 lb) 74.6 kg (164 lb 8 oz) 76.7 kg (169 lb 1.5 oz)     Vital Signs with Ranges  Temp:  [97.4  F (36.3  C)-98.8  F (37.1  C)] 97.4  F (36.3  C)  Pulse:  [85] 85  Heart Rate:  [71-99] 78  Resp:   [13-35] 18  BP: (121-187)/() 155/78  SpO2:  [87 %-96 %] 91 %  I/O last 3 completed shifts:  In: 250 [P.O.:240; I.V.:10]  Out: 1000 [Urine:500; Emesis/NG output:500]    GENERAL: Alert , awake and oriented. NAD. Conversational, appropriate.looks slightly tired but pleasant , daughter present at the bedside   HEENT: Normocephalic. EOMI. No icterus or injection. Nares normal.   LUNGS: Clear to auscultation. No dyspnea at rest.[SI1.1]No crackles or wheezing .[SI1.2]   HEART: Regular rate. Extremities perfused.   ABDOMEN: Soft, nontender, and nondistended. Positive bowel sounds.   EXTREMITIES: No LE edema noted.   NEUROLOGIC: Moves extremities x4 on command. No acute focal neurologic abnormalities noted.     Medications     - MEDICATION INSTRUCTIONS -       - MEDICATION INSTRUCTIONS -       zz extemporaneous template 50 mL/hr at 11/08/18 2107       [START ON 11/10/2018] azithromycin  250 mg Oral Daily     losartan  100 mg Oral Daily     metoclopramide  10 mg Oral TID AC     metoprolol  5 mg Intravenous Q6H     ondansetron  8 mg Oral TID AC     [START ON 11/10/2018] pantoprazole  40 mg Oral QAM AC     potassium chloride  20 mEq Oral Once     sodium chloride (PF)  3 mL Intracatheter Q8H       Data     Recent Labs  Lab 11/09/18  0628 11/08/18  0634 11/07/18  0638  11/05/18  0651 11/03/18  0742   WBC 8.8  --   --   --  7.8 6.1   HGB 11.8  --   --   --  12.2 11.8   MCV 95  --   --   --  94 93     --   --   --  291 303    144 146*  < > 141 141   POTASSIUM 3.7 3.6 3.2*  < > 3.3* 3.7   CHLORIDE 106 105 106  < > 110* 110*   CO2 30 35* 28  < > 24 24   BUN 15 14 14  < > 7 9   CR 0.73 0.81 0.76  < > 0.70 0.81   ANIONGAP 5 4 12  < > 7 7   DINH 8.5 8.8 8.7  < > 8.2* 8.2*   * 114* 87  < > 83 87   ALBUMIN 2.6*  --   --   --   --   --    PROTTOTAL 5.7*  --   --   --   --   --    BILITOTAL 0.4  --   --   --   --   --    ALKPHOS 67  --   --   --   --   --    ALT 53*  --   --   --   --   --    AST 44  --   --   --    --   --    < > = values in this interval not displayed.    Imaging:   Recent Results (from the past 24 hour(s))   XR Feeding Tube Placement    Narrative    FEEDING TUBE PLACEMENT   11/8/2018 2:50 PM    HISTORY: Gastroparesis with vomiting.    COMPARISON: None    FINDINGS: 9.3 minutes of fluoroscopy were utilized for placement of a  feeding tube.  At the final position, the feeding tube tip was the  stomach. Numerous attempts were made to advance this tube beyond the  stomach without success. Dr. Moya was also consulted. He attempted in  addition without success. We left the tube in the stomach and we will  evaluate with x-ray tomorrow to see if the tube advances on its own.  35 mL of contrast was injected during the procedure.  The tube was  marked at the level of the nose at the 76 cm length.  Estimated blood  loss during the procedure was less than 5 mL. No specimens collected.  There were no complications of the procedure.    Medications used: 35 ml Omnipaque 240, 7 mL 2% Lidocaine Gel  Images Obtained: 2      Impression    IMPRESSION: Successful NG tube placement however unable to pass the  feeding tube into the jejunum. Do not use this tube..    ADITYA HSAH PA-C[SI1.1]          Revision History        User Key Date/Time User Provider Type Action    > SI1.2 11/9/2018  2:55 PM Yaima Mccartney MD Physician Sign     SI1.1 11/9/2018 12:55 PM aYima Mccartney MD Physician             Progress Notes by Tiesha Rangel PA-C at 11/9/2018  8:52 AM     Author:  Tiesha Rangel PA-C Service:  Gastroenterology Author Type:  Physician Assistant - C    Filed:  11/9/2018  8:58 AM Date of Service:  11/9/2018  8:52 AM Creation Time:  11/9/2018  8:52 AM    Status:  Signed :  Tiesha Rangel PA-C (Physician Assistant - C)         [facility]  Gastroenterology Progress Note     Cristal Wynn MRN# [id number]   YOB: 1935 Age: 83 year old          Assessment and Plan:     Troponin level  elevated  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 Numerous attempts made to dilate. Stent placed. Patient has tolerated clear liquids will advance to full. Will switch azithromycin, Reglan and Protonix to oral tabs. Noted to have protein of 5.7 related to lack of oral intake. Potassium is corrected and normal at 4.7. WBC 8.8.  Will likely plan discharge tomorrow and have follow-up with [facility] in one week. Will need repeat EGD with endoscopic ultrasound in 2 weeks. Need to rule out neoplastic cause for prepyloric stricture.            Gastritis without bleeding, unspecified chronicity, unspecified gastritis type  Generalized abdominal pain  Elevated troponin      Interval History:   no new complaints, doing well, denies chest pain, denies shortness of breath, denies abdominal pain, alert, oriented to person, place and time and doing well; no cp, sob, n/v/d, or abd pain.              Review of Systems:   C: NEGATIVE for fever, chills, change in weight  E/M: NEGATIVE for ear, mouth and throat problems  R: NEGATIVE for significant cough or SOB  CV: NEGATIVE for chest pain, palpitations or peripheral edema             Medications:   I have reviewed this patient's current medications    azithromycin  250 mg Oral Daily     losartan  100 mg Oral Daily     metoclopramide  10 mg Oral TID AC     metoprolol  5 mg Intravenous Q6H     ondansetron  8 mg Oral TID AC     pantoprazole  40 mg Oral QAM AC     potassium chloride  20 mEq Oral Once     sodium chloride (PF)  3 mL Intracatheter Q8H                  Physical Exam:   Vitals were reviewed  Vital Signs with Ranges  Temp:  [97.7  F (36.5  C)-98.8  F (37.1  C)] 97.9  F (36.6  C)  Pulse:  [85] 85  Heart Rate:  [71-99] 79  Resp:  [13-35] 18  BP: (121-187)/() 145/80  SpO2:  [87 %-96 %] 94 %  I/O last 3 completed shifts:  In: 250 [P.O.:240; I.V.:10]  Out: 1000 [Urine:500; Emesis/NG  output:500]  Constitutional: healthy, alert and no distress   Cardiovascular: negative, PMI normal. No lifts, heaves, or thrills. RRR. No murmurs, clicks gallops or rub  Respiratory: negative, Percussion normal. Good diaphragmatic excursion. Lungs clear  Abdomen: Abdomen soft, non-tender. BS normal. No masses, organomegaly           Data:   I reviewed the patient's new clinical lab test results.   Recent Labs   Lab Test  11/09/18 0628 11/05/18   0651  11/03/18   0742   11/01/18   1728   WBC  8.8  7.8  6.1   < >  12.2*   HGB  11.8  12.2  11.8   < >  14.7   MCV  95  94  93   < >  90   PLT  339  291  303   < >  380   INR   --    --    --    --   1.01    < > = values in this interval not displayed.     Recent Labs   Lab Test  11/09/18 0628 11/08/18   0634  11/07/18   0638   POTASSIUM  3.7  3.6  3.2*   CHLORIDE  106  105  106   CO2  30  35*  28   BUN  15  14  14   ANIONGAP  5  4  12     Recent Labs   Lab Test  11/09/18 0628  11/02/18   0012  11/01/18   1728  10/25/18   2339   12/23/16   1415   ALBUMIN  2.6*   --   3.9  4.0   < >  3.6   BILITOTAL  0.4   --   0.6  0.5   < >  0.7   ALT  53*   --   61*  23   < >  30   AST  44   --   46*  35   < >  30   PROTEIN   --   10*   --    --    --    --    LIPASE   --    --   177  153   --   639*    < > = values in this interval not displayed.       I reviewed the patient's new imaging results.    All laboratory data reviewed  All imaging studies reviewed by me.    Tiesha Rangel PA-C,  11/9/2018  [facility]  Office : [phone]  Cell: [phone] (Dr. Cordova)  Cell: [phone] (Tiesha Rangel PA-C)[EK1.1]       Revision History        User Key Date/Time User Provider Type Action    > EK1.1 11/9/2018  8:58 AM Keech, Tiesha Martínez, PA-C Physician Assistant - C Sign            Progress Notes by Kate Menon, RN at 11/8/2018  6:45 PM     Author:  Kate Menon, RN Service:  (none) Author Type:  Registered Nurse    Filed:  11/8/2018  6:46 PM Date of  Service:  11/8/2018  6:45 PM Creation Time:  11/8/2018  6:45 PM    Status:  Signed :  Kate Menon, RN (Registered Nurse)         Patient arrived from endoscopy at 1845. Alert and oriented x 4. VSS, on 3 L oxygen[NG1.1]     Revision History        User Key Date/Time User Provider Type Action    > NG1.1 11/8/2018  6:46 PM Kate Menon, RN Registered Nurse Sign            Progress Notes by London Cordova MD at 11/8/2018  6:22 PM     Author:  London Cordova MD Service:  Gastroenterology Author Type:  Physician    Filed:  11/8/2018  6:26 PM Date of Service:  11/8/2018  6:22 PM Creation Time:  11/8/2018  6:22 PM    Status:  Signed :  London Cordova MD (Physician)         EGD for gastric outlet obstruction.  Endoscopy findings were consistent with severe esophagitis involving throughout esophagus significantly distended gastric body with large amount of retained food and coffee-ground material.  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  EGD scope was advanced with difficulty across stricture stricture was dilated repeat attempt again showed persistent tight stricture.  Guidewire was passed nasojejunal tube was attempted to be passed across over the guidewire which was unsuccessful.  Subsequently Rj-Cook uncovered evolution stent was passed across the stricture with good placement with fluoroscopy of gastric content and duodenal secretions.  Total procedure time was 65-minute procedure was significantly difficult due to anatomy.  Patient did very well during procedure no immediate complication.  I will recommend the patient to be started on clear liquid diet tonight continue on current medications.  Possibly advance diet to full liquid tomorrow after evaluation in the morning.    London Cordova MD FACP  [facility] gastroenterology.[AB1.1]     Revision History        User  Key Date/Time User Provider Type Action    > AB1.1 11/8/2018  6:26 PM London Cordova MD Physician Sign            Progress Notes by Yaima Mccartney MD at 11/8/2018 11:18 AM     Author:  Yaima Mccartney MD Service:  Hospitalist Author Type:  Physician    Filed:  11/8/2018  3:07 PM Date of Service:  11/8/2018 11:18 AM Creation Time:  11/8/2018 11:18 AM    Status:  Signed :  Yaima Mccartney MD (Physician)         [facility]    Hospitalist Progress Note :     Cumulative Summary: Cristal Wynn is a 83 year old female with past medical history significant for coronary artery disease on CT angiogram, osteoarthritis, essential hypertension, bilateral cataracts and history of brain hemorrhage who was admitted on 11/1/2018 when she presented with recurrent vomiting with chest pain and elevated troponin.  Patient was admitted for further evaluation and management.  Her EKG was not indicative of acute coronary syndrome, initially she was a started on heparin infusion along with aspirin and metoprolol.  She was also continued on IV proton pump inhibitor and gastroenterology was also consulted.  Patient was evaluated by gastroenterology for generalized abdominal chronic epigastric pain associated with chronic nausea, vomiting and severe anorexia.  Initially patient is started to show improvement in her GI symptoms but then started to have more bouts of vomiting.  GI was a strongly suspicious for gastroparesis.  They recommended stopping patient Reglan and patient to be evaluated with gastric emptying study. Her gastric emptying study showed severe gastroparesis.  Patient was a started on routine IV Zofran along with Reglan oral 4 times a day but continued to have significant emesis.    Assessment & Plan     Active Problems:  Chronic epigastric discomfort, associated with nausea and vomiting: Most likely secondary to severe gastroparesis, continues to have multiple episodes of vomiting in the last 24  hours was not able to keep given oral Reglan down.  Patient was evaluated by gastroenterology again this morning.  Anorexia  Acute esophagitis as well as chronic gastritis  Severe gastroparesis: Patient underwent gastric emptying study   Patient was found to have severe delayed gastric emptying with food retention of 100% at 1 hour, 95% at 2 hours, 93% at 3-hour and 88% at 4-hour as compared to normal range of 30-90% at 1 hour, less than 60% at 2 hours, less than 30% and 3 upper and less than 10% at 4 hours.[SI1.1]  Since then patient has been a started on gastroparesis diet with frequent oral Reglan but patient was unable to tolerate, her medications were changed to IV with patient continues to have severe nausea and vomiting.[SI1.2]    --Continue to monitor patient closely.  --Long discussion with patient and her daughter regarding plan of care, all the questions were answered.encouraged them to consider tube feedings at least as bridging while she continues to work with oral intake[SI1.1]   --Discussed the case with GI, they are recommending placing NG tube and then the starting patient on tube feedings.  --Due to the unclear etiology of her idiopathic gastroparesis, GI is also recommending CT enterography with contrast.  --Patient underwent to get NG tube placement via radiology but they were not able to place the tube.  --Gastroenterology might take patient back for EGD and placed the tube themselves tonight or tomorrow morning.  --As patient is unable to keep anything dominant we will give her gentle fluids at 50 cc/h.  --Continue patient on Protonix IV and IV Reglan 10 mg every 6 hours.  --Continue patient on azithromycin 250 mg IV every 24 hours to help with gastroparesis.  --Patient was evaluated by nutrition and tube feeding orders are in place if patient gets the NG tube place.  --We will check CMP and CBC tomorrow morning.[SI1.2]    Elevated troponin in the setting of vomiting  History of nonobstructive  coronary artery disease:   Patient presented with elevated troponin, does have history of mild nonobstructive coronary artery disease via CTA.  EKG on admission did not show any indications of ACS, suspecting that her troponin elevation is secondary to demand ischemia secondary to her gastric issues. Initially she was a started on heparin infusion and aspirin.  Now patient is off heparin infusion once her echocardiogram showed normal left ventricular ejection fraction and no wall motion abnormalities.  Essential hypertension: Patient blood pressure[SI1.1] is stable on IV lopressor , she vomited Losartan this morning[SI1.2]     -- Continue aspirin 81 mg p.o. daily.  --[SI1.1] Off[SI1.2] oral Toprol[SI1.1] XL[SI1.2] and[SI1.1] continue[SI1.2] Lopressor IV 5 mg every 6 hours with holding parameters.  -- Continue patient on losartan 100 mg p.o. Daily  -- Continue to hold her home dose of hydrochlorothiazide, Might not be the best choice for patient considering her risk for dehydration and hypokalemia.  -- Potassium supplement due to K loss from vomiting     DVT Prophylaxis: Pneumatic Compression Devices, encouraged patient to walk three times a day as she has been hospitalized for over[SI1.1] 6[SI1.2] days now     Code Status: Full Code    Disposition: Discharge is pending at this point due to patient clinical improvement.  Of note patient lives alone and family has questions regarding need for rehab after the discharge especially if patient is going to require tube feeds.    Yaima Mccartney MD, FACP  Text Page (7am - 6pm)      Interval History[SI1.1]    Patient was seen and examined, her daughter-in-law is also present in the room.  Patient did have an episode of vomiting this morning.  The most she was able to go without vomiting for 14 hours.  Patient does have very poor oral intake and is almost afraid to eat because of nausea and vomiting.  Long discussion with daughter-in-law regarding placing NG tube to address  nutritional status as patient is not been able to get enough nutrition for over 7-8 days now.  Patient was now open to the idea of getting NG tube.  Also reviewed the plan of care with gastroenterology.[SI1.2]    -Data reviewed today: I reviewed all new labs and imaging results over the last 24 hours.    I personally reviewed no images or EKG's today.    Physical Exam   Temp: 98.3  F (36.8  C) Temp src: Oral BP: 134/74 Pulse: 76 Heart Rate: 79 Resp: 16 SpO2: 90 % O2 Device: None (Room air)    Vitals:    11/05/18 0418 11/06/18 0653 11/07/18 0552   Weight: 71.7 kg (158 lb) 74.6 kg (164 lb 8 oz) 76.7 kg (169 lb 1.5 oz)     Vital Signs with Ranges  Temp:  [97.6  F (36.4  C)-99.1  F (37.3  C)] 98.3  F (36.8  C)  Pulse:  [76-84] 76  Heart Rate:  [79-90] 79  Resp:  [16] 16  BP: (130-165)/(66-81) 134/74  SpO2:  [88 %-92 %] 90 %  I/O last 3 completed shifts:  In: -   Out: 350 [Urine:350]    GENERAL: Alert , awake and oriented. NAD. Conversational, appropriate.looks slightly tired but pleasant , daughter present at the bedside   HEENT: Normocephalic. EOMI. No icterus or injection. Nares normal.   LUNGS: Clear to auscultation. No dyspnea at rest.   HEART: Regular rate. Extremities perfused.   ABDOMEN: Soft, nontender, and nondistended. Positive bowel sounds.   EXTREMITIES: No LE edema noted.   NEUROLOGIC: Moves extremities x4 on command. No acute focal neurologic abnormalities noted.     Medications     - MEDICATION INSTRUCTIONS -         [START ON 11/9/2018] azithromycin  200 mg Oral Daily     losartan  100 mg Oral Daily     metoclopramide (REGLAN) in 50 mL 0.9% sodium chloride infusion  10 mg Intravenous Q6H     metoprolol  5 mg Intravenous Q6H     ondansetron  8 mg Oral TID AC     pantoprazole (PROTONIX) IV  40 mg Intravenous Daily with breakfast     potassium chloride  20 mEq Oral Once       Data     Recent Labs  Lab 11/08/18  0634 11/07/18  0638 11/06/18  0640  11/05/18  0651 11/03/18  0742  11/02/18  0759 11/02/18  0025  11/01/18  1728   WBC  --   --   --   --  7.8 6.1  --   --  8.8 12.2*   HGB  --   --   --   --  12.2 11.8  --   --  13.1 14.7   MCV  --   --   --   --  94 93  --   --  91 90   PLT  --   --   --   --  291 303  --   --  354 380   INR  --   --   --   --   --   --   --   --   --  1.01    146* 144  --  141 141  --  140  --  138   POTASSIUM 3.6 3.2* 3.7  < > 3.3* 3.7  < > 3.0*  --  3.0*   CHLORIDE 105 106 111*  --  110* 110*  --  105  --  99   CO2 35* 28 24  --  24 24  --  26  --  26   BUN 14 14 10  --  7 9  --  18  --  20   CR 0.81 0.76 0.66  --  0.70 0.81  --  0.98  --  1.11*   ANIONGAP 4 12 9  --  7 7  --  9  --  13   DINH 8.8 8.7 8.3*  --  8.2* 8.2*  --  8.4*  --  10.0   * 87 84  --  83 87  --  85  --  88   ALBUMIN  --   --   --   --   --   --   --   --   --  3.9   PROTTOTAL  --   --   --   --   --   --   --   --   --  7.7   BILITOTAL  --   --   --   --   --   --   --   --   --  0.6   ALKPHOS  --   --   --   --   --   --   --   --   --  85   ALT  --   --   --   --   --   --   --   --   --  61*   AST  --   --   --   --   --   --   --   --   --  46*   LIPASE  --   --   --   --   --   --   --   --   --  177   TROPI  --   --   --   --   --   --   --  0.060*  --  0.127*   < > = values in this interval not displayed.    Imaging:   No results found for this or any previous visit (from the past 24 hour(s)).[SI1.1]       Revision History        User Key Date/Time User Provider Type Action    > SI1.2 11/8/2018  3:07 PM Yaima Mccartney MD Physician Sign     SI1.1 11/8/2018 11:18 AM Yaima Mccartney MD Physician             Progress Notes by Shanelle Gutierrez RN at 11/8/2018  2:58 PM     Author:  Shanelle Gutierrez RN Service:  (none) Author Type:  Registered Nurse    Filed:  11/8/2018  2:59 PM Date of Service:  11/8/2018  2:58 PM Creation Time:  11/8/2018  2:58 PM    Status:  Signed :  Shanelle Gutierrez, RN (Registered Nurse)         Updated GI about NJ tube placement, radiology unable to place tube into jejunum, advised  nursing staff not to use. GI will make plan and call back. Continue to pt NPO and leave tube in place, do not use.[KH1.1]     Revision History        User Key Date/Time User Provider Type Action    > KH1.1 2018  2:59 PM Shanelle Gutierrez RN Registered Nurse Sign            Progress Notes by Andrés Gar PA-C at 2018  2:48 PM     Author:  Andrés Gar PA-C Service:  Radiology Author Type:  Physician Assistant - C    Filed:  2018  2:50 PM Date of Service:  2018  2:48 PM Creation Time:  2018  2:48 PM    Status:  Signed :  Andrés Gar PA-C (Physician Assistant - C)         RADIOLOGY PROCEDURE NOTE  Patient name: Cristal Wynn  MRN: [id number]  : 1935    Pre-procedure diagnosis: NJ tube placement requested.  Gastroparesis  Post-procedure diagnosis: Same    Procedure Date/Time: 2018  2:48 PM  Procedure: NG tube place but unable to advance into jejunum.  Estimated blood loss: None  Specimen(s) collected with description: None  The patient tolerated the procedure well with no immediate complications.  Significant findings:UNABLE TO ADVANCE TO JEJUNUM!!!  Do Not use until repeat xray on 2018.  Consider endoscopic guidance to advance the tube past the pyloric sphincter.    See imaging dictation for procedural details.    Provider name: Andrés Gar  Assistant(s):None[KN1.1]         Revision History        User Key Date/Time User Provider Type Action    > KN1.1 2018  2:50 PM Andrés Gar PA-C Physician Assistant - JEOVANY Sign            Progress Notes by London Cordova MD at 2018 12:00 PM     Author:  London Cordova MD Service:  Gastroenterology Author Type:  Physician    Filed:  2018  1:14 PM Date of Service:  2018 12:00 PM Creation Time:  2018  1:10 PM    Status:  Signed :  London Cordova MD (Physician)         [facility]  Gastroenterology Progress Note      Cristal Wynn MRN# [id number]   YOB: 1935 Age: 83 year old          Assessment and Plan:     Troponin level elevated  Patient continues to have nausea and vomiting patient was started on Reglan and subsequently on Zithromax.  Patient did not have any vomiting overnight however patient threw up again this morning.  Patient is denying any other systemic complaint patient has been very poor intake over last week due to refractory vomiting.  Patient lab has been fairly unremarkable.  Patient has significantly delayed gastric emptying study.  I will recommend the patient to be evaluated with small bowel enteroscopy I will also recommend patient to have nasojejunal tube placement and start nasojejunal feeding after CT enterography contrast can be placed through the nasojejunal tube.  So far patient's diagnosis is idiopathic gastroparesis above-mentioned workup is to rule out any small bowel obstruction.  Differential diagnosis plan risks benefits were discussed in detail with hospitalist team and also with patient and family.  I will recommend her to have repeat labs done tomorrow.            Gastritis without bleeding, unspecified chronicity, unspecified gastritis type  Generalized abdominal pain  Elevated troponin      Interval History:   doing well; no cp, sob, n/v/d, or abd pain.              Review of Systems:   C: NEGATIVE for fever, chills, change in weight  E/M: NEGATIVE for ear, mouth and throat problems  R: NEGATIVE for significant cough or SOB  CV: NEGATIVE for chest pain, palpitations or peripheral edema             Medications:   I have reviewed this patient's current medications[AB1.1]    [START ON 11/9/2018] azithromycin  200 mg Oral Daily     losartan  100 mg Oral Daily     metoclopramide (REGLAN) in 50 mL 0.9% sodium chloride infusion  10 mg Intravenous Q6H     metoprolol  5 mg Intravenous Q6H     ondansetron  8 mg Oral TID AC     pantoprazole (PROTONIX) IV  40 mg Intravenous Daily  with breakfast     potassium chloride  20 mEq Oral Once     sodium chloride (PF)  3 mL Intracatheter Q8H[AB1.2]                  Physical Exam:   Vitals were reviewed  Vital Signs with Ranges[AB1.1]  Temp:  [97.8  F (36.6  C)-99.1  F (37.3  C)] 97.8  F (36.6  C)  Pulse:  [76-84] 76  Heart Rate:  [73-90] 73  Resp:  [16] 16  BP: (130-165)/(66-84) 138/84  SpO2:  [90 %-92 %] 91 %  I/O last 3 completed shifts:  In: -   Out: 350 [Urine:350][AB1.2]  Constitutional: healthy, alert and no distress   Cardiovascular: negative, PMI normal. No lifts, heaves, or thrills. RRR. No murmurs, clicks gallops or rub  Respiratory: negative, Percussion normal. Good diaphragmatic excursion. Lungs clear  Head: Normocephalic. No masses, lesions, tenderness or abnormalities  Neck: Neck supple. No adenopathy. Thyroid symmetric, normal size,, Carotids without bruits.  Abdomen: Abdomen soft, non-tender. BS normal. No masses, organomegaly           Data:   I reviewed the patient's new clinical lab test results.[AB1.1]   Recent Labs   Lab Test  11/05/18   0651  11/03/18   0742  11/02/18   0025  11/01/18   1728   WBC  7.8  6.1  8.8  12.2*   HGB  12.2  11.8  13.1  14.7   MCV  94  93  91  90   PLT  291  303  354  380   INR   --    --    --   1.01     Recent Labs   Lab Test  11/08/18   0634  11/07/18   0638  11/06/18   0640   POTASSIUM  3.6  3.2*  3.7   CHLORIDE  105  106  111*   CO2  35*  28  24   BUN  14  14  10   ANIONGAP  4  12  9     Recent Labs   Lab Test  11/02/18   0012  11/01/18   1728  10/25/18   2339  12/24/16   0600  12/23/16   1415   ALBUMIN   --   3.9  4.0  2.7*  3.6   BILITOTAL   --   0.6  0.5  0.5  0.7   ALT   --   61*  23  19  30   AST   --   46*  35  16  30   PROTEIN  10*   --    --    --    --    LIPASE   --   177  153   --   639*[AB1.2]       I reviewed the patient's new imaging results.    All laboratory data reviewed  All imaging studies reviewed by me.[AB1.1]    London Cordova MD[AB1.2],  11/8/2018  [facility]  Office : [phone]  Cell: [phone][AB1.1]     Revision History        User Key Date/Time User Provider Type Action    > AB1.2 11/8/2018  1:14 PM London Cordova MD Physician Sign     AB1.1 11/8/2018  1:10 PM London Cordova MD Physician             Progress Notes by Arlen Whitten at 11/8/2018 10:25 AM     Author:  Arlen Whitten Service:  Spiritual Health Author Type:      Filed:  11/8/2018 10:27 AM Date of Service:  11/8/2018 10:25 AM Creation Time:  11/8/2018 10:25 AM    Status:  Signed :  Arlen Whitten ()         SPIRITUAL HEALTH SERVICES Progress Note  [facility]    Visit with pt and her daughter-in-law Ashleigh, per pt's length of stay.  Pt and family shared the news of pt's recent hospital stays due to GI issues.  They are anticipating a short-term feeding tube to allow pt to recover from her GI problems, with hopes that she'll be able to have knee replacement in a few months.  Pt was in generally good spirits, although she remarked that she feels exhausted by so much time in the hospital.  Provided conversation and support.  SH team will continue to be available if needs arise.                                                                                                                                                 Arlen Whitten M.A.  Staff   Pager [phone]  Phone [phone][PL1.1]         Revision History        User Key Date/Time User Provider Type Action    > PL1.1 11/8/2018 10:27 AM Arlen Whittenin Sign            Progress Notes by Rudy Huynh at 11/8/2018  9:46 AM     Author:  Rudy Huynh Service:  Nutrition Author Type:  Dietetic Intern    Filed:  11/8/2018 10:18 AM Date of Service:  11/8/2018  9:46 AM Creation Time:  11/8/2018  9:46 AM    Status:  Attested :  Rudy Huynh (Dietetic Intern)    Cosigner:  Lou Dumont, RD, LD at 11/8/2018 10:22 AM        Attestation signed by Lou Dumont  "JHON BRAVO at 11/8/2018 10:22 AM        I have reviewed and agree with the following note.  Lou Dumont RD  Pager [phone] (M-F)            [phone] ([facility])                                   CLINICAL NUTRITION SERVICES  -  ASSESSMENT NOTE      RECOMMENDATIONS FOR MD/PROVIDER TO ORDER:   If TF orders are initiated:   Recommend Isosource 1.5 with a goal rate of 50 mL/hr. (See note below)     Recommendations Ordered by Registered Dietitian (RD):   Send Boost Plus between meals and with meals.   MALNUTRITION:  % Weight Loss:  None noted  % Intake:  </= 50% for >/= 5 days (severe malnutrition)  Subcutaneous Fat Loss:  None observed  Muscle Loss:  None observed  Fluid Retention:  None noted    Malnutrition Diagnosis: Patient does not meet two of the above criteria necessary for diagnosing malnutrition        REASON FOR ASSESSMENT  Cristal Wynn is a 83 year old female seen by Registered Dietitian for LOS      NUTRITION HISTORY  - Information obtained from EMR and patient interview.   - Diet history: Patient has not been able to take adequate PO due to gastroparesis with severe N/V which began 10/31. Per EMR, pt reported being \"afraid to eat\" in fear that she would have an emesis.  -Dietitian provided nutrition education related to gastroparesis management on 11/6.   - Supplements: Patient was ordered the clear liquid Boost Breeze supplements on 11/6. She has been unable to tolerate these supplements due, and reports that they exacerbate her N/V.       CURRENT NUTRITION ORDERS  Diet Order:     Full liquid        Current Intake/Tolerance:  -Patient is day 9 of inadequate protein/energy intakes. Gastroenterologist has discussed with pt that we may need to begin TF.  -Pt reports that she has not had an emesis x 14 hours, after having her medications adjusted. She is encouraged by this and wonders if TF will still be necessary. Pt has been able to tolerate small amounts of milk, and cream of wheat so far this AM. " "  -Will discontinue Boost Breeze supplements per pt request; replace with Boost Plus, per pt request.      PHYSICAL FINDINGS  Observed  No nutrition-related physical findings observed  Obtained from Chart/Interdisciplinary Team  None noted    ANTHROPOMETRICS  Height:[AG1.1] 5' 5\"[AG1.2]  Weight: 158 lbs 8.2 oz (71.9 kg) - 11/3[AG1.1]  Body mass index is[AG1.2] 26.32[AG1.1] kg/(m^2).[AG1.2]  Weight Status:  Overweight BMI 25-29.9  IBW: 56.8 kg  % IBW: 127%  Weight History:[AG1.1]   Wt Readings from Last 5 Encounters:   11/07/18 76.7 kg (169 lb 1.5 oz)   10/28/18 74.4 kg (164 lb 0.4 oz)   09/14/17 66.7 kg (147 lb 0.8 oz)   06/27/17 66.7 kg (147 lb 1 oz)   06/05/17 64.9 kg (143 lb)[AG1.3]       LABS  Labs reviewed    MEDICATIONS  IV reglan    Zofran, PO       ASSESSED NUTRITION NEEDS PER APPROVED PRACTICE GUIDELINES:    Dosing Weight 60.5 kg (adjusted)  Estimated Energy Needs: 1224-0956 kcals (25-30 Kcal/Kg)  Justification: overweight  Estimated Protein Needs: 70-90 grams protein (1.2-1.5 g pro/Kg)  Justification: preservation of lean body mass  Estimated Fluid Needs: 6927-1585  mL (1 mL/Kcal)  Justification: maintenance    MALNUTRITION:  % Weight Loss:  None noted  % Intake:  </= 50% for >/= 5 days (severe malnutrition)  Subcutaneous Fat Loss:  None observed  Muscle Loss:  None observed  Fluid Retention:  None noted    Malnutrition Diagnosis: Patient does not meet two of the above criteria necessary for diagnosing malnutrition      NUTRITION DIAGNOSIS:  Inadequate protein-energy intake related to alterations in GI function as evidenced by inadequate oral intake x 9 days.       NUTRITION INTERVENTIONS  Recommendations / Nutrition Prescription  ADAT per MD discretion     -If TF orders are initiated:   Recommend Isosource 1.5 with a goal rate of 50 mL/hr.   This will provide 1800 Kcals (30 Kcals/kg), 82 grams protein (1.4 g/kg), 211 gm CHO, 18 gm fiber, and 900 mL free H2O.     Start TF @ 25 mL/hr, and if tolerated, " after 24 hrs advance to goal rate of 50mL/hr.     Provide free H2O flushes of 150 mL/hr q 4 hours for total fluid (TF + flushes) = ~1800 mL/day.       Implementation  Nutrition education: Provided education on gastroparesis on 11/6.  Medical Food Supplement: Send Boost Plus between meals and with meals.      Nutrition Goals  Patient will tolerate oral intake vs. FT placement in 24-48 hrs.      MONITORING AND EVALUATION:  Progress towards goals will be monitored and evaluated per protocol and Practice Guidelines    Mei Huynh Dietetic Intern[AG1.1]           Revision History        User Key Date/Time User Provider Type Action    > AG1.3 11/8/2018 10:18 AM Rudy Huynh Dietetic Intern Sign     AG1.2 11/8/2018  9:47 AM Rudy Huynh Dietetic Intern      AG1.1 11/8/2018  9:46 AM uRdy Huynhetic Intern                   Procedure Notes     No notes of this type exist for this encounter.      Progress Notes - Therapies (Notes from 11/08/18 through 11/11/18)     No notes of this type exist for this encounter.

## 2018-11-01 NOTE — IP AVS SNAPSHOT
[address] [facility]    [address] [place] [address]    Phone:  [phone]                                       After Visit Summary   11/1/2018    Cristal Wynn    MRN: [id number]           After Visit Summary Signature Page     I have received my discharge instructions, and my questions have been answered. I have discussed any challenges I see with this plan with the nurse or doctor.    ..........................................................................................................................................  Patient/Patient Representative Signature      ..........................................................................................................................................  Patient Representative Print Name and Relationship to Patient    ..................................................               ................................................  Date                                   Time    ..........................................................................................................................................  Reviewed by Signature/Title    ...................................................              ..............................................  Date                                               Time          22EPIC Rev 08/18

## 2018-11-02 NOTE — PROGRESS NOTES
RECEIVING UNIT ED HANDOFF REVIEW    ED Nurse Handoff Report was reviewed by: Theresa Santiago on November 1, 2018 at 8:42 PM

## 2018-11-02 NOTE — PHARMACY-ADMISSION MEDICATION HISTORY
Admission medication history interview status for the 11/1/2018  admission is complete. See EPIC admission navigator for prior to admission medications     Medication history source reliability:Good    Actions taken by pharmacist (provider contacted, etc):None     Additional medication history information not noted on PTA med list : Pt stated she is not on omeprazole (originally omeprazole and protonix were on the list)    Medication reconciliation/reorder completed by provider prior to medication history? Yes    Time spent in this activity: 10 min    Prior to Admission medications    Medication Sig Last Dose Taking? Auth Provider   Acetaminophen (TYLENOL PO) Take 325-650 mg by mouth every 6 hours as needed for mild pain or fever  Yes Reported, Patient   losartan-hydrochlorothiazide (HYZAAR) 100-12.5 MG per tablet Take 1 tablet by mouth every morning 11/1/2018 at Unknown time Yes Unknown, Entered By History   METOPROLOL SUCCINATE ER PO Take 25 mg by mouth At Bedtime  10/31/2018 at Unknown time Yes Reported, Patient   pantoprazole (PROTONIX) 20 MG EC tablet Take by mouth 30-60 minutes before breakfast 11/1/2018 at Unknown time Yes Allen Shoemaker MD   sucralfate (CARAFATE) 1 GM/10ML suspension Take 10 mLs (1 g) by mouth 4 times daily (before meals and nightly) 11/1/2018 at x2, 1200 Yes Allen Shoemaker MD   order for DME Equipment being ordered: Walker Wheels () and Walker ()  Treatment Diagnosis: Right total knee replacement   Constantino Andrews MD

## 2018-11-02 NOTE — CONSULTS
[facility]  Gastroenterology Consultation         Cristal Wynn  [address] [facility] [address]  83 year old female    Admission Date/Time: 11/1/2018  Primary Care Provider: Mora Bang  Referring / Attending Physician:  Self Referral    We were asked to see the patient in consultation by Self Referral for evaluation of nausea and vomiting.      CC: nausea and vomiting    HPI:  Cristal Wynn is a 83 year old female who was seen in our office on 11/2/2018 and admitted to chronic epigastric radiating to chest pain as well as generalized lower abdominal pain as well as chronic nausea and vomiting with severe anorexia. Had not eaten for nearly 7 days. Complained of feeling dehydrated. We recommended going to ED and [facility] for fluids and evaluation.  Labs done in ED revealed elevated troponins and was started on heparin. She had complains of nausea and give reglan as needed. Receiving pantoprazole and carafate. Hyoscyamine has improved lower abdominal pain. Patient states feeling much improved and is tolerating clear diet.    ROS: A comprehensive ten point review of systems was negative aside from those in mentioned in the HPI.      PAST MED HX:  I have reviewed this patient's medical history and updated it with pertinent information if needed.   Past Medical History:   Diagnosis Date     Cataracts, bilateral      Cerebral hemorrhage without late effect (H)      Coronary artery disease 11/18/14    mild non obstructive-CTa     Disorder of bone and cartilage, unspecified      Family history of heart disease      Gastro-oesophageal reflux disease      History of blood transfusion     1966, 1980, 1993     Hyperlipidaemia      Hypertension      Osteoarthrosis, unspecified whether generalized or localized, lower leg     knee     PONV (postoperative nausea and vomiting)      Right knee pain      Symptomatic cholelithiasis      Unspecified vitamin B deficiency        MEDICATIONS:    Prior to Admission Medications   Prescriptions Last Dose Informant Patient Reported? Taking?   Acetaminophen (TYLENOL PO)  Self Yes Yes   Sig: Take 325-650 mg by mouth every 6 hours as needed for mild pain or fever   METOPROLOL SUCCINATE ER PO 10/31/2018 at Unknown time Self Yes Yes   Sig: Take 25 mg by mouth At Bedtime    losartan-hydrochlorothiazide (HYZAAR) 100-12.5 MG per tablet 2018 at Unknown time Self Yes Yes   Sig: Take 1 tablet by mouth every morning   order for DME  Self No No   Sig: Equipment being ordered: Walker Wheels () and Walker ()  Treatment Diagnosis: Right total knee replacement   pantoprazole (PROTONIX) 20 MG EC tablet 2018 at Unknown time Self No Yes   Sig: Take by mouth 30-60 minutes before breakfast   sucralfate (CARAFATE) 1 GM/10ML suspension 2018 at x2, 1200 Self No Yes   Sig: Take 10 mLs (1 g) by mouth 4 times daily (before meals and nightly)      Facility-Administered Medications: None       ALLERGIES:   Allergies   Allergen Reactions     Asa [Aspirin]      Excedrin Back & [Acetaminophen-Aspirin Buffered]      Abdominal pain     Morphine        SOCIAL HISTORY:  Social History   Substance Use Topics     Smoking status: Never Smoker     Smokeless tobacco: Never Used     Alcohol use Yes      Comment: wine once a month       FAMILY HISTORY:  Family History   Problem Relation Age of Onset     Prostate Cancer Father      Alcohol/Drug Brother      1 brother  ETOH/CHF     Heart Failure Brother      HEART DISEASE Brother      Other - See Comments Sister      lyphoma no recurrence     Cancer Sister      Helicobacter Pylori Brother      Obesity Brother      C.A.D. Sister      CABG/?valve at 80       PHYSICAL EXAM:   General  Alert, oriented and comfortable  Vital Signs with Ranges  Temp: 97.6  F (36.4  C) Temp src: Oral BP: 124/70 Pulse: 70 Heart Rate: 70 Resp: 16 SpO2: 95 % O2 Device: None (Room air)         Constitutional: healthy, alert and no distress    Cardiovascular: negative, PMI normal. No lifts, heaves, or thrills. RRR. No murmurs, clicks gallops or rub  Psychiatric: mentation appears normal and affect normal/bright  Abdomen: Abdomen soft, non-tender. BS normal. No masses, organomegaly          ADDITIONAL COMMENTS:   I reviewed the patient's new clinical lab test results.   Recent Labs   Lab Test  11/02/18   0025  11/01/18   1728  10/25/18   2339   WBC  8.8  12.2*  12.8*   HGB  13.1  14.7  14.5   MCV  91  90  93   PLT  354  380  343   INR   --   1.01   --      Recent Labs   Lab Test  11/02/18   0759  11/01/18   1728  10/25/18   2339   POTASSIUM  3.0*  3.0*  4.2   CHLORIDE  105  99  105   CO2  26  26  25   BUN  18  20  22   ANIONGAP  9  13  9     Recent Labs   Lab Test  11/02/18   0012  11/01/18   1728  10/25/18   2339  12/24/16   0600  12/23/16   1415   ALBUMIN   --   3.9  4.0  2.7*  3.6   BILITOTAL   --   0.6  0.5  0.5  0.7   ALT   --   61*  23  19  30   AST   --   46*  35  16  30   PROTEIN  10*   --    --    --    --    LIPASE   --   177  153   --   639*       I reviewed the patient's new imaging results.        CONSULTATION ASSESSMENT AND PLAN:    Active Problems:    Troponin level elevated    Assessment: Cristal is a pleasant 83 year old female with a week long history of nausea treated with EGD last week and determined to have esophagitis. Treated with pantoprazole and discharged home. Nausea continued and has not been able to tolerate oral intake. Admitted through ED for elevated troponin treated with Heparin, Troponin now trending down. Treated with Reglan and Protonix. Nausea has resolved. Tolerating clear liquid diet.  Hyoscyamine has helped lower abdominal pain. Likely related to dehydration and IBS symptoms. Will continue to follow patient and will see in outpatient setting. Will need to evaluated with a colonoscopy when able to tolerate.               Tiesha Rangel PA-C, MELVINP  [facility].  Office: [phone]  Cell : [phone] (Dr. Cordova)  Cell: [phone] (Tiesha Rangel PA-C)

## 2018-11-02 NOTE — PROGRESS NOTES
RECEIVING UNIT ED HANDOFF REVIEW    ED Nurse Handoff Report was reviewed by: Daphney Wynn on November 1, 2018 at 10:03 PM

## 2018-11-02 NOTE — PROGRESS NOTES
A+Ox4 AVSS on room air. Orthostatic BP stable. Lactic 0.9. Neuros intact. LS diminished. BS hypo, flatus-. Tele SR.1 episode of emesis, pt states less pain than prior episodes and no burning, compazine given w/ relief. All peripheral pulses 2+. Up SBA.

## 2018-11-02 NOTE — PLAN OF CARE
Problem: Patient Care Overview  Goal: Plan of Care/Patient Progress Review  Outcome: No Change  A/O x 4, vss, a-febrile, denies chest pain, neuros are intact, tele SR, passing gas no bm, denies any nausea or vomiting, up to the bathroom with one assist, tolerating clear liquids, GI and cardiology following, Echocardiogram this afternoon, possible discharge to home tomorrow.

## 2018-11-02 NOTE — ED NOTES
"[facility]  ED Nurse Handoff Report    ED Chief complaint: Abdominal Pain (patient was at Dr Cordova's office for nausea vomiting and abdominal pain and sent to ED for further evaluation for abdominal and chest pain.  Throwing up all week, unable to keep anything down.  Patient was hospitalized last week for the same reason.)      ED Diagnosis:   Final diagnoses:   Gastritis without bleeding, unspecified chronicity, unspecified gastritis type   Generalized abdominal pain   Elevated troponin       Code Status: Full Code    Allergies:   Allergies   Allergen Reactions     Asa [Aspirin]      Excedrin Back & [Acetaminophen-Aspirin Buffered]      Abdominal pain     Morphine        Activity level - Baseline/Home:  Independent    Activity Level - Current:   Independent     Needed?: No    Isolation: No  Infection: Not Applicable  Bariatric?: No    Vital Signs:   Vitals:    11/01/18 1516 11/01/18 1945   BP: 140/83 135/74   Pulse: 100    Resp: 18    Temp: 97.9  F (36.6  C)    TempSrc: Oral    SpO2: 98%    Weight: 68.9 kg (152 lb)    Height: 1.651 m (5' 5\")        Cardiac Rhythm: ,        Pain level: 0-10 Pain Scale: 10    Is this patient confused?: No   Dickens - Suicide Severity Rating Scale Completed?  Yes  If yes, what color did the patient score?  White    Patient Report: Initial Complaint: Cristal Wynn is a 83 year old female with a history of CAD, GERD, Hyperlipidemia, and Hypertension who presents with abdominal pain. The patient reports that she presented to the emergency department on 10/25 for evaluation of an onset of intermittent epigastric abdominal pain with nausea and vomiting. She was admitted at that time, had an EGD as below, and was discharged on 10/28 on pantoprazole and sucralfate. After her discharge, she continued to have worsening vomiting and pain, so she presented to Dr. Cordova's office, GI, who referred her to the emergency department for further evaluation. Here in " "the ED, she notes 6-7 episodes of vomiting/day and that her last episode of vomiting was 40 minutes prior to arrival. She states the symptoms that are brining her in today are generally in the similar location to what brought her in a week ago but that today they all significantly worsened. She also endorses a generalized lower abdominal fullness/bloating sensation that is constant but improved with vomiting.  She has and epigastric and lower central chest burning sensation that is slightly worse after eating but has no alleviating factors.   New today is some pain in her neck (bilateral) that she personally attributed to the vomiting. She is concerned for dehydration as she notes she's unable to keep anything down and has not become dizzy.  She notes she's had small bowel movements daily without diarrhea.  She also notes intermittent dysuria \"for a while.\"  The patient has also become increasingly dehydrated with some dizziness and a decreased appetite. Despite a decreased PO intake, she has had large volumes of emesis. Her last bowel movement was this morning, and was small.   Focused Assessment: Resp: WNL Cardiac: C/o epigastric/abdominal pain. SR on monitor. Neuro: WNL GI: currently denies nausea  Tests Performed: basic labs, trop, INR, PTT, CXR, lipase  Abnormal Results:   Results for orders placed or performed during the hospital encounter of 11/01/18 (from the past 24 hour(s))   EKG 12 lead   Result Value Ref Range    Interpretation ECG Click View Image link to view waveform and result    CBC with platelets differential   Result Value Ref Range    WBC 12.2 (H) 4.0 - 11.0 10e9/L    RBC Count 4.69 3.8 - 5.2 10e12/L    Hemoglobin 14.7 11.7 - 15.7 g/dL    Hematocrit 42.3 35.0 - 47.0 %    MCV 90 78 - 100 fl    MCH 31.3 26.5 - 33.0 pg    MCHC 34.8 31.5 - 36.5 g/dL    RDW 12.6 10.0 - 15.0 %    Platelet Count 380 150 - 450 10e9/L    Diff Method Automated Method     % Neutrophils 76.6 %    % Lymphocytes 14.8 %    % " Monocytes 7.7 %    % Eosinophils 0.4 %    % Basophils 0.2 %    % Immature Granulocytes 0.3 %    Nucleated RBCs 0 0 /100    Absolute Neutrophil 9.3 (H) 1.6 - 8.3 10e9/L    Absolute Lymphocytes 1.8 0.8 - 5.3 10e9/L    Absolute Monocytes 0.9 0.0 - 1.3 10e9/L    Absolute Eosinophils 0.1 0.0 - 0.7 10e9/L    Absolute Basophils 0.0 0.0 - 0.2 10e9/L    Abs Immature Granulocytes 0.0 0 - 0.4 10e9/L    Absolute Nucleated RBC 0.0    Comprehensive metabolic panel   Result Value Ref Range    Sodium 138 133 - 144 mmol/L    Potassium 3.0 (L) 3.4 - 5.3 mmol/L    Chloride 99 94 - 109 mmol/L    Carbon Dioxide 26 20 - 32 mmol/L    Anion Gap 13 3 - 14 mmol/L    Glucose 88 70 - 99 mg/dL    Urea Nitrogen 20 7 - 30 mg/dL    Creatinine 1.11 (H) 0.52 - 1.04 mg/dL    GFR Estimate 47 (L) >60 mL/min/1.7m2    GFR Estimate If Black 57 (L) >60 mL/min/1.7m2    Calcium 10.0 8.5 - 10.1 mg/dL    Bilirubin Total 0.6 0.2 - 1.3 mg/dL    Albumin 3.9 3.4 - 5.0 g/dL    Protein Total 7.7 6.8 - 8.8 g/dL    Alkaline Phosphatase 85 40 - 150 U/L    ALT 61 (H) 0 - 50 U/L    AST 46 (H) 0 - 45 U/L   Lipase   Result Value Ref Range    Lipase 177 73 - 393 U/L   Troponin I (now)   Result Value Ref Range    Troponin I ES 0.127 (HH) 0.000 - 0.045 ug/L   INR   Result Value Ref Range    INR 1.01 0.86 - 1.14   Partial thromboplastin time   Result Value Ref Range    PTT 37 22 - 37 sec   Abdomen XR, 2 vw, flat and upright    Narrative    ABDOMEN TWO VIEWS  11/1/2018 6:12 PM     COMPARISON: None    HISTORY: Abdominal pain, bloating.     FINDINGS: Cholecystectomy changes noted. Abdominal bowel gas pattern  is nonspecific. There is no evidence for free intraperitoneal air.      Impression    IMPRESSION: No evidence for bowel obstruction or free air.   XR Chest 2 Views    Narrative    CHEST TWO VIEWS 11/1/2018 7:35 PM     HISTORY: Chest pain    COMPARISON: 10/26/2018    FINDINGS: Heart size and pulmonary vascularity normal. There are small  bilateral pleural effusions. Minimal  airspace opacity at the left lung  base. No pneumothorax.       Impression    IMPRESSION: Small bilateral pleural effusions. Minimal airspace  opacity at the left lung base may represent atelectasis or pneumonia.     ABDOULAYE MAYER MD       Treatments provided: hep gtt and potassium IV     Family Comments: at bedside    OBS brochure/video discussed/provided to patient/family: N/A              Name of person given brochure if not patient:               Relationship to patient:     ED Medications:   Medications   potassium chloride 10 mEq in 100 mL intermittent infusion with 10 mg lidocaine (10 mEq Intravenous New Bag 11/1/18 1855)   heparin infusion 25,000 units in 0.45% NaCl 250 mL (750 Units/hr Intravenous New Bag 11/1/18 1920)   ondansetron (ZOFRAN) injection 4 mg (4 mg Intravenous Given 11/1/18 1842)   0.9% sodium chloride BOLUS (0 mLs Intravenous Stopped 11/1/18 1920)   pantoprazole (PROTONIX) 40 mg IV push injection (40 mg Intravenous Given 11/1/18 1839)   sucralfate (CARAFATE) tablet 1 g (1 g Oral Given 11/1/18 1852)   hyoscyamine (ANASPAZ/LEVSIN) tablet 250 mcg (250 mcg Oral Given 11/1/18 1851)   heparin Loading Dose bolus dose from infusion pump 3,500 Units (3,500 Units Intravenous Given 11/1/18 1920)       Drips infusing?:  Yes    For the majority of the shift this patient was Green.   Interventions performed were none .    Severe Sepsis OR Septic Shock Diagnosis Present: No    To be done/followed up on inpatient unit:      ED NURSE PHONE NUMBER:

## 2018-11-02 NOTE — PROGRESS NOTES
"[facility]  Hospitalist Progress Note        Adolph Lopez, DO  2018        Interval History:      Patient states she is feeling better today. Discussed plan of care, verbalized understanding.          Assessment and Plan:        83F admitted with nausea and vomiting for further evaluation and treatment.     Elevated troponin in the setting of vomiting. The patient did have a troponin elevated. EKG completed without indication of ACS. Likely 2/2 demand ischemia. Heparin started by the emergency room physician.  - ASA 81mg daily.   - Echocardiogram ordered to assess for reduced EF or WMA.   - Metoprolol.   - Consider cardiology consultation as clinically indicated.     Acute esophagitis, as well as chronic gastritis.  This has worsened by patient's nonsteroidal intake.    - Continue Carafate   - IV proton pump inhibitors.   - GI Following.     Vomiting, dehydration, and hypokalemia.    - Monitor and replete.     Hypertension.    - Continue pta losartan with hold parameters.   - Hold pta hydrochlorothiazide.     Deep venous thrombosis prophylaxis:  The patient is fully heparinized at this point.     Code: Full.     Disposition: Likely 1-2 days pending clinical course.                    Physical Exam:      Heart Rate: 70    Blood pressure 124/70, pulse 70, temperature 97.6  F (36.4  C), temperature source Oral, resp. rate 16, height 1.651 m (5' 5\"), weight 68.9 kg (152 lb), SpO2 95 %, not currently breastfeeding.    Vitals:    18 1516   Weight: 68.9 kg (152 lb)       Vital Sign Ranges  Temperature Temp  Av  F (36.7  C)  Min: 97.6  F (36.4  C)  Max: 98.6  F (37  C)   Blood pressure Systolic (24hrs), Av , Min:124 , Max:148        Diastolic (24hrs), Av, Min:64, Max:83      Pulse Pulse  Av  Min: 70  Max: 100   Respirations Resp  Av.5  Min: 16  Max: 18   Pulse oximetry SpO2  Av.3 %  Min: 95 %  Max: 98 %     Vital Signs with Ranges  Temp:  [97.6  F (36.4 "  C)-98.6  F (37  C)] 97.6  F (36.4  C)  Pulse:  [] 70  Heart Rate:  [] 70  Resp:  [16-18] 16  BP: (124-148)/(64-83) 124/70  SpO2:  [95 %-98 %] 95 %    I/O Last 3 Shifts:        I/O past 24 hours:   No intake or output data in the 24 hours ending 11/02/18 0827    GENERAL: Alert and oriented. NAD. Conversational, appropriate.   HEENT: Normocephalic. EOMI. No icterus or injection. Nares normal.   LUNGS: Clear to auscultation. No dyspnea at rest.   HEART: Regular rate. Extremities perfused.   ABDOMEN: Soft, nontender, and nondistended. Positive bowel sounds.   EXTREMITIES: No LE edema noted.   NEUROLOGIC: Moves extremities x4 on command.          Prior to Admission Medications:        Prescriptions Prior to Admission   Medication Sig Dispense Refill Last Dose     Acetaminophen (TYLENOL PO) Take 325-650 mg by mouth every 6 hours as needed for mild pain or fever   prn     losartan-hydrochlorothiazide (HYZAAR) 100-12.5 MG per tablet Take 1 tablet by mouth every morning   11/1/2018 at Unknown time     METOPROLOL SUCCINATE ER PO Take 25 mg by mouth At Bedtime    10/31/2018 at Unknown time     pantoprazole (PROTONIX) 20 MG EC tablet Take by mouth 30-60 minutes before breakfast 30 tablet 1 11/1/2018 at Unknown time     sucralfate (CARAFATE) 1 GM/10ML suspension Take 10 mLs (1 g) by mouth 4 times daily (before meals and nightly) 1200 mL 0 11/1/2018 at x2, 1200     order for DME Equipment being ordered: Walker Wheels () and Walker ()  Treatment Diagnosis: Right total knee replacement 1 each 0             Medications:        Current Facility-Administered Medications   Medication Last Rate     HEParin 650 Units/hr (11/02/18 0245)     - MEDICATION INSTRUCTIONS -       sodium chloride 100 mL/hr at 11/02/18 0017     Current Facility-Administered Medications   Medication Dose Route Frequency     metoprolol succinate (TOPROL-XL) 24 hr tablet 25 mg  25 mg Oral BID     pantoprazole (PROTONIX) IV  40 mg Intravenous  Q24H     sucralfate  1 g Oral 4x Daily AC & HS     Current Facility-Administered Medications   Medication Dose Route Frequency     acetaminophen (TYLENOL) tablet 325-650 mg  325-650 mg Oral Q6H PRN     melatonin  1 mg Oral At Bedtime PRN     metoclopramide  5 mg Oral Q6H PRN    Or     metoclopramide  5 mg Intravenous Q6H PRN     naloxone  0.1-0.4 mg Intravenous Q2 Min PRN     ondansetron  4 mg Oral Q6H PRN    Or     ondansetron  4 mg Intravenous Q6H PRN     - MEDICATION INSTRUCTIONS -   Does not apply Continuous PRN     prochlorperazine  5 mg Intravenous Q6H PRN    Or     prochlorperazine  5 mg Oral Q6H PRN    Or     prochlorperazine  12.5 mg Rectal Q12H PRN            Data:      Lab data reviewed.     Recent Labs  Lab 11/02/18  0025 11/01/18  1728   HGB 13.1 14.7   MCV 91 90    380   INR  --  1.01   NA  --  138   POTASSIUM  --  3.0*   CHLORIDE  --  99   CO2  --  26   BUN  --  20   CR  --  1.11*   ANIONGAP  --  13   DINH  --  10.0   GLC  --  88   TROPI  --  0.127*           Imaging:      Imaging data reviewed.     JUDY MejiaO.  [facility] Hospitalist  Pager [phone]
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
## 2018-11-03 NOTE — PROGRESS NOTES
[facility]    Hospitalist Progress Note    Assessment & Plan   Cristal Wynn is a 83 year old female with a history of HTN, HLP, GERD and non-obstructive CAD who was admitted on 11/1/2018 with nausea and vomiting and found to have a slight troponin elevation      Elevated troponin in the setting of vomiting  H/o Non-obstructive CAD   The patient did have a troponin elevated. Has a history of mild non-obstructive CAD via CTa.  An EKG on presentation did not show any indication of ACS. Suspect this is likely 2/2 demand ischemia. Heparin started by the emergency room physician.  - ASA 81mg daily  - Continue Heparin drip for now   - PTA Toprol XL 25 mg   - Echocardiogram results pending.  If normal will discontinue heparin.  If abnormal will consult cardiology       Acute esophagitis, as well as chronic gastritis  N/V  This was worsened by patient's nonsteroidal intake.    - Continue Carafate   - IV proton pump inhibitors  - PRN Zofran, Reglan and Compazine   - GI Following     Dehydration, and hypokalemia.    - Monitor and replete as needed      Hypertension  Well controlled at this time   - Continue pta losartan with hold parameters  - Hold PTA hydrochlorothiazide due to dehydration     DVT Prophylaxis: Heparin drip for now   Code Status: Full Code    Disposition: Expected discharge in 1-2 days once tolerating PO and cardiac work up unremarkable.    Mario Hale, DO  Text Page (7am to 6pm)    Interval History   Patient seen and examined.  Still having difficulty with diet and N/V.  States had a significant amount of vomiting this AM.  No pain currently.  No difficulty breathing.     -Data reviewed today: I reviewed all new labs and imaging results over the last 24 hours. I personally reviewed no images or EKG's today.    Physical Exam   Temp: 98.6  F (37  C) Temp src: Oral BP: 132/66 Pulse: 68 Heart Rate: 69 Resp: 16 SpO2: 96 % O2 Device: None (Room air)    Vitals:    11/01/18 1516 11/03/18  0537   Weight: 68.9 kg (152 lb) 71.9 kg (158 lb 8.2 oz)     Vital Signs with Ranges  Temp:  [97.4  F (36.3  C)-98.6  F (37  C)] 98.6  F (37  C)  Pulse:  [64-74] 68  Heart Rate:  [62-69] 69  Resp:  [16] 16  BP: (116-166)/(62-83) 132/66  SpO2:  [95 %-99 %] 96 %  I/O last 3 completed shifts:  In: 2709 [P.O.:400; I.V.:2309]  Out: -     Constitutional: Awake, alert, cooperative, no apparent distress  Respiratory: Clear to auscultation bilaterally, no crackles or wheezing  Cardiovascular: Regular rate and rhythm, normal S1 and S2, and no murmur noted  GI: Normal bowel sounds, soft, non-distended, non-tender  Skin/Integumen: No rashes, no cyanosis  MSK: No edema     Medications     HEParin 600 Units/hr (11/03/18 0533)     - MEDICATION INSTRUCTIONS -       sodium chloride 100 mL/hr at 11/03/18 1200       losartan  100 mg Oral Daily     metoprolol succinate (TOPROL-XL) 24 hr tablet 25 mg  25 mg Oral BID     pantoprazole  40 mg Oral QAM AC     sucralfate  1 g Oral 4x Daily AC & HS       Data     Recent Labs  Lab 11/03/18  0742 11/02/18 2009 11/02/18  0759 11/02/18  0025 11/01/18  1728   WBC 6.1  --   --  8.8 12.2*   HGB 11.8  --   --  13.1 14.7   MCV 93  --   --  91 90     --   --  354 380   INR  --   --   --   --  1.01     --  140  --  138   POTASSIUM 3.7 3.6 3.0*  --  3.0*   CHLORIDE 110*  --  105  --  99   CO2 24  --  26  --  26   BUN 9  --  18  --  20   CR 0.81  --  0.98  --  1.11*   ANIONGAP 7  --  9  --  13   DINH 8.2*  --  8.4*  --  10.0   GLC 87  --  85  --  88   ALBUMIN  --   --   --   --  3.9   PROTTOTAL  --   --   --   --  7.7   BILITOTAL  --   --   --   --  0.6   ALKPHOS  --   --   --   --  85   ALT  --   --   --   --  61*   AST  --   --   --   --  46*   LIPASE  --   --   --   --  177   TROPI  --   --  0.060*  --  0.127*       Imaging:   No results found for this or any previous visit (from the past 24 hour(s)).

## 2018-11-03 NOTE — PROVIDER NOTIFICATION
Paged hospitalist Dr. Hale : Any further interventions, another troponin draw, IV pain meds? Pt has been on and off vomiting since midnight. 0240 had localized chest pain in sternum area,pain reproducible. Given Reglan IV for nausea,success. Pains decreased

## 2018-11-03 NOTE — PLAN OF CARE
Problem: Patient Care Overview  Goal: Plan of Care/Patient Progress Review  Outcome: Improving  VSS ex. systolic 166 at 0334. A/O x 4. Lungs clear. BS present, passing gas. Up with one assist, clear liquid diet. Pt has been nauseated with on and off vomiting since midnight. IV Reglan given, success. 0240 Pt complain of localized chest pain in sternal area. Pain reproducible. No change in LOC. 0300 EKG completed - Sinus rhythm with occasional PVC's. 0323 - IV reglan given for nausea, success. Pain has decreased. Pt refused pain meds due to nausea and vomiting, no IV pain meds available. Hospitalist paged to ask if any further interventions needed.  ECHO scheduled for later today.     0630 Pt continues to have nausea, vomiting output increased. Zofran IV given. Will monitor success of intervention and pass on to AM nurse for further evaluation

## 2018-11-03 NOTE — PLAN OF CARE
Problem: Patient Care Overview  Goal: Plan of Care/Patient Progress Review  Outcome: Improving  Pt A&O. VSS. Up with SBA. Regular diet tolerated well. CMS intact. LS clear. BS active. Pt denies pain and nausea. Discharge tomorrow.

## 2018-11-03 NOTE — PLAN OF CARE
Problem: Patient Care Overview  Goal: Plan of Care/Patient Progress Review  Outcome: Improving  VSS. A/O. SBA. Hep gtt cont. Tolerating clears. No N/V. +bs/gas. Echo tomorrow. Tele SR. Denies abdo pain. CMS intact.

## 2018-11-03 NOTE — PLAN OF CARE
Problem: Patient Care Overview  Goal: Plan of Care/Patient Progress Review  Outcome: No Change  A & O x 4.  VSS.  Complains of nausea; zofran effective.  Clear liquids; poor appetite.  BS+, flatus+.  Assist x 1.  Tele: NSR.  CMS intact.  Heparin running at 6ml/hr.  Denies pain.

## 2018-11-03 NOTE — PROGRESS NOTES
Pt has been on and off vomiting since midnight. 0240 Pt complain of localized chest pain in sternal area. Pain reproducible. 0300 EKG completed - Sinus rhythm with occasional PVC's. 0323 - IV reglan given for nausea, success. Pain has decreased.

## 2018-11-04 NOTE — PROGRESS NOTES
"[facility]  Hospitalist Progress Note  Reed Mg MD  11/04/2018    Assessment & Plan   Cristal Wynn is a 83 year old female with a history of HTN, HLP, GERD and non-obstructive CAD who was admitted on 11/1/2018 with nausea and vomiting and found to have a slight troponin elevation      Elevated troponin in the setting of vomiting  H/o Non-obstructive CAD   The patient did have a troponin elevated. Has a history of mild non-obstructive CAD via CTa.  An EKG on presentation did not show any indication of ACS. Suspect this is likely 2/2 demand ischemia. Heparin started by the emergency room physician.  - ASA 81mg daily  - discontinued heparin as symptoms not cardiac  - PTA Toprol XL 25 mg   - Echocardiogram results show normal LVEF and no WMA.        Acute esophagitis, as well as chronic gastritis  N/V - suspected gastroparesis  This was worsened by patient's nonsteroidal intake.    - Continue Carafate   - IV proton pump inhibitors  - PRN Zofran and Compazine   - GI Following and discussed, will need colonoscopy soon  - plan for NM gastric emptying study on 11/5      Dehydration, and hypokalemia.    - Monitor and replete as needed       Hypertension  Well controlled at this time   - Continue pta losartan with hold parameters  - Hold PTA hydrochlorothiazide due to dehydration      DVT Prophylaxis: PCD    Code Status: Full Code     Disposition: Expected discharge     Interval History   -- chart reviewed  -- discusse with GI  -- had large volume emesis this am, unable to keep things down    -Data reviewed today: I reviewed all new labs and imaging over the last 24 hours. I personally reviewed no images or EKG's today.    Physical Exam   Heart Rate: 66, Blood pressure 127/64, pulse 66, temperature 98.2  F (36.8  C), temperature source Oral, resp. rate 16, height 1.651 m (5' 5\"), weight 71.9 kg (158 lb 8.2 oz), SpO2 93 %, not currently breastfeeding.  Vitals:    11/01/18 1516 11/03/18 0537 "   Weight: 68.9 kg (152 lb) 71.9 kg (158 lb 8.2 oz)     Vital Signs with Ranges  Temp:  [97.4  F (36.3  C)-98.3  F (36.8  C)] 98.2  F (36.8  C)  Pulse:  [62-69] 66  Heart Rate:  [66-69] 66  Resp:  [16] 16  BP: (117-151)/(63-82) 127/64  SpO2:  [91 %-95 %] 93 %  I/O's Last 24 hours  I/O last 3 completed shifts:  In: 0   Out: 1100 [Urine:200; Emesis/NG output:900]    Constitutional:   no apparent distress  Respiratory: Clear to auscultation bilaterally, no crackles or wheezing  Cardiovascular: Regular rate and rhythm, normal S1 and S2, and no murmur noted  GI: hypoactive bowel sounds, soft, slightly distended,   Skin/Integumen: No rashes, no cyanosis, no edema  Other:      Medications   All medications were reviewed.    - MEDICATION INSTRUCTIONS -       sodium chloride 100 mL/hr at 11/04/18 1200       losartan  100 mg Oral Daily     metoprolol succinate (TOPROL-XL) 24 hr tablet 25 mg  25 mg Oral BID     pantoprazole  40 mg Oral QAM AC     sucralfate  1 g Oral 4x Daily AC & HS        Data     Recent Labs  Lab 11/03/18  0742 11/02/18 2009 11/02/18  0759 11/02/18  0025 11/01/18  1728   WBC 6.1  --   --  8.8 12.2*   HGB 11.8  --   --  13.1 14.7   MCV 93  --   --  91 90     --   --  354 380   INR  --   --   --   --  1.01     --  140  --  138   POTASSIUM 3.7 3.6 3.0*  --  3.0*   CHLORIDE 110*  --  105  --  99   CO2 24  --  26  --  26   BUN 9  --  18  --  20   CR 0.81  --  0.98  --  1.11*   ANIONGAP 7  --  9  --  13   DINH 8.2*  --  8.4*  --  10.0   GLC 87  --  85  --  88   ALBUMIN  --   --   --   --  3.9   PROTTOTAL  --   --   --   --  7.7   BILITOTAL  --   --   --   --  0.6   ALKPHOS  --   --   --   --  85   ALT  --   --   --   --  61*   AST  --   --   --   --  46*   LIPASE  --   --   --   --  177   TROPI  --   --  0.060*  --  0.127*       Recent Results (from the past 24 hour(s))   XR Abdomen 2 Views    Narrative    ABDOMEN TWO VIEWS  11/4/2018  9:06 AM     HISTORY: Emesis.    COMPARISON: 11/1/2018       Impression    IMPRESSION: Normal bowel gas pattern, no free air. Bilateral pleural  effusions. Right total hip arthroplasty.    MD Reed SHAVER MD  Text Page  (7am to 6pm)

## 2018-11-04 NOTE — PROGRESS NOTES
[facility]  Gastroenterology Progress Note     Cristal Wynn MRN# [id number]   YOB: 1935 Age: 83 year old          Assessment and Plan:     Troponin level elevated  Refractory nausea and vomiting.  Patient's recent upper GI endoscopy showed esophagitis and gastritis patient has been on proton pump inhibitor for gastroesophageal reflux patient was hospitalized at this time with elevated troponins patient has nonocclusive disease.  Patient did very well during the day this morning patient had another bouts of vomiting.  I strongly suspect patient findings are quite suspicious for gastroparesis.  I will recommend her to be evaluated with gastric emptying study.  In the meantime continue on full liquid diet and IV Protonix twice a day.  Patient probably will need a promotility agent.  Patient also history of chronic constipation I will recommend to monitor closely her symptoms of constipation.  Patient did acknowledge a small bowel movement this morning.            Gastritis without bleeding, unspecified chronicity, unspecified gastritis type  Generalized abdominal pain  Elevated troponin      Interval History:                   Review of Systems:   C: NEGATIVE for fever, chills, change in weight  E/M: NEGATIVE for ear, mouth and throat problems  R: NEGATIVE for significant cough or SOB  CV: NEGATIVE for chest pain, palpitations or peripheral edema             Medications:   I have reviewed this patient's current medications    losartan  100 mg Oral Daily     metoprolol succinate (TOPROL-XL) 24 hr tablet 25 mg  25 mg Oral BID     pantoprazole  40 mg Oral QAM AC     sucralfate  1 g Oral 4x Daily AC & HS                  Physical Exam:   Vitals were reviewed  Vital Signs with Ranges  Temp:  [97.4  F (36.3  C)-98.6  F (37  C)] 98.2  F (36.8  C)  Pulse:  [62-69] 62  Heart Rate:  [66-69] 66  Resp:  [16] 16  BP: (117-151)/(63-82) 117/63  SpO2:  [91 %-96 %] 93 %                  Data:   I  reviewed the patient's new clinical lab test results.   Recent Labs   Lab Test  11/03/18   0742  11/02/18   0025  11/01/18   1728   WBC  6.1  8.8  12.2*   HGB  11.8  13.1  14.7   MCV  93  91  90   PLT  303  354  380   INR   --    --   1.01     Recent Labs   Lab Test  11/03/18   0742  11/02/18 2009 11/02/18   0759  11/01/18   1728   POTASSIUM  3.7  3.6  3.0*  3.0*   CHLORIDE  110*   --   105  99   CO2  24   --   26  26   BUN  9   --   18  20   ANIONGAP  7   --   9  13     Recent Labs   Lab Test  11/02/18   0012  11/01/18   1728  10/25/18   2339  12/24/16   0600  12/23/16   1415   ALBUMIN   --   3.9  4.0  2.7*  3.6   BILITOTAL   --   0.6  0.5  0.5  0.7   ALT   --   61*  23  19  30   AST   --   46*  35  16  30   PROTEIN  10*   --    --    --    --    LIPASE   --   177  153   --   639*       I reviewed the patient's new imaging results.    All laboratory data reviewed  All imaging studies reviewed by me.    London Cordova MD,  11/4/2018  [facility]  Office : [phone]  Cell: [phone]

## 2018-11-04 NOTE — PLAN OF CARE
Problem: Patient Care Overview  Goal: Plan of Care/Patient Progress Review  Outcome: Improving  VSS. A/O x 4. Lungs clear. BS present, passing flatus. Denies pain, nausea treated with IV Zofran, success. Regular diet, up with one assist. IV leaked, IV removed, Fluids stopped for now, resource nurse called for new IV placement, unsuccessful. Probable discharge this AM, evaluation for another IV placement to be passed onto AM nurse. Tele:  with 1st degree AVB.

## 2018-11-04 NOTE — PLAN OF CARE
Problem: Patient Care Overview  Goal: Plan of Care/Patient Progress Review  Outcome: No Change  A & O x 4.  VSS.   Denies pain or nausea at this time tolerated diet has BM at this shift.  BS+, flatus+.  Assist x 1.  Tele: NSR.  CMS intact.  Walk in the hallway plan to discharge pending continue to monitor.

## 2018-11-04 NOTE — PROGRESS NOTES
[facility]  Gastroenterology Progress Note     Cristal Wynn MRN# [id number]   YOB: 1935 Age: 83 year old          Assessment and Plan:     Troponin level elevated  Clinically doing very well had one episode of vomiting this morning after clear liquids patient is not complaining of any other significant problems.  Patient is planning to have supper.  Patient has been tolerating oral reasonably well today.  Patient has minimally elevated cardiac functions.  Patient is being evaluated cardiology.  Discussed with Dr. Hale if patient is able to tolerate p.o. today I will recommend her to be discharged tomorrow with follow-up in GI clinic closely.  However if patient develops recurrent vomiting then I will recommend her to be evaluated with gastric emptying study while in the hospital.  Repeat labs tomorrow.            Gastritis without bleeding, unspecified chronicity, unspecified gastritis type  Generalized abdominal pain  Elevated troponin      Interval History:   doing well; no cp, sob, n/v/d, or abd pain.              Review of Systems:   C: NEGATIVE for fever, chills, change in weight  E/M: NEGATIVE for ear, mouth and throat problems  R: NEGATIVE for significant cough or SOB  CV: NEGATIVE for chest pain, palpitations or peripheral edema             Medications:   I have reviewed this patient's current medications    losartan  100 mg Oral Daily     metoprolol succinate (TOPROL-XL) 24 hr tablet 25 mg  25 mg Oral BID     pantoprazole  40 mg Oral QAM AC     sucralfate  1 g Oral 4x Daily AC & HS                  Physical Exam:   Vitals were reviewed  Vital Signs with Ranges  Temp:  [97.4  F (36.3  C)-98.6  F (37  C)] 98.3  F (36.8  C)  Pulse:  [65-69] 67  Heart Rate:  [66-69] 66  Resp:  [16] 16  BP: (119-151)/(66-82) 137/75  SpO2:  [91 %-96 %] 93 %     Constitutional: healthy, alert and no distress   Cardiovascular: negative, PMI normal. No lifts, heaves, or thrills. RRR. No  murmurs, clicks gallops or rub  Respiratory: negative, Percussion normal. Good diaphragmatic excursion. Lungs clear  Head: Normocephalic. No masses, lesions, tenderness or abnormalities  Neck: Neck supple. No adenopathy. Thyroid symmetric, normal size,, Carotids without bruits.  Abdomen: Abdomen soft, non-tender. BS normal. No masses, organomegaly           Data:   I reviewed the patient's new clinical lab test results.   Recent Labs   Lab Test  11/03/18   0742  11/02/18   0025  11/01/18   1728   WBC  6.1  8.8  12.2*   HGB  11.8  13.1  14.7   MCV  93  91  90   PLT  303  354  380   INR   --    --   1.01     Recent Labs   Lab Test  11/03/18   0742  11/02/18   2009  11/02/18   0759  11/01/18   1728   POTASSIUM  3.7  3.6  3.0*  3.0*   CHLORIDE  110*   --   105  99   CO2  24   --   26  26   BUN  9   --   18  20   ANIONGAP  7   --   9  13     Recent Labs   Lab Test  11/02/18   0012  11/01/18   1728  10/25/18   2339  12/24/16   0600  12/23/16   1415   ALBUMIN   --   3.9  4.0  2.7*  3.6   BILITOTAL   --   0.6  0.5  0.5  0.7   ALT   --   61*  23  19  30   AST   --   46*  35  16  30   PROTEIN  10*   --    --    --    --    LIPASE   --   177  153   --   639*       I reviewed the patient's new imaging results.    All laboratory data reviewed  All imaging studies reviewed by me.    London Cordova MD,  11/4/2018  [facility]  Office : [phone]  Cell: [phone]

## 2018-11-05 NOTE — PROVIDER NOTIFICATION
Updated GI of gastric emptying results. New orders for clear liquid diet, and scheduled reglan. Continue to monitor.

## 2018-11-05 NOTE — PLAN OF CARE
Problem: Patient Care Overview  Goal: Plan of Care/Patient Progress Review  Outcome: No Change  Patient is A&O, VSS on RA, NPO, SBA to the bathroom, IVF, and denies pain. No complaints of nausea during this shift.  Will continue to monitor

## 2018-11-05 NOTE — PLAN OF CARE
Problem: Patient Care Overview  Goal: Plan of Care/Patient Progress Review  Outcome: No Change  A/Ox4. SBA with walker, GB. IVF. Denies pain. Zofran and Compazine x1. Emesis prior to Gastric Emptying Procedure x 1. K replacement. I&O. NPO. Continue to monitor.

## 2018-11-05 NOTE — PLAN OF CARE
Problem: Patient Care Overview  Goal: Plan of Care/Patient Progress Review  Outcome: Improving  Patient c/o felling of distention and fullness after clear liquids. Tele NSR. Aware of NPO after MN. Progressing .

## 2018-11-05 NOTE — PROGRESS NOTES
[facility]    Hospitalist Progress Note :     Cumulative Summary: Cristal Wynn is a 83 year old female with past medical history significant for coronary artery disease on CT angiogram, osteoarthritis, essential hypertension, bilateral cataracts and history of brain hemorrhage who was admitted on 11/1/2018 when she presented with recurrent vomiting with chest pain and elevated troponin.  Patient was admitted for further evaluation and management.  Her EKG was not indicative of acute coronary syndrome, initially she was a started on heparin infusion along with aspirin and metoprolol.  She was also continued on IV proton pump inhibitor and gastroenterology was also consulted.  Patient was evaluated by gastroenterology for generalized abdominal chronic epigastric pain associated with chronic nausea, vomiting and severe anorexia.  Initially patient is started to show improvement in her GI symptoms but then started to have more bouts of vomiting.  GI was a strongly suspicious for gastroparesis.  They recommended stopping patient Reglan and patient to be evaluated with gastric emptying study.    Assessment & Plan     Active Problems:  Chronic epigastric discomfort, associated with nausea and vomiting  Anorexia  Acute esophagitis as well as chronic gastritis  Severe gastroparesis: Patient underwent gastric emptying study this morning.  Discussed the results with patient and HER 2 daughters this afternoon.  Patient was found to have severe delayed gastric emptying with food retention of 100% at 1 hour, 95% at 2 hours, 93% at 3-hour and 88% at 4-hour as compared to normal range of 30-90% at 1 hour, less than 60% at 2 hours, less than 30% and 3 upper and less than 10% at 4 hours.    --Continue to monitor patient closely.  --Will decrease IV fluids to 50 cc/h, discussed with family regarding need to wean patient off IV fluids due to the risk of fluid overload.  --Start patient on Reglan 10 mg before  meals and at the nighttime.  --Continue patient on Protonix 40 mg every morning.  --We will follow up on gastroenterology recommendation regarding --patient symptoms of severe gastroparesis, if they will recommend any other interventions.  --Will follow up on GI recommendation if patient should be continued on Carafate 1 g 4 times a day.    Elevated troponin in the setting of vomiting  History of nonobstructive coronary artery disease:   Patient presented with elevated troponin, does have history of mild nonobstructive coronary artery disease via CTA.  EKG on admission did not show any indications of ACS, suspecting that her troponin elevation is secondary to demand ischemia secondary to her gastric issues.  Initially she was a started on heparin infusion and aspirin.  Now patient is off heparin infusion once her echocardiogram showed normal left ventricular ejection fraction and no wall motion abnormalities.  Essential hypertension:    --Continue aspirin 81 mg p.o. daily.  --Continue PTA Toprol-XL 25 mg p.o. daily.  --Continue patient on losartan 100 mg p.o. Daily.  --Continue to hold her home dose of hydrochlorothiazide, Might not be the best choice for patient considering her risk for dehydration and hypokalemia.    DVT Prophylaxis: Pneumatic Compression Devices  Code Status: Full Code    Disposition: Expected discharge in in a day to 2 days once patient is okay to keep the oral intake.  Daughters are very concerned about patient generalized weakness, so far patient has not been evaluated by physical and occupational therapy, will consult physical and Occupational Therapy.  Patient lives alone at home I will also go ahead and place a consult for  and care coordinator.    Yaima Mccartney MD, FACP  Text Page (7am - 6pm)      Interval History   Patient care was assumed this morning, patient was seen and examined. Patient was seen earlier at that time she was about to go down for gastric emptying study,  patient was seen later in the afternoon also.  Family was also present in the room, reviewed results of gastric emptying study concerning for severe gastroparesis.  Family has multiple concerns regarding patient being able to stay off of IV fluids, her creatinine, possible low urine output, presence of pleural effusion on her last chest x-ray and considering patient living alone if she would be able to live by herself after the hospitalization.  All the questions were answered I also encouraged them to go over the plan with gastroenterology moving forward they would also open to the idea of rehab if patient will qualify.    -Data reviewed today: I reviewed all new labs and imaging results over the last 24 hours.    I personally reviewed no images or EKG's today.    Physical Exam   Temp: 97.8  F (36.6  C) Temp src: Oral BP: 148/75 Pulse: 74 Heart Rate: 87 Resp: 16 SpO2: 91 % O2 Device: None (Room air)    Vitals:    11/01/18 1516 11/03/18 0537 11/05/18 0418   Weight: 68.9 kg (152 lb) 71.9 kg (158 lb 8.2 oz) 71.7 kg (158 lb)     Vital Signs with Ranges  Temp:  [97.6  F (36.4  C)-98.2  F (36.8  C)] 97.8  F (36.6  C)  Pulse:  [62-74] 74  Heart Rate:  [68-87] 87  Resp:  [16-18] 16  BP: (117-148)/(60-75) 148/75  SpO2:  [91 %-94 %] 91 %  I/O last 3 completed shifts:  In: 900 [I.V.:900]  Out: 1100 [Urine:200; Emesis/NG output:900]    GENERAL: Alert , awake and oriented. NAD. Conversational, appropriate.   HEENT: Normocephalic. EOMI. No icterus or injection. Nares normal.   LUNGS: Clear to auscultation. No dyspnea at rest.   HEART: Regular rate. Extremities perfused.   ABDOMEN: Soft, nontender, and nondistended. Positive bowel sounds.   EXTREMITIES: No LE edema noted.   NEUROLOGIC: Moves extremities x4 on command. No acute focal neurologic abnormalities noted.     Medications     - MEDICATION INSTRUCTIONS -       sodium chloride 100 mL/hr at 11/04/18 1638       losartan  100 mg Oral Daily     metoprolol succinate (TOPROL-XL)  24 hr tablet 25 mg  25 mg Oral BID     pantoprazole  40 mg Oral QAM AC     sucralfate  1 g Oral 4x Daily AC & HS       Data     Recent Labs  Lab 11/05/18  0651 11/03/18  0742 11/02/18 2009 11/02/18  0759 11/02/18  0025 11/01/18  1728   WBC 7.8 6.1  --   --  8.8 12.2*   HGB 12.2 11.8  --   --  13.1 14.7   MCV 94 93  --   --  91 90    303  --   --  354 380   INR  --   --   --   --   --  1.01    141  --  140  --  138   POTASSIUM 3.3* 3.7 3.6 3.0*  --  3.0*   CHLORIDE 110* 110*  --  105  --  99   CO2 24 24  --  26  --  26   BUN 7 9  --  18  --  20   CR 0.70 0.81  --  0.98  --  1.11*   ANIONGAP 7 7  --  9  --  13   DINH 8.2* 8.2*  --  8.4*  --  10.0   GLC 83 87  --  85  --  88   ALBUMIN  --   --   --   --   --  3.9   PROTTOTAL  --   --   --   --   --  7.7   BILITOTAL  --   --   --   --   --  0.6   ALKPHOS  --   --   --   --   --  85   ALT  --   --   --   --   --  61*   AST  --   --   --   --   --  46*   LIPASE  --   --   --   --   --  177   TROPI  --   --   --  0.060*  --  0.127*       Imaging:   No results found for this or any previous visit (from the past 24 hour(s)).

## 2018-11-05 NOTE — PROGRESS NOTES
[facility]  Gastroenterology Progress Note     Cristal Wynn MRN# [id number]   YOB: 1935 Age: 83 year old          Assessment and Plan:     Troponin level elevated    Nausea with vomiting- Patient vomited entire meal on 11/4/2018 a.m. States nausea has resolved now.  Had been on clears and now NPO for gastric emptying study. Has gastric emptying study today. Concern for gastroparesis.  Complains of heartburn sensation worse since vomiting on 11/4/2018. Currently receiving oral Protonix 40 mg daily will have continue. May consider GI cocktail pending gastric emptying study results. Last bowel movement 2 days ago- complains of very mild bloating in abdomen. If no bowel movement by tomorrow a.m. Will add miralax daily.          Gastritis without bleeding, unspecified chronicity, unspecified gastritis type  Generalized abdominal pain  Elevated troponin      Interval History:   no new complaints, doing well, denies shortness of breath, denies abdominal pain, alert, oriented to person, place and time and doing well; no cp, sob, n/v/d, or abd pain.              Review of Systems:   C: NEGATIVE for fever, chills, change in weight  E/M: NEGATIVE for ear, mouth and throat problems  R: NEGATIVE for significant cough or SOB  CV: NEGATIVE for chest pain, palpitations or peripheral edema             Medications:   I have reviewed this patient's current medications    losartan  100 mg Oral Daily     metoprolol succinate (TOPROL-XL) 24 hr tablet 25 mg  25 mg Oral BID     pantoprazole  40 mg Oral QAM AC     sucralfate  1 g Oral 4x Daily AC & HS                  Physical Exam:   Vitals were reviewed  Vital Signs with Ranges  Temp:  [97.6  F (36.4  C)-98.2  F (36.8  C)] 97.8  F (36.6  C)  Pulse:  [62-74] 74  Heart Rate:  [68-87] 87  Resp:  [16-18] 16  BP: (117-148)/(60-75) 148/75  SpO2:  [91 %-94 %] 91 %  I/O last 3 completed shifts:  In: 900 [I.V.:900]  Out: 1100 [Urine:200; Emesis/NG  output:900]  Constitutional: healthy, alert and mild distress   Cardiovascular: negative, PMI normal. No lifts, heaves, or thrills. RRR. No murmurs, clicks gallops or rub  Respiratory: negative, Percussion normal. Good diaphragmatic excursion. Lungs clear  Abdomen: Abdomen soft, non-tender. BS normal. No masses, organomegaly           Data:   I reviewed the patient's new clinical lab test results.   Recent Labs   Lab Test  11/05/18   0651  11/03/18   0742  11/02/18   0025  11/01/18   1728   WBC  7.8  6.1  8.8  12.2*   HGB  12.2  11.8  13.1  14.7   MCV  94  93  91  90   PLT  291  303  354  380   INR   --    --    --   1.01     Recent Labs   Lab Test  11/05/18   0651  11/03/18   0742  11/02/18 2009 11/02/18   0759   POTASSIUM  3.3*  3.7  3.6  3.0*   CHLORIDE  110*  110*   --   105   CO2  24  24   --   26   BUN  7  9   --   18   ANIONGAP  7  7   --   9     Recent Labs   Lab Test  11/02/18   0012  11/01/18   1728  10/25/18   2339  12/24/16   0600  12/23/16   1415   ALBUMIN   --   3.9  4.0  2.7*  3.6   BILITOTAL   --   0.6  0.5  0.5  0.7   ALT   --   61*  23  19  30   AST   --   46*  35  16  30   PROTEIN  10*   --    --    --    --    LIPASE   --   177  153   --   639*       I reviewed the patient's new imaging results.    All laboratory data reviewed  All imaging studies reviewed by me.    Tiesha Rangel PA-C,  11/5/2018  [facility]  Office : [phone]  Cell: [phone] (Dr. Cordova)  Cell: [phone] (Tiesha Rangel PA-C)

## 2018-11-06 NOTE — PLAN OF CARE
Problem: Patient Care Overview  Goal: Plan of Care/Patient Progress Review  OT: Order received, chart reviewed, eval attempted but pt not available at this time. Per RN, pt needing new IV and reports it will be awhile.

## 2018-11-06 NOTE — PROGRESS NOTES
11/06/18 1118   Quick Adds   Type of Visit Initial Occupational Therapy Evaluation   Living Environment   Lives With alone   Living Arrangements house   Home Accessibility bed and bath are not on the first floor;tub/shower is not walk in   Number of Stairs to Enter Home 2   Number of Stairs Within Home 15   Transportation Available car   Living Environment Comment daughter plans to stay with pt for week.    Self-Care   Equipment Currently Used at Home cane, straight;shower chair   Activity/Exercise/Self-Care Comment uses cane for mobility    Functional Level Prior   Ambulation 0-->independent   Transferring 0-->independent   Toileting 0-->independent   Bathing 0-->independent   Dressing 0-->independent   Eating 0-->independent   Communication 0-->understands/communicates without difficulty   Swallowing 0-->swallows foods/liquids without difficulty   Cognition 0 - no cognition issues reported   Fall history within last six months no   Prior Functional Level Comment has been using cane    General Information   Onset of Illness/Injury or Date of Surgery - Date 11/01/18   Referring Physician Sherrill   Patient/Family Goals Statement return home   Additional Occupational Profile Info/Pertinent History of Current Problem gastroparesis with nausea and vomiting   Precautions/Limitations fall precautions   Cognitive Status Examination   Orientation orientation to person, place and time   Level of Consciousness alert   Able to Follow Commands WNL/WFL   Personal Safety (Cognitive) WNL/WFL   Memory intact   Visual Perception   Visual Perception Wears glasses   Sensory Examination   Sensory Quick Adds No deficits were identified   Pain Assessment   Patient Currently in Pain Yes, see Vital Sign flowsheet  (5/10)   Integumentary/Edema   Integumentary/Edema no deficits were identifed   Posture   Posture not impaired   Range of Motion (ROM)   ROM Quick Adds No deficits were identified   Strength   Manual Muscle Testing Quick Adds No  "deficits were identified   Muscle Tone Assessment   Muscle Tone Quick Adds No deficits were identified   Coordination   Upper Extremity Coordination No deficits were identified   Transfer Skill: Bed to Chair/Chair to Bed   Level of Climax: Bed to Chair stand-by assist   Transfer Skill: Sit to Stand   Level of Climax: Sit/Stand contact guard   Transfer Skill: Toilet Transfer   Level of [place]: Toilet stand-by assist   Assistive Device grab bars   Upper Body Dressing   Level of [place]: Dress Upper Body independent   Lower Body Dressing   Level of [place]: Dress Lower Body independent   Toileting   Level of [place]im[place]: Toilet stand-by assist   Grooming   Level of Climax: Grooming stand-by assist   Eating/Self Feeding   Level of Climax: Eating independent   Instrumental Activities of Daily Living (IADL)   Previous Responsibilities meal prep;housekeeping;laundry;shopping;yardwork;medication management;finances;driving   Activities of Daily Living Analysis   Impairments Contributing to Impaired Activities of Daily Living balance impaired;pain   General Therapy Interventions   Planned Therapy Interventions ADL retraining;transfer training   Clinical Impression   Criteria for Skilled Therapeutic Interventions Met yes, treatment indicated   OT Diagnosis dec I with ADL's and transfers   Influenced by the following impairments pain, dec ax tolerance   Assessment of Occupational Performance 1-3 Performance Deficits   Identified Performance Deficits toilet transfer, tub transfer   Clinical Decision Making (Complexity) Low complexity   Therapy Frequency daily   Predicted Duration of Therapy Intervention (days/wks) 2 days   Anticipated Discharge Disposition Home with Assist;   Risks and Benefits of Treatment have been explained. Yes   Patient, Family & other staff in agreement with plan of care Yes   [facility] AM-PAC TM \"6 Clicks\"   2016, Trustees of [facility], under " "license to [employer].  All rights reserved.   6 Clicks Short Forms Daily Activity Inpatient Short Form   [facility] AM-PAC  \"6 Clicks\" Daily Activity Inpatient Short Form   1. Putting on and taking off regular lower body clothing? 4 - None   2. Bathing (including washing, rinsing, drying)? 3 - A Little   3. Toileting, which includes using toilet, bedpan or urinal? 4 - None   4. Putting on and taking off regular upper body clothing? 4 - None   5. Taking care of personal grooming such as brushing teeth? 4 - None   6. Eating meals? 4 - None   Daily Activity Raw Score (Score out of 24.Lower scores equate to lower levels of function) 23   Total Evaluation Time   Total Evaluation Time (Minutes) 8     "

## 2018-11-06 NOTE — PLAN OF CARE
Problem: Patient Care Overview  Goal: Plan of Care/Patient Progress Review  Discharge Planner PT   Patient plan for discharge: home with A from family   Current status: order received, chart reviewed, eval completed, tx initiated. Pt admitted for nausea and vomiting, found to have severe gastroparesis. Pt lives alone in multi-level home with 2 steps to enter and 15 steps to bed/bath. Pt reports I with all ADL/IADL's as baseline including driving. 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 Does use cane occasionally.     Pt completed sit <> stand with CGA. 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 Pt ambulated to bathroom with SBA. No LOB but appears unbalance due to gait pattern but states baseline. Pt completed toilet transfer with SBA. CGA for tub shower transfer with grab bars. Pt stood at sink x 2 minutes for grooming tasks. Educated on POC and d/c planning. pt reports daughter is coming to stay with her until Sunday. Is open to having HHOT/PT.   Barriers to return to prior living situation: lives alone, full flight of stairs to bed/bath   Recommendations for discharge: home with A from daughter    Rationale for recommendations: Pt reports her daughter will be with her until Sunday to A with ADL/IADL's as needed. Pt reports her other daughter lives in the area and can A with some IADL's as needed.        Entered by: Christina Mascorro 11/06/2018 11:44 AM

## 2018-11-06 NOTE — PROGRESS NOTES
[facility]    Hospitalist Progress Note :     Cumulative Summary: Cristal Wynn is a 83 year old female with past medical history significant for coronary artery disease on CT angiogram, osteoarthritis, essential hypertension, bilateral cataracts and history of brain hemorrhage who was admitted on 11/1/2018 when she presented with recurrent vomiting with chest pain and elevated troponin.  Patient was admitted for further evaluation and management.  Her EKG was not indicative of acute coronary syndrome, initially she was a started on heparin infusion along with aspirin and metoprolol.  She was also continued on IV proton pump inhibitor and gastroenterology was also consulted.  Patient was evaluated by gastroenterology for generalized abdominal chronic epigastric pain associated with chronic nausea, vomiting and severe anorexia.  Initially patient is started to show improvement in her GI symptoms but then started to have more bouts of vomiting.  GI was a strongly suspicious for gastroparesis.  They recommended stopping patient Reglan and patient to be evaluated with gastric emptying study. Her gastric emptying study showed severe gastroparesis.    Assessment & Plan     Active Problems:  Chronic epigastric discomfort, associated with nausea and vomiting  Anorexia  Acute esophagitis as well as chronic gastritis  Severe gastroparesis: Patient underwent gastric emptying study yesterday , started on diet , did have 2 episodes of emesis last night at 1:00 am and one this morning . Long discussion with patient this morning regarding small and frequent meals.  Patient was found to have severe delayed gastric emptying with food retention of 100% at 1 hour, 95% at 2 hours, 93% at 3-hour and 88% at 4-hour as compared to normal range of 30-90% at 1 hour, less than 60% at 2 hours, less than 30% and 3 upper and less than 10% at 4 hours.    --Continue to monitor patient closely.  -- Stop IV fluids   -- recheck  BMP every 3rd day for couple of weeks to make sure that patient,s electrolytes are stab;e  -- Discontinue Sucralfate, but continue Protonix  -- start Zofran 8 mg Half an hour before meal TID routine   -- Continue Reglan 10 mg before meals and at the nighttime.  --Continue patient on Protonix 40 mg every morning.  -- Appreciate GI help   -- encouraged patient regarding eating habits change , per GI most likely her gastroparesis is a result of viral infection and should improve with passage of time, might takes weeks to months   -- printed patient information regarding gastroparesis and diet and gave to patient   -- posting nutritionist for further guidance regarding gastroparesis diet     Elevated troponin in the setting of vomiting  History of nonobstructive coronary artery disease:   Patient presented with elevated troponin, does have history of mild nonobstructive coronary artery disease via CTA.  EKG on admission did not show any indications of ACS, suspecting that her troponin elevation is secondary to demand ischemia secondary to her gastric issues.  Initially she was a started on heparin infusion and aspirin.  Now patient is off heparin infusion once her echocardiogram showed normal left ventricular ejection fraction and no wall motion abnormalities.  Essential hypertension:    --Continue aspirin 81 mg p.o. daily.  --Continue PTA Toprol-XL 25 mg p.o. daily.  --Continue patient on losartan 100 mg p.o. Daily.  --Continue to hold her home dose of hydrochlorothiazide, Might not be the best choice for patient considering her risk for dehydration and hypokalemia.  -- Will give her dose of IV lasix as patient weight is up from fluid resuscitation  -- Potassium supplement due to expected K loss from lasix     DVT Prophylaxis: Pneumatic Compression Devices  Code Status: Full Code    Disposition: Expected discharge tomorrow , lives alone might need rehab at discharge     Yaima Mccartney MD, FACP  Text Page (7am -  6pm)      Interval History   Patient was seen and examined. Did have one episode of emesis this am , explained to her in detail regarding pathophysiology of gastroparesis as she was more awake and being alone she was able to ask questions.  Discussed regarding sticking with gastroparesis diet to see if she starts to make improvement in the coming days     -Data reviewed today: I reviewed all new labs and imaging results over the last 24 hours.    I personally reviewed no images or EKG's today.    Physical Exam   Temp: 97.9  F (36.6  C) Temp src: Oral BP: 147/84 Pulse: 86 Heart Rate: 85 Resp: 16 SpO2: 93 % O2 Device: None (Room air)    Vitals:    11/03/18 0537 11/05/18 0418 11/06/18 0653   Weight: 71.9 kg (158 lb 8.2 oz) 71.7 kg (158 lb) 74.6 kg (164 lb 8 oz)     Vital Signs with Ranges  Temp:  [97.9  F (36.6  C)-98.9  F (37.2  C)] 97.9  F (36.6  C)  Pulse:  [86] 86  Heart Rate:  [78-91] 85  Resp:  [16-18] 16  BP: (122-149)/(65-86) 147/84  SpO2:  [92 %-93 %] 93 %  I/O last 3 completed shifts:  In: 2091 [P.O.:440; I.V.:1651]  Out: 1450 [Urine:550; Emesis/NG output:900]    GENERAL: Alert , awake and oriented. NAD. Conversational, appropriate.   HEENT: Normocephalic. EOMI. No icterus or injection. Nares normal.   LUNGS: Clear to auscultation. No dyspnea at rest.   HEART: Regular rate. Extremities perfused.   ABDOMEN: Soft, nontender, and nondistended. Positive bowel sounds.   EXTREMITIES: No LE edema noted.   NEUROLOGIC: Moves extremities x4 on command. No acute focal neurologic abnormalities noted.     Medications     - MEDICATION INSTRUCTIONS -         losartan  100 mg Oral Daily     metoclopramide  10 mg Oral 4x Daily AC & HS     metoprolol succinate (TOPROL-XL) 24 hr tablet 25 mg  25 mg Oral BID     ondansetron  8 mg Oral TID AC     pantoprazole  40 mg Oral QAM AC       Data     Recent Labs  Lab 11/06/18  0640 11/05/18  2025 11/05/18  0651 11/03/18  0742  11/02/18  0759 11/02/18  0025 11/01/18  1728   WBC  --   --   7.8 6.1  --   --  8.8 12.2*   HGB  --   --  12.2 11.8  --   --  13.1 14.7   MCV  --   --  94 93  --   --  91 90   PLT  --   --  291 303  --   --  354 380   INR  --   --   --   --   --   --   --  1.01     --  141 141  --  140  --  138   POTASSIUM 3.7 3.9 3.3* 3.7  < > 3.0*  --  3.0*   CHLORIDE 111*  --  110* 110*  --  105  --  99   CO2 24  --  24 24  --  26  --  26   BUN 10  --  7 9  --  18  --  20   CR 0.66  --  0.70 0.81  --  0.98  --  1.11*   ANIONGAP 9  --  7 7  --  9  --  13   DINH 8.3*  --  8.2* 8.2*  --  8.4*  --  10.0   GLC 84  --  83 87  --  85  --  88   ALBUMIN  --   --   --   --   --   --   --  3.9   PROTTOTAL  --   --   --   --   --   --   --  7.7   BILITOTAL  --   --   --   --   --   --   --  0.6   ALKPHOS  --   --   --   --   --   --   --  85   ALT  --   --   --   --   --   --   --  61*   AST  --   --   --   --   --   --   --  46*   LIPASE  --   --   --   --   --   --   --  177   TROPI  --   --   --   --   --  0.060*  --  0.127*   < > = values in this interval not displayed.    Imaging:   Recent Results (from the past 24 hour(s))   NM Gastric Emptying    [facility]    NUCLEAR MEDICINE GASTRIC EMPTYING  11/5/2018 2:39 PM     HISTORY: Evaluate for gastroparesis.     COMPARISON: None.    TECHNIQUE:  Patient was given Tc99M labeled sulfur colloid in food.  Immediate, one, two, three, and four hours static images over the  stomach or until less than 10% of radionuclide remains in stomach    DOSE: 1.0mci TC Sulfur Colloid in 2 eggs, toast and 1 cup water    FINDINGS:  The amount of retained activity within the stomach is as  follows-    One hour:  100 % (Normal 30-90%)    Two hour:  95% (Normal <60%)    Three hour:  93 % (Normal <30%)    Four hour:  88 % (Normal <10%)      Impression    IMPRESSION:  Severe delayed gastric emptying.    ELIF PABLO MD

## 2018-11-06 NOTE — PROGRESS NOTES
11/06/18 1300   Quick Adds   Type of Visit Initial PT Evaluation   Living Environment   Lives With alone   Living Arrangements house   Home Accessibility bed and bath are not on the first floor;tub/shower is not walk in   Number of Stairs to Enter Home 2   Number of Stairs Within Home 15   Stair Railings at Home inside, present on right side;outside, present on right side   Transportation Available car   Living Environment Comment daughter plans to stay with pt for week.    Self-Care   Regular Exercise yes   Activity/Exercise Type biking   Equipment Currently Used at Home cane, straight;shower chair   Activity/Exercise/Self-Care Comment uses cane for mobility    Functional Level Prior   Ambulation 0-->independent   Transferring 0-->independent   Toileting 0-->independent   Bathing 0-->independent   Dressing 0-->independent   Eating 0-->independent   Communication 0-->understands/communicates without difficulty   Swallowing 0-->swallows foods/liquids without difficulty   Cognition 0 - no cognition issues reported   Fall history within last six months no   Which of the above functional risks had a recent onset or change? none   Prior Functional Level Comment has been using cane    General Information   Onset of Illness/Injury or Date of Surgery - Date 11/05/18   Referring Physician Yaima Mccartney   Patient/Family Goals Statement pt plans on dc home   Pertinent History of Current Problem (include personal factors and/or comorbidities that impact the POC) Chronic epigastric discomfort, associated with nausea and vomiting   Precautions/Limitations no known precautions/limitations   Weight-Bearing Status - LUE full weight-bearing   Weight-Bearing Status - RUE full weight-bearing   Weight-Bearing Status - LLE full weight-bearing   Weight-Bearing Status - RLE full weight-bearing   Cognitive Status Examination   Orientation orientation to person, place and time   Level of Consciousness alert   Follows Commands and Answers  "Questions 100% of the time   Personal Safety and Judgment intact   Memory intact   Pain Assessment   Patient Currently in Pain Yes, see Vital Sign flowsheet  (4/10)   Range of Motion (ROM)   ROM Quick Adds No deficits were identified   Strength   Strength Comments generalized weakness assosciated with decreased activity with knee pain   Bed Mobility   Bed Mobility Comments bed mob I   Transfer Skills   Transfer Comments transfers SBA   Gait   Gait Comments amb with no AD with CGA mild [id number]   Balance   Balance Comments impaired standing dynamic balance requires U to B UE support to maintain, increased trunk lat motion with amb   General Therapy Interventions   Planned Therapy Interventions bed mobility training;gait training;strengthening;home program guidelines   Clinical Impression   Criteria for Skilled Therapeutic Intervention yes, treatment indicated   PT Diagnosis difficulty walking   Influenced by the following impairments decreased balance and strength and pain   Functional limitations due to impairments impaired functional mob I and safety   Clinical Presentation Stable/Uncomplicated   Clinical Presentation Rationale clinical judgement   Clinical Decision Making (Complexity) Low complexity   Therapy Frequency` daily   Predicted Duration of Therapy Intervention (days/wks) 1 day   Anticipated Discharge Disposition Home with Assist   Risk & Benefits of therapy have been explained Yes   Patient, Family & other staff in agreement with plan of care Yes   [facility] [employer] TM \"6 Clicks\"   2016, Trustees of [facility], under license to [employer].  All rights reserved.   6 Clicks Short Forms Basic Mobility Inpatient Short Form   [facility] AM-PAC  \"6 Clicks\" V.2 Basic Mobility Inpatient Short Form   1. Turning from your back to your side while in a flat bed without using bedrails? 4 - None   2. Moving from lying on your back to sitting on the side of a flat bed without using " bedrails? 4 - None   3. Moving to and from a bed to a chair (including a wheelchair)? 4 - None   4. Standing up from a chair using your arms (e.g., wheelchair, or bedside chair)? 4 - None   5. To walk in hospital room? 4 - None   6. Climbing 3-5 steps with a railing? 4 - None   Basic Mobility Raw Score (Score out of 24.Lower scores equate to lower levels of function) 24   Total Evaluation Time   Total Evaluation Time (Minutes) 10      11/06/18 1300   Quick Adds   Type of Visit Initial PT Evaluation   Living Environment   Lives With alone   Living Arrangements house   Home Accessibility bed and bath are not on the first floor;tub/shower is not walk in   Number of Stairs to Enter Home 2   Number of Stairs Within Home 15   Stair Railings at Home inside, present on right side;outside, present on right side   Transportation Available car   Living Environment Comment daughter plans to stay with pt for week.    Self-Care   Regular Exercise yes   Activity/Exercise Type biking   Equipment Currently Used at Home cane, straight;shower chair   Activity/Exercise/Self-Care Comment uses cane for mobility    Functional Level Prior   Ambulation 0-->independent   Transferring 0-->independent   Toileting 0-->independent   Bathing 0-->independent   Dressing 0-->independent   Eating 0-->independent   Communication 0-->understands/communicates without difficulty   Swallowing 0-->swallows foods/liquids without difficulty   Cognition 0 - no cognition issues reported   Fall history within last six months no   Which of the above functional risks had a recent onset or change? none   Prior Functional Level Comment has been using cane    General Information   Onset of Illness/Injury or Date of Surgery - Date 11/05/18   Referring Physician Yaima Mccartney   Patient/Family Goals Statement pt plans on dc home   Pertinent History of Current Problem (include personal factors and/or comorbidities that impact the POC) Chronic epigastric discomfort,  associated with nausea and vomiting   Precautions/Limitations no known precautions/limitations   Weight-Bearing Status - LUE full weight-bearing   Weight-Bearing Status - RUE full weight-bearing   Weight-Bearing Status - LLE full weight-bearing   Weight-Bearing Status - RLE full weight-bearing   Cognitive Status Examination   Orientation orientation to person, place and time   Level of Consciousness alert   Follows Commands and Answers Questions 100% of the time   Personal Safety and Judgment intact   Memory intact   Pain Assessment   Patient Currently in Pain Yes, see Vital Sign flowsheet  (4/10)   Range of Motion (ROM)   ROM Quick Adds No deficits were identified   Strength   Strength Comments generalized weakness assosciated with decreased activity with knee pain   Bed Mobility   Bed Mobility Comments bed mob I   Transfer Skills   Transfer Comments transfers SBA   Gait   Gait Comments amb with no AD with CGA mild [id number]   Balance   Balance Comments impaired standing dynamic balance requires U to B UE support to maintain, increased trunk lat motion with amb   General Therapy Interventions   Planned Therapy Interventions bed mobility training;gait training;strengthening;home program guidelines   Clinical Impression   Criteria for Skilled Therapeutic Intervention yes, treatment indicated   PT Diagnosis difficulty walking   Influenced by the following impairments decreased balance and strength and pain   Functional limitations due to impairments impaired functional mob I and safety   Clinical Presentation Stable/Uncomplicated   Clinical Presentation Rationale clinical judgement   Clinical Decision Making (Complexity) Low complexity   Therapy Frequency` daily   Predicted Duration of Therapy Intervention (days/wks) 1 day   Anticipated Discharge Disposition Home with Assist   Risk & Benefits of therapy have been explained Yes   Patient, Family & other staff in agreement with plan of care Yes   [facility]  "AM-PAC TM \"6 Clicks\"   2016, Trustees of [facility], under license to [employer].  All rights reserved.   6 Clicks Short Forms Basic Mobility Inpatient Short Form   [facility]-PAC  \"6 Clicks\" V.2 Basic Mobility Inpatient Short Form   1. Turning from your back to your side while in a flat bed without using bedrails? 4 - None   2. Moving from lying on your back to sitting on the side of a flat bed without using bedrails? 4 - None   3. Moving to and from a bed to a chair (including a wheelchair)? 4 - None   4. Standing up from a chair using your arms (e.g., wheelchair, or bedside chair)? 4 - None   5. To walk in hospital room? 4 - None   6. Climbing 3-5 steps with a railing? 4 - None   Basic Mobility Raw Score (Score out of 24.Lower scores equate to lower levels of function) 24   Total Evaluation Time   Total Evaluation Time (Minutes) 10     "

## 2018-11-06 NOTE — PLAN OF CARE
Problem: Patient Care Overview  Goal: Plan of Care/Patient Progress Review  Outcome: No Change  Pt up with SBA voiding in the bathroom. Pt x1 emesis this shift. Managing nausea with Reglan and Zofran. Denies pain. Will continue to monitor.

## 2018-11-06 NOTE — CONSULTS
NUTRITION EDUCATION    REASON FOR ASSESSMENT:  RN consult - Gastroparesis diet, MD requesting Magic cups & suggestions for meals    CURRENT DIET:  Full liquids    NUTRITION DIAGNOSIS:  Food- and nutrition-related knowledge deficit R/t no prior exposure to the information AEB new dx gastroparesis      INTERVENTIONS:    Nutrition Prescription:  Recommend low fat, low fiber diet    Implementation:    Nutrition Education (Content):  a) Provided handout on diet for gastroparesis  b) Discussed foods allowed, foods to avoid; nutritional supplements    Nutrition Education (Application):  a) Discussed current eating habits and recommended alternative food choices    Anticipate good compliance    Diet Education - refer to Education Flowsheet    Goals:    Patient verbalizes understanding of  The diet     All of the above goals met during the education session    Follow Up/Monitoring:    Provided RD contact information for future questions.  Recommend Out-Patient Nutrition Referral, if further diet instructions are needed.    Lou Dumont RD  Pager [phone] (M-F)            [phone] (W/E & Hol)

## 2018-11-06 NOTE — PLAN OF CARE
Problem: Patient Care Overview  Goal: Plan of Care/Patient Progress Review  Outcome: No Change  Pt A/O X4. VSS on RA. CMS intact. IVF. Standby assist. Full liquid diet. 2 episodes of emesis @ 0100. Pt denying pain. Continue to monitor.

## 2018-11-06 NOTE — PROGRESS NOTES
[facility]  Gastroenterology Progress Note     Cristal Wynn MRN# [id number]   YOB: 1935 Age: 83 year old          Assessment and Plan:     Troponin level elevated   Gastroparesis- Patient had gastric emptying study and revealed severe delayed gastric emptying.  Has tolerated full liquid diet this morning. Vomited prior to. Have started on Reglan. Will monitor symptoms today. IF continues to tolerate full liquid diet through today, likely can be discharged tomorrow.             Gastritis without bleeding, unspecified chronicity, unspecified gastritis type  Generalized abdominal pain  Elevated troponin      Interval History:   doing well, denies chest pain, denies shortness of breath, denies abdominal pain, up and ambulating, alert, oriented to person, place and time and doing well; no cp, sob, n/v/d, or abd pain.              Review of Systems:   C: NEGATIVE for fever, chills, change in weight  E/M: NEGATIVE for ear, mouth and throat problems  R: NEGATIVE for significant cough or SOB  CV: NEGATIVE for chest pain, palpitations or peripheral edema             Medications:   I have reviewed this patient's current medications    losartan  100 mg Oral Daily     metoclopramide  10 mg Oral 4x Daily AC & HS     metoprolol succinate (TOPROL-XL) 24 hr tablet 25 mg  25 mg Oral BID     ondansetron  8 mg Oral TID AC     pantoprazole  40 mg Oral QAM AC     potassium chloride  20 mEq Oral Once                  Physical Exam:   Vitals were reviewed  Vital Signs with Ranges  Temp:  [97.9  F (36.6  C)-98.9  F (37.2  C)] 97.9  F (36.6  C)  Pulse:  [86] 86  Heart Rate:  [78-91] 85  Resp:  [16-18] 16  BP: (122-149)/(65-86) 147/84  SpO2:  [92 %-93 %] 93 %  I/O last 3 completed shifts:  In: 2091 [P.O.:440; I.V.:1651]  Out: 1450 [Urine:550; Emesis/NG output:900]  Constitutional: healthy, alert and no distress   Cardiovascular: negative, PMI normal. No lifts, heaves, or thrills. RRR. No murmurs, clicks  gallops or rub  Respiratory: negative, Percussion normal. Good diaphragmatic excursion. Lungs clear  Abdomen: Abdomen soft, non-tender. BS normal. No masses, organomegaly           Data:   I reviewed the patient's new clinical lab test results.   Recent Labs   Lab Test  11/05/18   0651  11/03/18   0742  11/02/18   0025  11/01/18   1728   WBC  7.8  6.1  8.8  12.2*   HGB  12.2  11.8  13.1  14.7   MCV  94  93  91  90   PLT  291  303  354  380   INR   --    --    --   1.01     Recent Labs   Lab Test  11/06/18   0640  11/05/18   2025  11/05/18   0651  11/03/18   0742   POTASSIUM  3.7  3.9  3.3*  3.7   CHLORIDE  111*   --   110*  110*   CO2  24   --   24  24   BUN  10   --   7  9   ANIONGAP  9   --   7  7     Recent Labs   Lab Test  11/02/18   0012  11/01/18   1728  10/25/18   2339  12/24/16   0600  12/23/16   1415   ALBUMIN   --   3.9  4.0  2.7*  3.6   BILITOTAL   --   0.6  0.5  0.5  0.7   ALT   --   61*  23  19  30   AST   --   46*  35  16  30   PROTEIN  10*   --    --    --    --    LIPASE   --   177  153   --   639*       I reviewed the patient's new imaging results.    All laboratory data reviewed  All imaging studies reviewed by me.    Tiesha Rangel PA-C,  11/6/2018  [facility]  Office : [phone]  Cell: [phone] (Dr. Cordova)  Cell: [phone] (Tiesha Rangel PA-C)

## 2018-11-06 NOTE — PLAN OF CARE
Problem: Patient Care Overview  Goal: Plan of Care/Patient Progress Review  Discharge Planner PT   Patient plan for discharge: home with dtr to A until Sunday  Current status: PT eval and treatment completed.  Pt admitted with gastric infection. Previously I at her own home with 2 steps to enter and 15 to bedroom.  Pt currently I with ADL'S, I bed mob, mod I with FWW for tranfers and amb, less steady without A.D. Due to knee pain and previous hip dysfunction post MARIE.  Pt agrees to use her FWW at home and has one on each level.  Pt able to complete 22 steps with 1 rail and SBA/mod I.  Pt feels comfortable with discharge to home and will cont her HEP for LE strengthening.  Plans on delaying TKA due to GI issues.  Barriers to return to prior living situation: none  Recommendations for discharge: home with several day A from dtr, cont HEP  Rationale for recommendations: pt met goals for safe discharge to home       Entered by: BINH COVINGTON 11/06/2018 2:20 PM     Physical Therapy Discharge Summary    Reason for therapy discharge:    All goals and outcomes met, no further needs identified.    Progress towards therapy goal(s). See goals on Care Plan in [facility] electronic health record for goal details.  Goals met    Therapy recommendation(s):    Continue home exercise program.

## 2018-11-06 NOTE — PLAN OF CARE
Problem: Patient Care Overview  Goal: Plan of Care/Patient Progress Review  Alert and oriented x 4. Abdomen soft distended. BS hypoactive. Reports feeling full. Denies nausea.  Full liquid diet. Had one emesis this shift.  Patient unable to tolerate last IV of K, recheck K was 3.9  Ambulated in hallway. Up with assist of 1. Voids in bathroom. Denies pain.

## 2018-11-07 NOTE — PROGRESS NOTES
[facility]  Gastroenterology Progress Note     Cristal Wynn MRN# [id number]   YOB: 1935 Age: 83 year old          Assessment and Plan:     Troponin level elevated  Gastroparesis- Has continued to vomit multiple times over night and c/o tenderness in oral cavity.  Not keeping oral medication down. Will switch reglan to IV and add IV azithromycin 250mg daily. Will need to keep on full liquid diet. To keep head of bed elevated.  Will switch protonix to IV.  Concern about nutrition will check protein levels with CMP this afternoon.            Gastritis without bleeding, unspecified chronicity, unspecified gastritis type  Generalized abdominal pain  Elevated troponin      Interval History:   denies chest pain, denies shortness of breath, up and ambulating, alert, oriented to person, place and time and doing well; no cp, sob, n/v/d, or abd pain.              Review of Systems:   C: NEGATIVE for fever, chills, change in weight  E/M: NEGATIVE for ear, mouth and throat problems  R: NEGATIVE for significant cough or SOB  CV: NEGATIVE for chest pain, palpitations or peripheral edema             Medications:   I have reviewed this patient's current medications    azithromycin  250 mg Intravenous Q24H     losartan  100 mg Oral Daily     metoclopramide (REGLAN) in 50 mL 0.9% sodium chloride infusion  10 mg Intravenous Q6H     metoprolol succinate (TOPROL-XL) 24 hr tablet 25 mg  25 mg Oral BID     ondansetron  8 mg Oral TID AC     pantoprazole  40 mg Oral QAM AC     potassium chloride  20 mEq Oral Once                  Physical Exam:   Vitals were reviewed  Vital Signs with Ranges  Temp:  [97.6  F (36.4  C)-98.2  F (36.8  C)] 97.6  F (36.4  C)  Pulse:  [90-91] 91  Heart Rate:  [90-98] 98  Resp:  [16] 16  BP: (140-157)/(73-81) 147/73  SpO2:  [90 %-92 %] 91 %  I/O last 3 completed shifts:  In: -   Out: 2600 [Urine:1600; Emesis/NG output:1000]  Constitutional: healthy, alert and no distress    Cardiovascular: negative, PMI normal. No lifts, heaves, or thrills. RRR. No murmurs, clicks gallops or rub  Respiratory: negative, Percussion normal. Good diaphragmatic excursion. Lungs clear  Abdomen: Abdomen soft, non-tender. BS normal. No masses, organomegaly, positive findings: tenderness mild epigastric           Data:   I reviewed the patient's new clinical lab test results.   Recent Labs   Lab Test  11/05/18   0651  11/03/18   0742  11/02/18   0025  11/01/18   1728   WBC  7.8  6.1  8.8  12.2*   HGB  12.2  11.8  13.1  14.7   MCV  94  93  91  90   PLT  291  303  354  380   INR   --    --    --   1.01     Recent Labs   Lab Test  11/07/18   0638  11/06/18   0640  11/05/18   2025  11/05/18   0651   POTASSIUM  3.2*  3.7  3.9  3.3*   CHLORIDE  106  111*   --   110*   CO2  28  24   --   24   BUN  14  10   --   7   ANIONGAP  12  9   --   7     Recent Labs   Lab Test  11/02/18   0012  11/01/18   1728  10/25/18   2339  12/24/16   0600  12/23/16   1415   ALBUMIN   --   3.9  4.0  2.7*  3.6   BILITOTAL   --   0.6  0.5  0.5  0.7   ALT   --   61*  23  19  30   AST   --   46*  35  16  30   PROTEIN  10*   --    --    --    --    LIPASE   --   177  153   --   639*       I reviewed the patient's new imaging results.    All laboratory data reviewed  All imaging studies reviewed by me.    Tiesha Rangel PA-C,  11/7/2018  [facility]  Office : [phone]  Cell: [phone] (Dr. Cordova)  Cell: [phone] (Tiesha Rangel PA-C)

## 2018-11-07 NOTE — PLAN OF CARE
Problem: Patient Care Overview  Goal: Plan of Care/Patient Progress Review  Discharge Planner OT   Patient plan for discharge: home with A from family   Current status:  pt completed supine to sit EOB independent SBA sit to stand and amb with FWW to/from bathroom, toilet transfer with RTS mod I with clothing management, tub transfer with grab bars SBA, SBA standing at sink for ADLS, independent doff/don slipper socks  Barriers to return to prior living situation: lives alone, full flight of stairs to bed/bath   Recommendations for discharge: home with A from daughter per plan established by the Occupational THerapist   Rationale for recommendations: Pt reports her daughter will be with her until Sunday to A with ADL/IADL's as needed. Pt reports her other daughter lives in the area and can A with some IADL's as needed.        Entered by: Nuris Barrett 11/07/2018 10:21 AM

## 2018-11-07 NOTE — PROGRESS NOTES
Spoke with Dr. Mccartney regarding K replacement.  Took 40 mEq this AM but states cannot take anymore pills due to nausea. No need for more K replacement at this time.

## 2018-11-07 NOTE — PLAN OF CARE
Problem: Patient Care Overview  Goal: Plan of Care/Patient Progress Review  Outcome: No Change  Patient A/Ox4. Up with SBA. Full liquid diet tolerating with fair appetite. Emesis x2 tonight Zofran given for nausea with improvement. BS hypoactive, passing gas, no BM this shift. Abdomen soft but distended. Denies any pain. Discharge pending patient ability to tolerate full liquid diet. Continue to monitor.

## 2018-11-07 NOTE — PLAN OF CARE
Alert and oriented x 4. Full liquid diet.  Zofran and Reglan scheduled. BS hypoactive, passing gas, abdomen soft distended. Had one emesis. Voids in bathroom.

## 2018-11-07 NOTE — PROGRESS NOTES
[facility]    Hospitalist Progress Note :     Cumulative Summary: Cristal Wynn is a 83 year old female with past medical history significant for coronary artery disease on CT angiogram, osteoarthritis, essential hypertension, bilateral cataracts and history of brain hemorrhage who was admitted on 11/1/2018 when she presented with recurrent vomiting with chest pain and elevated troponin.  Patient was admitted for further evaluation and management.  Her EKG was not indicative of acute coronary syndrome, initially she was a started on heparin infusion along with aspirin and metoprolol.  She was also continued on IV proton pump inhibitor and gastroenterology was also consulted.  Patient was evaluated by gastroenterology for generalized abdominal chronic epigastric pain associated with chronic nausea, vomiting and severe anorexia.  Initially patient is started to show improvement in her GI symptoms but then started to have more bouts of vomiting.  GI was a strongly suspicious for gastroparesis.  They recommended stopping patient Reglan and patient to be evaluated with gastric emptying study. Her gastric emptying study showed severe gastroparesis.  Patient was a started on routine IV Zofran along with Reglan oral 4 times a day but continued to have significant emesis.    Assessment & Plan     Active Problems:  Chronic epigastric discomfort, associated with nausea and vomiting: Most likely secondary to severe gastroparesis, continues to have multiple episodes of vomiting in the last 24 hours was not able to keep given oral Reglan down.  Patient was evaluated by gastroenterology again this morning.  Anorexia  Acute esophagitis as well as chronic gastritis  Severe gastroparesis: Patient underwent gastric emptying study yesterday , started on diet , did have 2 episodes of emesis last night at 1:00 am and one this morning . Long discussion with patient this morning regarding small and frequent  meals.  Patient was found to have severe delayed gastric emptying with food retention of 100% at 1 hour, 95% at 2 hours, 93% at 3-hour and 88% at 4-hour as compared to normal range of 30-90% at 1 hour, less than 60% at 2 hours, less than 30% and 3 upper and less than 10% at 4 hours.    --Continue to monitor patient closely.  --Long discussion with patient and her daughter regarding plan of care, all the questions were answered.  --We will give patient only one bag of D5 0.2 saline with 20 of KCl as patient has been vomiting constantly and has not been able to keep up with good oral intake and her sodium is a starting to go slightly high.  --She is off sucralfate.  --GI has change the Protonix to IV.  -- Reglan is changed to 10 mg IV every 6 hours.  --Patient is also started on azithromycin 250 mg IV every 24 hours to help with gastroparesis.  --Appreciate gastroenterology help they have also discussion with patient and family regarding possible need for NJ tube placement if patient is not able to make any clinical improvement in the next 24-48 hours.  --Appreciate nutrition help, patient has been given a lot of information regarding gastroparesis diet by me and the nutrition.  --Check BMP tomorrow morning.  -- getting K replaced , K loss sec to persistent vomiting    Elevated troponin in the setting of vomiting  History of nonobstructive coronary artery disease:   Patient presented with elevated troponin, does have history of mild nonobstructive coronary artery disease via CTA.  EKG on admission did not show any indications of ACS, suspecting that her troponin elevation is secondary to demand ischemia secondary to her gastric issues. Initially she was a started on heparin infusion and aspirin.  Now patient is off heparin infusion once her echocardiogram showed normal left ventricular ejection fraction and no wall motion abnormalities.  Essential hypertension: Patient blood pressure seems to be slightly high today, I  am concerned that she might not be able to digest her blood pressure medications due to multiple episodes of emesis.    --Continue aspirin 81 mg p.o. daily.  --We will discontinue patient oral Toprol and will start patient on Lopressor IV 5 mg every 6 hours with holding parameters.  --Continue patient on losartan 100 mg p.o. Daily  --Continue to hold her home dose of hydrochlorothiazide, Might not be the best choice for patient considering her risk for dehydration and hypokalemia.  -- Potassium supplement due to K loss from vomiting     DVT Prophylaxis: Pneumatic Compression Devices, encouraged patient to walk three times a day as she has been hospitalized for over 5 days now     Code Status: Full Code    Disposition: Discharge is pending at this point due to patient clinical improvement.  Of note patient lives alone and family has questions regarding need for rehab after the discharge especially if patient is going to require tube feeds.    Yaima Mccartney MD, FACP  Text Page (7am - 6pm)      Interval History    Patient was seen and examined this morning, daughter present at the bedside.  Patient was seen by gastroenterology and is aware about plan for changing her medications to IV as she continued to have persistent episodes of vomiting yesterday and is not even able to keep the Reglan down.  Patient and family also have multiple questions regarding tube feeds all the questions were answered.    -Data reviewed today: I reviewed all new labs and imaging results over the last 24 hours.    I personally reviewed no images or EKG's today.    Physical Exam   Temp: (P) 97.6  F (36.4  C) Temp src: (P) Oral BP: (P) 147/81 Pulse: 91 Heart Rate: (P) 82 Resp: (P) 16 SpO2: (!) (P) 88 % O2 Device: (P) None (Room air)    Vitals:    11/05/18 0418 11/06/18 0653 11/07/18 0552   Weight: 71.7 kg (158 lb) 74.6 kg (164 lb 8 oz) 76.7 kg (169 lb 1.5 oz)     Vital Signs with Ranges  Temp:  [97.6  F (36.4  C)-98.2  F (36.8  C)] (P) 97.6  F  (36.4  C)  Pulse:  [90-91] 91  Heart Rate:  [82-98] (P) 82  Resp:  [16] (P) 16  BP: (140-157)/(73-81) (P) 147/81  SpO2:  [88 %-92 %] (P) 88 %  I/O last 3 completed shifts:  In: -   Out: 2600 [Urine:1600; Emesis/NG output:1000]    GENERAL: Alert , awake and oriented. NAD. Conversational, appropriate.looks slightly tired but pleasant , daughter present at the bedside   HEENT: Normocephalic. EOMI. No icterus or injection. Nares normal.   LUNGS: Clear to auscultation. No dyspnea at rest.   HEART: Regular rate. Extremities perfused.   ABDOMEN: Soft, nontender, and nondistended. Positive bowel sounds.   EXTREMITIES: No LE edema noted.   NEUROLOGIC: Moves extremities x4 on command. No acute focal neurologic abnormalities noted.     Medications     dextrose 5% and 0.2% NaCl + KCl 20 mEq/L       - MEDICATION INSTRUCTIONS -         azithromycin  250 mg Intravenous Q24H     losartan  100 mg Oral Daily     metoclopramide (REGLAN) in 50 mL 0.9% sodium chloride infusion  10 mg Intravenous Q6H     metoprolol succinate (TOPROL-XL) 24 hr tablet 25 mg  25 mg Oral BID     ondansetron  8 mg Oral TID AC     pantoprazole (PROTONIX) IV  40 mg Intravenous Daily with breakfast     potassium chloride  20 mEq Oral Once       Data     Recent Labs  Lab 11/07/18  0638 11/06/18  0640 11/05/18  2025 11/05/18  0651 11/03/18  0742  11/02/18  0759 11/02/18  0025 11/01/18  1728   WBC  --   --   --  7.8 6.1  --   --  8.8 12.2*   HGB  --   --   --  12.2 11.8  --   --  13.1 14.7   MCV  --   --   --  94 93  --   --  91 90   PLT  --   --   --  291 303  --   --  354 380   INR  --   --   --   --   --   --   --   --  1.01   * 144  --  141 141  --  140  --  138   POTASSIUM 3.2* 3.7 3.9 3.3* 3.7  < > 3.0*  --  3.0*   CHLORIDE 106 111*  --  110* 110*  --  105  --  99   CO2 28 24  --  24 24  --  26  --  26   BUN 14 10  --  7 9  --  18  --  20   CR 0.76 0.66  --  0.70 0.81  --  0.98  --  1.11*   ANIONGAP 12 9  --  7 7  --  9  --  13   DINH 8.7 8.3*  --  8.2*  8.2*  --  8.4*  --  10.0   GLC 87 84  --  83 87  --  85  --  88   ALBUMIN  --   --   --   --   --   --   --   --  3.9   PROTTOTAL  --   --   --   --   --   --   --   --  7.7   BILITOTAL  --   --   --   --   --   --   --   --  0.6   ALKPHOS  --   --   --   --   --   --   --   --  85   ALT  --   --   --   --   --   --   --   --  61*   AST  --   --   --   --   --   --   --   --  46*   LIPASE  --   --   --   --   --   --   --   --  177   TROPI  --   --   --   --   --   --  0.060*  --  0.127*   < > = values in this interval not displayed.    Imaging:   No results found for this or any previous visit (from the past 24 hour(s)).

## 2018-11-08 NOTE — PROGRESS NOTES
RADIOLOGY PROCEDURE NOTE  Patient name: Cristal Wynn  MRN: [id number]  : 1935    Pre-procedure diagnosis: NJ tube placement requested.  Gastroparesis  Post-procedure diagnosis: Same    Procedure Date/Time: 2018  2:48 PM  Procedure: NG tube place but unable to advance into jejunum.  Estimated blood loss: None  Specimen(s) collected with description: None  The patient tolerated the procedure well with no immediate complications.  Significant findings:UNABLE TO ADVANCE TO JEJUNUM!!!  Do Not use until repeat xray on 2018.  Consider endoscopic guidance to advance the tube past the pyloric sphincter.    See imaging dictation for procedural details.    Provider name: Andrés Gar  Assistant(s):None

## 2018-11-08 NOTE — PROGRESS NOTES
[facility]    Hospitalist Progress Note :     Cumulative Summary: Cristal Wynn is a 83 year old female with past medical history significant for coronary artery disease on CT angiogram, osteoarthritis, essential hypertension, bilateral cataracts and history of brain hemorrhage who was admitted on 11/1/2018 when she presented with recurrent vomiting with chest pain and elevated troponin.  Patient was admitted for further evaluation and management.  Her EKG was not indicative of acute coronary syndrome, initially she was a started on heparin infusion along with aspirin and metoprolol.  She was also continued on IV proton pump inhibitor and gastroenterology was also consulted.  Patient was evaluated by gastroenterology for generalized abdominal chronic epigastric pain associated with chronic nausea, vomiting and severe anorexia.  Initially patient is started to show improvement in her GI symptoms but then started to have more bouts of vomiting.  GI was a strongly suspicious for gastroparesis.  They recommended stopping patient Reglan and patient to be evaluated with gastric emptying study. Her gastric emptying study showed severe gastroparesis.  Patient was a started on routine IV Zofran along with Reglan oral 4 times a day but continued to have significant emesis.    Assessment & Plan     Active Problems:  Chronic epigastric discomfort, associated with nausea and vomiting: Most likely secondary to severe gastroparesis, continues to have multiple episodes of vomiting in the last 24 hours was not able to keep given oral Reglan down.  Patient was evaluated by gastroenterology again this morning.  Anorexia  Acute esophagitis as well as chronic gastritis  Severe gastroparesis: Patient underwent gastric emptying study   Patient was found to have severe delayed gastric emptying with food retention of 100% at 1 hour, 95% at 2 hours, 93% at 3-hour and 88% at 4-hour as compared to normal range of 30-90%  at 1 hour, less than 60% at 2 hours, less than 30% and 3 upper and less than 10% at 4 hours.  Since then patient has been a started on gastroparesis diet with frequent oral Reglan but patient was unable to tolerate, her medications were changed to IV with patient continues to have severe nausea and vomiting.    --Continue to monitor patient closely.  --Long discussion with patient and her daughter regarding plan of care, all the questions were answered.encouraged them to consider tube feedings at least as bridging while she continues to work with oral intake   --Discussed the case with GI, they are recommending placing NG tube and then the starting patient on tube feedings.  --Due to the unclear etiology of her idiopathic gastroparesis, GI is also recommending CT enterography with contrast.  --Patient underwent to get NG tube placement via radiology but they were not able to place the tube.  --Gastroenterology might take patient back for EGD and placed the tube themselves tonight or tomorrow morning.  --As patient is unable to keep anything dominant we will give her gentle fluids at 50 cc/h.  --Continue patient on Protonix IV and IV Reglan 10 mg every 6 hours.  --Continue patient on azithromycin 250 mg IV every 24 hours to help with gastroparesis.  --Patient was evaluated by nutrition and tube feeding orders are in place if patient gets the NG tube place.  --We will check CMP and CBC tomorrow morning.    Elevated troponin in the setting of vomiting  History of nonobstructive coronary artery disease:   Patient presented with elevated troponin, does have history of mild nonobstructive coronary artery disease via CTA.  EKG on admission did not show any indications of ACS, suspecting that her troponin elevation is secondary to demand ischemia secondary to her gastric issues. Initially she was a started on heparin infusion and aspirin.  Now patient is off heparin infusion once her echocardiogram showed normal left  ventricular ejection fraction and no wall motion abnormalities.  Essential hypertension: Patient blood pressure is stable on IV lopressor , she vomited Losartan this morning     -- Continue aspirin 81 mg p.o. daily.  -- Off oral Toprol XL and continue Lopressor IV 5 mg every 6 hours with holding parameters.  -- Continue patient on losartan 100 mg p.o. Daily  -- Continue to hold her home dose of hydrochlorothiazide, Might not be the best choice for patient considering her risk for dehydration and hypokalemia.  -- Potassium supplement due to K loss from vomiting     DVT Prophylaxis: Pneumatic Compression Devices, encouraged patient to walk three times a day as she has been hospitalized for over 6 days now     Code Status: Full Code    Disposition: Discharge is pending at this point due to patient clinical improvement.  Of note patient lives alone and family has questions regarding need for rehab after the discharge especially if patient is going to require tube feeds.    Yaima Mccartney MD, FACP  Text Page (7am - 6pm)      Interval History    Patient was seen and examined, her daughter-in-law is also present in the room.  Patient did have an episode of vomiting this morning.  The most she was able to go without vomiting for 14 hours.  Patient does have very poor oral intake and is almost afraid to eat because of nausea and vomiting.  Long discussion with daughter-in-law regarding placing NG tube to address nutritional status as patient is not been able to get enough nutrition for over 7-8 days now.  Patient was now open to the idea of getting NG tube.  Also reviewed the plan of care with gastroenterology.    -Data reviewed today: I reviewed all new labs and imaging results over the last 24 hours.    I personally reviewed no images or EKG's today.    Physical Exam   Temp: 98.3  F (36.8  C) Temp src: Oral BP: 134/74 Pulse: 76 Heart Rate: 79 Resp: 16 SpO2: 90 % O2 Device: None (Room air)    Vitals:    11/05/18 0418 11/06/18  0653 11/07/18 0552   Weight: 71.7 kg (158 lb) 74.6 kg (164 lb 8 oz) 76.7 kg (169 lb 1.5 oz)     Vital Signs with Ranges  Temp:  [97.6  F (36.4  C)-99.1  F (37.3  C)] 98.3  F (36.8  C)  Pulse:  [76-84] 76  Heart Rate:  [79-90] 79  Resp:  [16] 16  BP: (130-165)/(66-81) 134/74  SpO2:  [88 %-92 %] 90 %  I/O last 3 completed shifts:  In: -   Out: 350 [Urine:350]    GENERAL: Alert , awake and oriented. NAD. Conversational, appropriate.looks slightly tired but pleasant , daughter present at the bedside   HEENT: Normocephalic. EOMI. No icterus or injection. Nares normal.   LUNGS: Clear to auscultation. No dyspnea at rest.   HEART: Regular rate. Extremities perfused.   ABDOMEN: Soft, nontender, and nondistended. Positive bowel sounds.   EXTREMITIES: No LE edema noted.   NEUROLOGIC: Moves extremities x4 on command. No acute focal neurologic abnormalities noted.     Medications     - MEDICATION INSTRUCTIONS -         [START ON 11/9/2018] azithromycin  200 mg Oral Daily     losartan  100 mg Oral Daily     metoclopramide (REGLAN) in 50 mL 0.9% sodium chloride infusion  10 mg Intravenous Q6H     metoprolol  5 mg Intravenous Q6H     ondansetron  8 mg Oral TID AC     pantoprazole (PROTONIX) IV  40 mg Intravenous Daily with breakfast     potassium chloride  20 mEq Oral Once       Data     Recent Labs  Lab 11/08/18  0634 11/07/18  0638 11/06/18  0640  11/05/18  0651 11/03/18  0742  11/02/18  0759 11/02/18  0025 11/01/18  1728   WBC  --   --   --   --  7.8 6.1  --   --  8.8 12.2*   HGB  --   --   --   --  12.2 11.8  --   --  13.1 14.7   MCV  --   --   --   --  94 93  --   --  91 90   PLT  --   --   --   --  291 303  --   --  354 380   INR  --   --   --   --   --   --   --   --   --  1.01    146* 144  --  141 141  --  140  --  138   POTASSIUM 3.6 3.2* 3.7  < > 3.3* 3.7  < > 3.0*  --  3.0*   CHLORIDE 105 106 111*  --  110* 110*  --  105  --  99   CO2 35* 28 24  --  24 24  --  26  --  26   BUN 14 14 10  --  7 9  --  18  --  20   CR  0.81 0.76 0.66  --  0.70 0.81  --  0.98  --  1.11*   ANIONGAP 4 12 9  --  7 7  --  9  --  13   DINH 8.8 8.7 8.3*  --  8.2* 8.2*  --  8.4*  --  10.0   * 87 84  --  83 87  --  85  --  88   ALBUMIN  --   --   --   --   --   --   --   --   --  3.9   PROTTOTAL  --   --   --   --   --   --   --   --   --  7.7   BILITOTAL  --   --   --   --   --   --   --   --   --  0.6   ALKPHOS  --   --   --   --   --   --   --   --   --  85   ALT  --   --   --   --   --   --   --   --   --  61*   AST  --   --   --   --   --   --   --   --   --  46*   LIPASE  --   --   --   --   --   --   --   --   --  177   TROPI  --   --   --   --   --   --   --  0.060*  --  0.127*   < > = values in this interval not displayed.    Imaging:   No results found for this or any previous visit (from the past 24 hour(s)).

## 2018-11-08 NOTE — PROGRESS NOTES
[facility]  Gastroenterology Progress Note     Cristal Wynn MRN# [id number]   YOB: 1935 Age: 83 year old          Assessment and Plan:     Troponin level elevated  Patient continues to have nausea and vomiting patient was started on Reglan and subsequently on Zithromax.  Patient did not have any vomiting overnight however patient threw up again this morning.  Patient is denying any other systemic complaint patient has been very poor intake over last week due to refractory vomiting.  Patient lab has been fairly unremarkable.  Patient has significantly delayed gastric emptying study.  I will recommend the patient to be evaluated with small bowel enteroscopy I will also recommend patient to have nasojejunal tube placement and start nasojejunal feeding after CT enterography contrast can be placed through the nasojejunal tube.  So far patient's diagnosis is idiopathic gastroparesis above-mentioned workup is to rule out any small bowel obstruction.  Differential diagnosis plan risks benefits were discussed in detail with hospitalist team and also with patient and family.  I will recommend her to have repeat labs done tomorrow.            Gastritis without bleeding, unspecified chronicity, unspecified gastritis type  Generalized abdominal pain  Elevated troponin      Interval History:   doing well; no cp, sob, n/v/d, or abd pain.              Review of Systems:   C: NEGATIVE for fever, chills, change in weight  E/M: NEGATIVE for ear, mouth and throat problems  R: NEGATIVE for significant cough or SOB  CV: NEGATIVE for chest pain, palpitations or peripheral edema             Medications:   I have reviewed this patient's current medications    [START ON 11/9/2018] azithromycin  200 mg Oral Daily     losartan  100 mg Oral Daily     metoclopramide (REGLAN) in 50 mL 0.9% sodium chloride infusion  10 mg Intravenous Q6H     metoprolol  5 mg Intravenous Q6H     ondansetron  8 mg Oral TID AC      pantoprazole (PROTONIX) IV  40 mg Intravenous Daily with breakfast     potassium chloride  20 mEq Oral Once     sodium chloride (PF)  3 mL Intracatheter Q8H                  Physical Exam:   Vitals were reviewed  Vital Signs with Ranges  Temp:  [97.8  F (36.6  C)-99.1  F (37.3  C)] 97.8  F (36.6  C)  Pulse:  [76-84] 76  Heart Rate:  [73-90] 73  Resp:  [16] 16  BP: (130-165)/(66-84) 138/84  SpO2:  [90 %-92 %] 91 %  I/O last 3 completed shifts:  In: -   Out: 350 [Urine:350]  Constitutional: healthy, alert and no distress   Cardiovascular: negative, PMI normal. No lifts, heaves, or thrills. RRR. No murmurs, clicks gallops or rub  Respiratory: negative, Percussion normal. Good diaphragmatic excursion. Lungs clear  Head: Normocephalic. No masses, lesions, tenderness or abnormalities  Neck: Neck supple. No adenopathy. Thyroid symmetric, normal size,, Carotids without bruits.  Abdomen: Abdomen soft, non-tender. BS normal. No masses, organomegaly           Data:   I reviewed the patient's new clinical lab test results.   Recent Labs   Lab Test  11/05/18   0651  11/03/18   0742  11/02/18   0025  11/01/18   1728   WBC  7.8  6.1  8.8  12.2*   HGB  12.2  11.8  13.1  14.7   MCV  94  93  91  90   PLT  291  303  354  380   INR   --    --    --   1.01     Recent Labs   Lab Test  11/08/18   0634  11/07/18   0638  11/06/18   0640   POTASSIUM  3.6  3.2*  3.7   CHLORIDE  105  106  111*   CO2  35*  28  24   BUN  14  14  10   ANIONGAP  4  12  9     Recent Labs   Lab Test  11/02/18   0012  11/01/18   1728  10/25/18   2339  12/24/16   0600  12/23/16   1415   ALBUMIN   --   3.9  4.0  2.7*  3.6   BILITOTAL   --   0.6  0.5  0.5  0.7   ALT   --   61*  23  19  30   AST   --   46*  35  16  30   PROTEIN  10*   --    --    --    --    LIPASE   --   177  153   --   896*       I reviewed the patient's new imaging results.    All laboratory data reviewed  All imaging studies reviewed by me.    London Cordova MD,  11/8/2018  [facility]  Office : [phone]  Cell: [phone]

## 2018-11-08 NOTE — PROGRESS NOTES
SPIRITUAL HEALTH SERVICES Progress Note  [facility]    Visit with pt and her daughter-in-law Ashleigh, per pt's length of stay.  Pt and family shared the news of pt's recent hospital stays due to GI issues.  They are anticipating a short-term feeding tube to allow pt to recover from her GI problems, with hopes that she'll be able to have knee replacement in a few months.  Pt was in generally good spirits, although she remarked that she feels exhausted by so much time in the hospital.  Provided conversation and support.  SH team will continue to be available if needs arise.                                                                                                                                                 Arlen Whitten M.A.  Staff   Pager [phone]  Phone [phone]

## 2018-11-08 NOTE — PLAN OF CARE
Alert and oriented x4. VSS. Denies pain. Up with SBA. IVf running for one bag and reassess in morning. Emesis x1 this shift. Plan to continue to monitor.

## 2018-11-08 NOTE — PROGRESS NOTES
Updated GI about NJ tube placement, radiology unable to place tube into jejunum, advised nursing staff not to use. GI will make plan and call back. Continue to pt NPO and leave tube in place, do not use.

## 2018-11-08 NOTE — PLAN OF CARE
Problem: Patient Care Overview  Goal: Plan of Care/Patient Progress Review  Discharge Planner OT   Patient plan for discharge: home with A from family   Current status: pt SBA supine to sit EOB, CGA sit to stand, pt wanting to amb into hallway to progress strength and declined ADLS or dressing at this time, pt completed 75' x 2 with FWW CGA/Edy. Pt fatigues easily with activity. Returned to bed with SBA.   Barriers to return to prior living situation: lives alone, full flight of stairs to bed/bath   Recommendations for discharge: home with A from daughter per plan established by the Occupational THerapist   Rationale for recommendations: Pt reports her daughter will be with her until Sunday to A with ADL/IADL's as needed. Pt reports her other daughter lives in the area and can A with some IADL's as needed.        Entered by: Nuris Barrett 11/08/2018 11:59 AM

## 2018-11-08 NOTE — PLAN OF CARE
Problem: Patient Care Overview  Goal: Plan of Care/Patient Progress Review  Outcome: Improving  Pt A&O. VSS on RA. Pt had 1 emesis at evening, PRN IV Zofran and scheduled Reglan given. Up with SBA. IVF D5 0.25 NS with K+ running @ 75. Discharge pending.

## 2018-11-08 NOTE — PROGRESS NOTES
"CLINICAL NUTRITION SERVICES  -  ASSESSMENT NOTE      RECOMMENDATIONS FOR MD/PROVIDER TO ORDER:   If TF orders are initiated:   Recommend Isosource 1.5 with a goal rate of 50 mL/hr. (See note below)     Recommendations Ordered by Registered Dietitian (RD):   Send Boost Plus between meals and with meals.   MALNUTRITION:  % Weight Loss:  None noted  % Intake:  </= 50% for >/= 5 days (severe malnutrition)  Subcutaneous Fat Loss:  None observed  Muscle Loss:  None observed  Fluid Retention:  None noted    Malnutrition Diagnosis: Patient does not meet two of the above criteria necessary for diagnosing malnutrition        REASON FOR ASSESSMENT  Cristal Wynn is a 83 year old female seen by Registered Dietitian for LOS      NUTRITION HISTORY  - Information obtained from EMR and patient interview.   - Diet history: Patient has not been able to take adequate PO due to gastroparesis with severe N/V which began 10/31. Per EMR, pt reported being \"afraid to eat\" in fear that she would have an emesis.  -Dietitian provided nutrition education related to gastroparesis management on 11/6.   - Supplements: Patient was ordered the clear liquid Boost Breeze supplements on 11/6. She has been unable to tolerate these supplements due, and reports that they exacerbate her N/V.       CURRENT NUTRITION ORDERS  Diet Order:     Full liquid        Current Intake/Tolerance:  -Patient is day 9 of inadequate protein/energy intakes. Gastroenterologist has discussed with pt that we may need to begin TF.  -Pt reports that she has not had an emesis x 14 hours, after having her medications adjusted. She is encouraged by this and wonders if TF will still be necessary. Pt has been able to tolerate small amounts of milk, and cream of wheat so far this AM.   -Will discontinue Boost Breeze supplements per pt request; replace with Boost Plus, per pt request.      PHYSICAL FINDINGS  Observed  No nutrition-related physical findings observed  Obtained from " "Chart/Interdisciplinary Team  None noted    ANTHROPOMETRICS  Height: 5' 5\"  Weight: 158 lbs 8.2 oz (71.9 kg) - 11/3  Body mass index is 26.32 kg/(m^2).  Weight Status:  Overweight BMI 25-29.9  IBW: 56.8 kg  % IBW: 127%  Weight History:   Wt Readings from Last 5 Encounters:   11/07/18 76.7 kg (169 lb 1.5 oz)   10/28/18 74.4 kg (164 lb 0.4 oz)   09/14/17 66.7 kg (147 lb 0.8 oz)   06/27/17 66.7 kg (147 lb 1 oz)   06/05/17 64.9 kg (143 lb)       LABS  Labs reviewed    MEDICATIONS  IV reglan    Zofran, PO       ASSESSED NUTRITION NEEDS PER APPROVED PRACTICE GUIDELINES:    Dosing Weight 60.5 kg (adjusted)  Estimated Energy Needs: 1508-9508 kcals (25-30 Kcal/Kg)  Justification: overweight  Estimated Protein Needs: 70-90 grams protein (1.2-1.5 g pro/Kg)  Justification: preservation of lean body mass  Estimated Fluid Needs: 7101-4320  mL (1 mL/Kcal)  Justification: maintenance    MALNUTRITION:  % Weight Loss:  None noted  % Intake:  </= 50% for >/= 5 days (severe malnutrition)  Subcutaneous Fat Loss:  None observed  Muscle Loss:  None observed  Fluid Retention:  None noted    Malnutrition Diagnosis: Patient does not meet two of the above criteria necessary for diagnosing malnutrition      NUTRITION DIAGNOSIS:  Inadequate protein-energy intake related to alterations in GI function as evidenced by inadequate oral intake x 9 days.       NUTRITION INTERVENTIONS  Recommendations / Nutrition Prescription  ADAT per MD discretion     -If TF orders are initiated:   Recommend Isosource 1.5 with a goal rate of 50 mL/hr.   This will provide 1800 Kcals (30 Kcals/kg), 82 grams protein (1.4 g/kg), 211 gm CHO, 18 gm fiber, and 900 mL free H2O.     Start TF @ 25 mL/hr, and if tolerated, after 24 hrs advance to goal rate of 50mL/hr.     Provide free H2O flushes of 150 mL/hr q 4 hours for total fluid (TF + flushes) = ~1800 mL/day.       Implementation  Nutrition education: Provided education on gastroparesis on 11/6.  Medical Food Supplement: " Send Boost Plus between meals and with meals.      Nutrition Goals  Patient will tolerate oral intake vs. FT placement in 24-48 hrs.      MONITORING AND EVALUATION:  Progress towards goals will be monitored and evaluated per protocol and Practice Guidelines    Mei Huynh Dietetic Intern

## 2018-11-09 NOTE — PLAN OF CARE
Problem: Patient Care Overview  Goal: Plan of Care/Patient Progress Review  Discharge Planner OT   Patient plan for discharge: home with A from family   Current status: pt completes supine to sit EOB SBA, SBA sit to stand, amb with FWW CGA, toilet transfer with RTS SBA, SBA to Edy to change depends with toileting. Cues to keep walker close and in front of self with standing at sink grooming/hygiene.   Barriers to return to prior living situation: lives alone, full flight of stairs to bed/bath   Recommendations for discharge: home with A from daughter per plan established by the Occupational THerapist   Rationale for recommendations: Pt reports her daughter will be with her until Sunday to A with ADL/IADL's as needed. Pt reports her other daughter lives in the area and can A with some IADL's as needed.          Entered by: Nuris Barrett 11/09/2018 2:01 PM       Occupational Therapy Discharge Summary    Reason for therapy discharge:    Discharged to home.    Progress towards therapy goal(s). See goals on Care Plan in [facility] electronic health record for goal details.  Goals met    Therapy recommendation(s):    No further therapy is recommended.

## 2018-11-09 NOTE — PLAN OF CARE
"Problem: Patient Care Overview  Goal: Plan of Care/Patient Progress Review  Discharge Planner PT   Patient plan for discharge: Home with dtrs/granddtr assist  Current status: New PT orders received, pt seen for PT session. Goals and POC remain appropriate. Pt's dtr present for session. Currently pt requires SBA for supine<>sit, sit<>stand to FWW, and 150' ambulation with FWW; slow but steady. Pt states she is fatigued but slowly improving.   Barriers to return to prior living situation: Decreased activity tolerance  Recommendations for discharge: Home with family assist, home exercise program  Rationale for recommendations: Discussed disch to home with pt and dtr. Pt has had family move her bed down to main level where she can stay to avoid flight of stairs; has FWW at home. Pt plans to have 24hr assist until Monday, and from there will have dtr present most of day and another dtr most of evening so pt will only be home alone 1-2 hours at a time. Pt/dtr feel confident in ability to safely care for pt at home, pt is very motivated to progress and states she is eager to return to her home exercise program. Discussed Home PT which pt is currently declining, open to OP PT \"maybe before my knee surgery\".       Entered by: Nusrat Gonzalez 11/09/2018 12:14 PM           "

## 2018-11-09 NOTE — PLAN OF CARE
Problem: Patient Care Overview  Goal: Plan of Care/Patient Progress Review  Outcome: Improving  Patient A&Ox4. VSS on 2L NC. Bowel sounds active. Denies pain. Denies Nausea & vomiting. Up with the assist of one to the bathroom, voiding adequately. Patient slept between cares. Will continue to monitor.

## 2018-11-09 NOTE — PROGRESS NOTES
EGD for gastric outlet obstruction.  Endoscopy findings were consistent with severe esophagitis involving throughout esophagus significantly distended gastric body with large amount of retained food and coffee-ground material.  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  EGD scope was advanced with difficulty across stricture stricture was dilated repeat attempt again showed persistent tight stricture.  Guidewire was passed nasojejunal tube was attempted to be passed across over the guidewire which was unsuccessful.  Subsequently Rj-Cook uncovered evolution stent was passed across the stricture with good placement with fluoroscopy of gastric content and duodenal secretions.  Total procedure time was 65-minute procedure was significantly difficult due to anatomy.  Patient did very well during procedure no immediate complication.  I will recommend the patient to be started on clear liquid diet tonight continue on current medications.  Possibly advance diet to full liquid tomorrow after evaluation in the morning.    London Cordova MD FACP  [facility] gastroenterology.

## 2018-11-09 NOTE — PROGRESS NOTES
[facility]    Hospitalist Progress Note :     Cumulative Summary: Cristal Wynn is a 83 year old female with past medical history significant for coronary artery disease on CT angiogram, osteoarthritis, essential hypertension, bilateral cataracts and history of brain hemorrhage who was admitted on 11/1/2018 when she presented with recurrent vomiting with chest pain and elevated troponin.  Patient was admitted for further evaluation and management.  Her EKG was not indicative of acute coronary syndrome, initially she was a started on heparin infusion along with aspirin and metoprolol.  She was also continued on IV proton pump inhibitor and gastroenterology was also consulted.  Patient was evaluated by gastroenterology for generalized abdominal chronic epigastric pain associated with chronic nausea, vomiting and severe anorexia.  Initially patient is started to show improvement in her GI symptoms but then started to have more bouts of vomiting.  GI was a strongly suspicious for gastroparesis. They recommended stopping patient Reglan and patient to be evaluated with gastric emptying study. Her gastric emptying study showed severe gastroparesis.  Patient was a started on routine IV Zofran along with Reglan oral 4 times a day but continued to have significant emesis.    Assessment & Plan     Active Problems:  Chronic epigastric discomfort, associated with nausea and vomiting: Most likely secondary to severe gastroparesis, patient was initially started on oral Reglan and oral azithromycin was added, as patient continued to be severely symptomatic her medications were changed to IV.  As patient not have any significant clinical improvement and continued to have multiple episodes of nausea and vomiting, GI ordered N interventional radiology but not able to place the NG tube andJ tube to be placed by interventional radiology.  GI took patient back for EGD.  Patient was found to have LA grade D reflux  esophagitis, residual food in the stomach, medium sized hiatal hernia and adult hypertrophic pyloric stenosis.  No areas attempts were made to dilate and eventually stent was placed.  Patient was a started on clear liquid diet which he tolerated well.  Patient has been evaluated by GI this morning and her diet is advanced to full liquid diet and her Reglan and Protonix is changed to oral.  Anorexia  Acute esophagitis as well as chronic gastritis  Severe gastroparesis: Patient underwent gastric emptying study   Patient was found to have severe delayed gastric emptying with food retention of 100% at 1 hour, 95% at 2 hours, 93% at 3-hour and 88% at 4-hour as compared to normal range of 30-90% at 1 hour, less than 60% at 2 hours, less than 30% and 3 upper and less than 10% at 4 hours.    Continue to monitor patient closely, tolerating the full liquid diet.  All the questions are answered for patient and her daughter.  Encourage patient to continue with small and frequent full liquid meal.  Encourage patient to do incentive spirometry.  Continue patient on oral azithromycin, Reglan and Protonix.  Appreciate gastroenterology help with the care of this nice patient, GI will be following up in 1 week.  Patient will probably need EGD with endoscopic ultrasound in couple of weeks.    Elevated troponin in the setting of vomiting  History of nonobstructive coronary artery disease:   Patient presented with elevated troponin, does have history of mild nonobstructive coronary artery disease via CTA.  EKG on admission did not show any indications of ACS, suspecting that her troponin elevation is secondary to demand ischemia secondary to her gastric issues. Initially she was a started on heparin infusion and aspirin.  Now patient is off heparin infusion once her echocardiogram showed normal left ventricular ejection fraction and no wall motion abnormalities.  Essential hypertension: her beta-blocker was changed to IV .  As patient  is able to take her oral medications might be changed back to oral.  Acute hypoxia:  Patient does not seem to be in any acute respiratory failure at this point but has been requiring 1-2 L of oxygen, has been lying in bed for last many days might be secondary to atelectasis versus some fluid overload.  Patient does not have any significant crackles on examination.    --Continue aspirin 81 mg p.o. daily.  --Discontinue IV Lopressor start patient back on Toprol-XL 25 mg p.o. daily.  --Portable chest x-ray to follow up on workup for hypoxia, patient does not have any fever or leukocytosis.  Patient does not have any cough.  --Continue patient on losartan 100 mg p.o. daily.  --Once patient is able to tolerate the medication will start patient back on hydrochlorothiazide also.  --Potassium supplement due to potassium for vomiting    DVT Prophylaxis: Pneumatic Compression Devices, encouraged patient to walk three times a day as she has been hospitalized for over 6 days now     Code Status: Full Code    Disposition: Discharge is pending at this point due to patient clinical improvement. Hopefully can be discharged tomorrow if able to tolerate the diet .    Yaima Mccartney MD, FACP  Text Page (7am - 6pm)      Interval History  :  Patient was seen and examined, daughter also present in the room.  Patient is feeling okay, still feels slightly nauseated thinks that her stomach is little hurting.  Discussed with them in detail regarding her EGD findings which are concerning for gastritis and she undergoing long procedure of getting prepyloric his stent.  Patient is definitely able to hold more food down as compared to before.  She has walked with physical and occupational therapy and at this time is hoping to be discharged home if continues to do well she is a still requiring oxygen she is denying any fever or productive cough.    -Data reviewed today: I reviewed all new labs and imaging results over the last 24 hours.    I  personally reviewed no images or EKG's today.    Physical Exam   Temp: 97.4  F (36.3  C) Temp src: Oral BP: 155/78 Pulse: 85 Heart Rate: 78 Resp: 18 SpO2: 91 % O2 Device: Nasal cannula Oxygen Delivery: 1 LPM  Vitals:    11/05/18 0418 11/06/18 0653 11/07/18 0552   Weight: 71.7 kg (158 lb) 74.6 kg (164 lb 8 oz) 76.7 kg (169 lb 1.5 oz)     Vital Signs with Ranges  Temp:  [97.4  F (36.3  C)-98.8  F (37.1  C)] 97.4  F (36.3  C)  Pulse:  [85] 85  Heart Rate:  [71-99] 78  Resp:  [13-35] 18  BP: (121-187)/() 155/78  SpO2:  [87 %-96 %] 91 %  I/O last 3 completed shifts:  In: 250 [P.O.:240; I.V.:10]  Out: 1000 [Urine:500; Emesis/NG output:500]    GENERAL: Alert , awake and oriented. NAD. Conversational, appropriate.looks slightly tired but pleasant , daughter present at the bedside   HEENT: Normocephalic. EOMI. No icterus or injection. Nares normal.   LUNGS: Clear to auscultation. No dyspnea at rest.No crackles or wheezing .   HEART: Regular rate. Extremities perfused.   ABDOMEN: Soft, nontender, and nondistended. Positive bowel sounds.   EXTREMITIES: No LE edema noted.   NEUROLOGIC: Moves extremities x4 on command. No acute focal neurologic abnormalities noted.     Medications     - MEDICATION INSTRUCTIONS -       - MEDICATION INSTRUCTIONS -       zz extemporaneous template 50 mL/hr at 11/08/18 2107       [START ON 11/10/2018] azithromycin  250 mg Oral Daily     losartan  100 mg Oral Daily     metoclopramide  10 mg Oral TID AC     metoprolol  5 mg Intravenous Q6H     ondansetron  8 mg Oral TID AC     [START ON 11/10/2018] pantoprazole  40 mg Oral QAM AC     potassium chloride  20 mEq Oral Once     sodium chloride (PF)  3 mL Intracatheter Q8H       Data     Recent Labs  Lab 11/09/18  0628 11/08/18  0634 11/07/18  0638  11/05/18  0651 11/03/18  0742   WBC 8.8  --   --   --  7.8 6.1   HGB 11.8  --   --   --  12.2 11.8   MCV 95  --   --   --  94 93     --   --   --  291 303    144 146*  < > 141 141   POTASSIUM  3.7 3.6 3.2*  < > 3.3* 3.7   CHLORIDE 106 105 106  < > 110* 110*   CO2 30 35* 28  < > 24 24   BUN 15 14 14  < > 7 9   CR 0.73 0.81 0.76  < > 0.70 0.81   ANIONGAP 5 4 12  < > 7 7   DINH 8.5 8.8 8.7  < > 8.2* 8.2*   * 114* 87  < > 83 87   ALBUMIN 2.6*  --   --   --   --   --    PROTTOTAL 5.7*  --   --   --   --   --    BILITOTAL 0.4  --   --   --   --   --    ALKPHOS 67  --   --   --   --   --    ALT 53*  --   --   --   --   --    AST 44  --   --   --   --   --    < > = values in this interval not displayed.    Imaging:   Recent Results (from the past 24 hour(s))   XR Feeding Tube Placement    Narrative    FEEDING TUBE PLACEMENT   11/8/2018 2:50 PM    HISTORY: Gastroparesis with vomiting.    COMPARISON: None    FINDINGS: 9.3 minutes of fluoroscopy were utilized for placement of a  feeding tube.  At the final position, the feeding tube tip was the  stomach. Numerous attempts were made to advance this tube beyond the  stomach without success. Dr. Moya was also consulted. He attempted in  addition without success. We left the tube in the stomach and we will  evaluate with x-ray tomorrow to see if the tube advances on its own.  35 mL of contrast was injected during the procedure.  The tube was  marked at the level of the nose at the 76 cm length.  Estimated blood  loss during the procedure was less than 5 mL. No specimens collected.  There were no complications of the procedure.    Medications used: 35 ml Omnipaque 240, 7 mL 2% Lidocaine Gel  Images Obtained: 2      Impression    IMPRESSION: Successful NG tube placement however unable to pass the  feeding tube into the jejunum. Do not use this tube..    ADITYA SHAH PA-C

## 2018-11-09 NOTE — PLAN OF CARE
Problem: Patient Care Overview  Goal: Plan of Care/Patient Progress Review  Outcome: Improving  Pt up with A1 voiding in the bathroom. No emesis this shift. Denies pain. Currently on 1L NC with sats in the 90s. Will continue to monitor.

## 2018-11-09 NOTE — PLAN OF CARE
Problem: Patient Care Overview  Goal: Plan of Care/Patient Progress Review  Pt A/Ox4, VSS on 2-3L NC, assist standby, EGD completed today, stent placed. Tolerating clears. Denies pain. will continue to monitor.

## 2018-11-09 NOTE — PROVIDER NOTIFICATION
Left message on Dr Cordova's cell number to ask if patient still needs to have the CT scan    Dr Cordova discontinued CT scan. Ok to continuo with IVFs tonight

## 2018-11-09 NOTE — PROGRESS NOTES
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 Numerous attempts made to dilate. Stent placed. Patient has tolerated clear liquids will advance to full. Will switch azithromycin, Reglan and Protonix to oral tabs. Noted to have protein of 5.7 related to lack of oral intake. Potassium is corrected and normal at 4.7. WBC 8.8.  Will likely plan discharge tomorrow and have follow-up with [facility] in one week. Will need repeat EGD with endoscopic ultrasound in 2 weeks. Need to rule out neoplastic cause for prepyloric stricture.            Gastritis without bleeding, unspecified chronicity, unspecified gastritis type  Generalized abdominal pain  Elevated troponin      Interval History:   no new complaints, doing well, denies chest pain, denies shortness of breath, denies abdominal pain, alert, oriented to person, place and time and doing well; no cp, sob, n/v/d, or abd pain.              Review of Systems:   C: NEGATIVE for fever, chills, change in weight  E/M: NEGATIVE for ear, mouth and throat problems  R: NEGATIVE for significant cough or SOB  CV: NEGATIVE for chest pain, palpitations or peripheral edema             Medications:   I have reviewed this patient's current medications    azithromycin  250 mg Oral Daily     losartan  100 mg Oral Daily     metoclopramide  10 mg Oral TID AC     metoprolol  5 mg Intravenous Q6H     ondansetron  8 mg Oral TID AC     pantoprazole  40 mg Oral QAM AC     potassium chloride  20 mEq Oral Once     sodium chloride (PF)  3 mL Intracatheter Q8H                  Physical Exam:   Vitals were reviewed  Vital Signs with Ranges  Temp:  [97.7  F (36.5  C)-98.8  F (37.1  C)] 97.9  F (36.6  C)  Pulse:  [85] 85  Heart Rate:  [71-99]  79  Resp:  [13-35] 18  BP: (121-187)/() 145/80  SpO2:  [87 %-96 %] 94 %  I/O last 3 completed shifts:  In: 250 [P.O.:240; I.V.:10]  Out: 1000 [Urine:500; Emesis/NG output:500]  Constitutional: healthy, alert and no distress   Cardiovascular: negative, PMI normal. No lifts, heaves, or thrills. RRR. No murmurs, clicks gallops or rub  Respiratory: negative, Percussion normal. Good diaphragmatic excursion. Lungs clear  Abdomen: Abdomen soft, non-tender. BS normal. No masses, organomegaly           Data:   I reviewed the patient's new clinical lab test results.   Recent Labs   Lab Test  11/09/18   0628  11/05/18   0651  11/03/18   0742   11/01/18   1728   WBC  8.8  7.8  6.1   < >  12.2*   HGB  11.8  12.2  11.8   < >  14.7   MCV  95  94  93   < >  90   PLT  339  291  303   < >  380   INR   --    --    --    --   1.01    < > = values in this interval not displayed.     Recent Labs   Lab Test  11/09/18   0628  11/08/18   0634  11/07/18   0638   POTASSIUM  3.7  3.6  3.2*   CHLORIDE  106  105  106   CO2  30  35*  28   BUN  15  14  14   ANIONGAP  5  4  12     Recent Labs   Lab Test  11/09/18   0628  11/02/18   0012  11/01/18   1728  10/25/18   2339   12/23/16   1415   ALBUMIN  2.6*   --   3.9  4.0   < >  3.6   BILITOTAL  0.4   --   0.6  0.5   < >  0.7   ALT  53*   --   61*  23   < >  30   AST  44   --   46*  35   < >  30   PROTEIN   --   10*   --    --    --    --    LIPASE   --    --   177  153   --   639*    < > = values in this interval not displayed.       I reviewed the patient's new imaging results.    All laboratory data reviewed  All imaging studies reviewed by me.    Tiesha Rangel PA-C,  11/9/2018  [facility]  Office : [phone]  Cell: [phone] (Dr. Cordova)  Cell: [phone] (Tiesha Rangel PA-C)

## 2018-11-10 NOTE — CONSULTS
Care Transition Initial Assessment -   Reason For Consult: discharge planning  Met with: Family  (daughter-in-law)  Active Problems:    Troponin level elevated       DATA  Lives With: alone  Living Arrangements: house  Description of Support System: Supportive, Involved  Who is your support system?: Children  Support Assessment: Adequate family and caregiver support.   Identified issues/concerns regarding health management:      Quality Of Family Relationships: supportive, involved  Transportation Available: car    Per care transitions consult for tcu placement on discharge.  Patient was admitted on 11-1-18 with vomiting and chest pain.  The tentative date of discharge is 11-11-18.  Reviewed chart and spoke with patient's daughter-in-law regarding discharge plans.  Per patient's daughter-in-law's report, patient lives alone in a house with 2 steps to enter and 15 steps inside.  Patient uses a straight cane at baseline.  Patient has a shower chair in the bathroom.  Patient's daughter-in-law states she is planning on flying home tomorrow and she is going to see if she can extend her ticket so she can stay in town for another week, but she is unsure if she can do that.  She is checking into things and will find out tonight if this is possible.  If not patient will have no one that can stay with her on discharge and she is asking if patient could then go to a tcu as she would not be safe at home.  She is asking for referrals to be sent to [facility].  Explained there is a $36 per day amenity fee at [facility] and patient's daughter-in-law is in agreement with this.  Referrals sent, via discharge on the double, to check bed availability.       ASSESSMENT  Cognitive Status:  Did not assess, spoke with patient's family  Concerns to be addressed: discharge planning, tcu placement on discharge.     PLAN  Financial costs for the patient includes private room amenity fees.  Patient given  options and choices for discharge TCU choices.  Patient/family is agreeable to the plan?  Yes  Patient Goals and Preferences: TBD, but likely TCU on discharge.  Patient anticipates discharging to:  TBD.    Will confirm a bed, continue to follow, and assist with a safe discharge plan.    ELIZA Ferguson, [facility]  [phone]

## 2018-11-10 NOTE — PROVIDER NOTIFICATION
Hospitalist (Dr. Mccartney ) notified of pt's abdominal CT result.     1050: Dr. Mccartney verbalized that she spoke to GI (Dr. Cordova) regarding result. GI will round on pt this afternoon.

## 2018-11-10 NOTE — PLAN OF CARE
Problem: Patient Care Overview  Goal: Plan of Care/Patient Progress Review  Discharge Planner PT   Patient plan for discharge: Home with family or TCU  Current status: Pt requires min assist for bed mobility. Pt requires CGA for toilet transfer. Pt able to ambulate 80' with FWW and CGA, very fatigued and slow with ambulation.  Barriers to return to prior living situation: Lives alone, stairs, decreased activity tolerance, level of assist, fall risk  Recommendations for discharge: TCU. However, it patient discharges home, would recommend 24 hour supervision and assist for all mobility and HHPT.  Rationale for recommendations: Pt will benefit from daily continued therapy to address impairments and increase mobility and functional independence prior to returning home.        Entered by: Kathe Carbajal 11/10/2018 2:43 PM

## 2018-11-10 NOTE — PLAN OF CARE
Problem: Patient Care Overview  Goal: Plan of Care/Patient Progress Review  Pt A/Ox4 VSS on 2l 02 , denies pain, CMS intact, Clear liquids, x ray completed today, no nausea reported, Will continue to monitor.

## 2018-11-10 NOTE — PROGRESS NOTES
SW:  D:  Received a call back from [facility].  They have no available beds.  Will need to get more choices from patient.  P:  Will continue to follow.

## 2018-11-10 NOTE — PLAN OF CARE
Problem: Patient Care Overview  Goal: Plan of Care/Patient Progress Review  Outcome: No Change  Pt A&OX4. VSS on 1 liter per NC. BAKER noted. Denies chest pain. Mild abdominal discomfort, declined medication intervention. Gave PRN iv Zofran for nausea, with some relief, also gave scheduled po Zofran and Reglan. Hypoactive BS. +flatus. Voiding. On full liquid diet. Up assist x1 with walker. Discharge pending diet tolerance.

## 2018-11-10 NOTE — PLAN OF CARE
Problem: Patient Care Overview  Goal: Plan of Care/Patient Progress Review  OT: Attempted to see pt for OT treatment, pt working with PT, will reschedule.

## 2018-11-10 NOTE — PROGRESS NOTES
[facility]    Hospitalist Progress Note :     Cumulative Summary: Cristal Wynn is a 83 year old female with past medical history significant for coronary artery disease on CT angiogram, osteoarthritis, essential hypertension, bilateral cataracts and history of brain hemorrhage who was admitted on 11/1/2018 when she presented with recurrent vomiting with chest pain and elevated troponin.  Patient was admitted for further evaluation and management.  Her EKG was not indicative of acute coronary syndrome, initially she was a started on heparin infusion along with aspirin and metoprolol.  She was also continued on IV proton pump inhibitor and gastroenterology was also consulted.  Patient was evaluated by gastroenterology for generalized abdominal chronic epigastric pain associated with chronic nausea, vomiting and severe anorexia.  Initially patient is started to show improvement in her GI symptoms but then started to have more bouts of vomiting.  GI was a strongly suspicious for gastroparesis. They recommended stopping patient Reglan and patient to be evaluated with gastric emptying study. Her gastric emptying study showed severe gastroparesis.  Patient was a started on routine IV Zofran along with Reglan oral 4 times a day but continued to have significant emesis.    Assessment & Plan     Active Problems:  Chronic epigastric discomfort, associated with nausea and vomiting: Most likely secondary to severe gastroparesis, patient was initially started on oral Reglan and oral azithromycin was added, as patient continued to be severely symptomatic her medications were changed to IV.  As patient not have any significant clinical improvement and continued to have multiple episodes of nausea and vomiting, GI ordered N interventional radiology but not able to place the NG tube andJ tube to be placed by interventional radiology.  GI took patient back for EGD.  Patient was found to have LA grade D reflux  esophagitis, residual food in the stomach, medium sized hiatal hernia and adult hypertrophic pyloric stenosis.  No areas attempts were made to dilate and eventually stent was placed.  Patient was a started on clear liquid diet which he tolerated well.  Patient has been evaluated by GI this morning and her diet is advanced to full liquid diet and her Reglan and Protonix is changed to oral.  Anorexia  Acute esophagitis as well as chronic gastritis  Severe gastroparesis: Patient underwent gastric emptying study   Patient was found to have severe delayed gastric emptying with food retention of 100% at 1 hour, 95% at 2 hours, 93% at 3-hour and 88% at 4-hour as compared to normal range of 30-90% at 1 hour, less than 60% at 2 hours, less than 30% and 3 upper and less than 10% at 4 hours.    --Continue to monitor patient closely, tolerating the full liquid diet.  -- GI has ordered the Ct scan of abdomen , will follow up on results   --Encourage patient to continue with small and frequent full liquid meal.  --Encourage patient to do incentive spirometry.  ----Continue patient on oral azithromycin, Reglan and Protonix.  Appreciate gastroenterology help with the care of this nice patient, GI will be following up in 1 week.  Patient will probably need EGD with endoscopic ultrasound in couple of weeks.    Bilateral pleural effusion , concern for acute on chronic diastolic heart failure: patient is requiring one liter of oxygen, chest xray is concerning for congestion and B/L pleural effusion although BNP is in normal range . Most likely she is fluid overloaded from fluid resuscitation when she was not able to consume any thing oral   Elevated troponin in the setting of vomiting  History of nonobstructive coronary artery disease:   Patient presented with elevated troponin, does have history of mild nonobstructive coronary artery disease via CTA.  EKG on admission did not show any indications of ACS, suspecting that her troponin  elevation is secondary to demand ischemia secondary to her gastric issues. Initially she was a started on heparin infusion and aspirin.  Now patient is off heparin infusion once her echocardiogram showed normal left ventricular ejection fraction and no wall motion abnormalities.  Essential hypertension: her beta-blocker was changed to IV .  As patient is able to take her oral medications might be changed back to oral.  Acute hypoxia:  Patient does not seem to be in any acute respiratory failure at this point but has been requiring 1-2 L of oxygen, has been lying in bed for last many days might be secondary to atelectasis versus some fluid overload.  Patient does not have any significant crackles on examination.    --Continue aspirin 81 mg p.o. daily.  -- Toprol-XL 25 mg p.o. daily.  -- Portable chest x-ray is concerning for congestion  -- lasix 20 mg po two doses today  -- recheck potassium at 18:00 , replace if low as per protocol, she might need IV replacement as she is not able to tolerate oral potassium well  -- continue to try wean patient off oxygen  -- increase activity  -- incentive spirometry  --Continue patient on losartan 100 mg p.o. daily.  -- start her back on her hydrochlorothiazide from tomorrow morning   --Potassium supplement due to potassium for vomiting    DVT Prophylaxis: Pneumatic Compression Devices, encouraged patient to walk three times a day as she has been hospitalized for over 6 days now     Code Status: Full Code    Disposition: plan to discharge patient home tomorrow as she will undergo diuresis today , plan to wean her off oxygen    Yaima Mccartney MD, FACP  Text Page (7am - 6pm)      Interval History  :  Patient was seen and examined,feeling better, minimal emesis this morning otherwise tolerating the diet.till SOB easily , sitting in chair and taking walks, we discussed about diuresing her considering her chest xray results and weaning her off oxygen today   She was in agreement    -Data  reviewed today: I reviewed all new labs and imaging results over the last 24 hours.    I personally reviewed no images or EKG's today.    Physical Exam   Temp: 97.9  F (36.6  C) Temp src: Oral BP: 131/72 Pulse: 91 Heart Rate: 91 Resp: 18 SpO2: 91 % O2 Device: Nasal cannula Oxygen Delivery: 1 LPM  Vitals:    11/06/18 0653 11/07/18 0552 11/10/18 0700   Weight: 74.6 kg (164 lb 8 oz) 76.7 kg (169 lb 1.5 oz) 72.6 kg (160 lb)     Vital Signs with Ranges  Temp:  [97.4  F (36.3  C)-97.9  F (36.6  C)] 97.9  F (36.6  C)  Pulse:  [88-91] 91  Heart Rate:  [78-91] 91  Resp:  [18-19] 18  BP: (131-171)/(72-91) 131/72  SpO2:  [91 %-93 %] 91 %  I/O last 3 completed shifts:  In: 270 [P.O.:270]  Out: 825 [Urine:625; Emesis/NG output:200]    GENERAL: Alert , awake and oriented. NAD. Conversational, appropriate.looks slightly tired but pleasant   HEENT: Normocephalic. EOMI. No icterus or injection. Nares normal.   LUNGS: Clear to auscultation. No dyspnea at rest.No crackles or wheezing .   HEART: Regular rate. Extremities perfused.   ABDOMEN: Soft, nontender, and nondistended. Positive bowel sounds.   EXTREMITIES: No LE edema noted.   NEUROLOGIC: Moves extremities x4 on command. No acute focal neurologic abnormalities noted.     Medications     - MEDICATION INSTRUCTIONS -       - MEDICATION INSTRUCTIONS -         azithromycin  250 mg Oral Daily     losartan  100 mg Oral Daily     metoclopramide  10 mg Oral TID AC     metoprolol succinate  25 mg Oral Daily     ondansetron  8 mg Oral TID AC     pantoprazole  40 mg Oral QAM AC     sodium chloride (PF)  3 mL Intracatheter Q8H       Data     Recent Labs  Lab 11/10/18  0636 11/09/18  0628 11/08/18  0634  11/05/18  0651   WBC 8.9 8.8  --   --  7.8   HGB 12.3 11.8  --   --  12.2   MCV 94 95  --   --  94    339  --   --  291    141 144  < > 141   POTASSIUM 3.8 3.7 3.6  < > 3.3*   CHLORIDE 105 106 105  < > 110*   CO2 25 30 35*  < > 24   BUN 10 15 14  < > 7   CR 0.68 0.73 0.81  < >  0.70   ANIONGAP 8 5 4  < > 7   DINH 8.4* 8.5 8.8  < > 8.2*   GLC 96 103* 114*  < > 83   ALBUMIN 2.5* 2.6*  --   --   --    PROTTOTAL 5.8* 5.7*  --   --   --    BILITOTAL 0.4 0.4  --   --   --    ALKPHOS 72 67  --   --   --    ALT 62* 53*  --   --   --    AST 51* 44  --   --   --    < > = values in this interval not displayed.    Imaging:   Recent Results (from the past 24 hour(s))   XR Chest Port 1 View    Narrative    CHEST ONE VIEW UPRIGHT 11/9/2018 5:40 PM     HISTORY: Hypoxia, concern for atelectasis versus fluid overload.     COMPARISON: 11/1/2018      Impression    IMPRESSION: Marked bibasilar effusions and/or infiltrates, question  congestive heart failure.    ELIF PABLO MD   CT Abdomen Pelvis w Contrast    Narrative    CT ABDOMEN AND PELVIS WITH CONTRAST   11/10/2018 7:26 AM     HISTORY: Abdominal pain, nausea, vomiting.     TECHNIQUE:   81 mL Isovue-[id number]. Radiation dose for this scan was  reduced using automated exposure control, adjustment of the mA and/or  kV according to patient size, or iterative reconstruction technique.    COMPARISON: 10/26/2018    FINDINGS:   There are moderate bilateral pleural effusions, increased  in size since the previous examination. Underlying  consolidation/atelectasis in both lung bases. The heart size is  normal.    There is a 3.2 cm cyst in the left hepatic lobe, unchanged. The liver  is otherwise unremarkable. The gallbladder is absent. The spleen,  pancreas, and adrenal glands are unremarkable. There are bilateral  renal cysts. A gastric stent has been placed. There is moderate  gastric wall thickening. There is no evidence of bowel obstruction.  Colonic diverticulosis is again seen. There is contrast in the colon.  Appendix not identified. Small amount of abdominal ascites. Small to  moderate amount of pelvic ascites, increased since the previous  examination. There is no free intraperitoneal air. Again noted are  areas of nodularity in the mesenteric fat. A right hip  arthroplasty  causes beam hardening artifact in the pelvis. There is a mildly  enlarged right external iliac lymph node, unchanged.         Impression    IMPRESSION:   1. Moderate bilateral pleural effusions, increased since the previous  examination.  2. Interval placement of a gastric stent. There is persistent gastric  wall thickening which could be due to inflammation but malignancy  could also cause this appearance.  3. Small amount of abdominal and moderate amount of pelvic ascites.  4. Soft tissue nodularity in the mesenteric fat worrisome for  peritoneal metastatic disease.  5. Colonic diverticulosis. No evidence of acute diverticulitis.    MARGRET SAUCEDA MD
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
## 2018-11-11 NOTE — PLAN OF CARE
Problem: Patient Care Overview  Goal: Discharge Needs Assessment  Outcome: Adequate for Discharge Date Met: 11/11/18  A&Ox4. VSS on RA, sats low 90s. Up with SBA, walker. Low fiber diet, tolerating well, nausea improved. LS diminished. Pt c/o slight chest pain/epigastric pain, Mccartney paged, 12lead ordered, NSR; pain resolved and currently denies pain. IV removed. Discharge medications filled and sent with pt. Discharge instructions reviewed and questions answered. All belongings sent with pt. Transportation to home provided by family.     Problem: Patient Care Overview  Goal: Discharge Needs Assessment  Outcome: Adequate for Discharge Date Met: 11/11/18  A&Ox4. VSS on RA, sats low 90s. Up with SBA, walker. Low fiber diet, tolerating well, nausea improved. LS diminished. Pt c/o slight chest pain/epigastric pain, Mccartney paged, 12lead ordered, NSR; pain resolved and currently denies pain. IV removed. Discharge medications filled and sent with pt. Discharge instructions reviewed and questions answered. All belongings sent with pt. Transportation to home provided by family.

## 2018-11-11 NOTE — PROGRESS NOTES
[facility]  Gastroenterology Progress Note     Cristal Wynn MRN# [id number]   YOB: 1935 Age: 83 year old          Assessment and Plan:     Troponin level elevated  Patient did well since yesterday no further vomiting.  Patient tolerated well patient is complaining of increased abdominal sounds patient has been passing stools.  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  Patient does have significant malnutrition with weight loss decrease in albumin weakness.  Patient has been able to maintain nutrition now orally.  I will recommend the patient to continue on full liquid diet and advance to low residue diet continue on current treatment.  Monitor symptoms of diarrhea.  Patient will benefit from physical therapy.  Potential discharge to transitional care.  CT scan findings were discussed in detail with patient and family.  I will recommend further evaluation with blood work including .  Patient will need further evaluation with possible repeat upper GI endoscopy and endoscopic ultrasound in the next 1-2 weeks.  Differential diagnosis and plan was discussed in detail.            Gastritis without bleeding, unspecified chronicity, unspecified gastritis type  Generalized abdominal pain  Elevated troponin      Interval History:   doing well; no cp, sob, n/v/d, or abd pain.              Review of Systems:   C: NEGATIVE for fever, chills, change in weight  E/M: NEGATIVE for ear, mouth and throat problems  R: NEGATIVE for significant cough or SOB  CV: NEGATIVE for chest pain, palpitations or peripheral edema             Medications:   I have reviewed this patient's current medications    azithromycin  250 mg Oral Daily     losartan  100 mg Oral Daily     metoclopramide  10 mg Oral TID AC     metoprolol  succinate  25 mg Oral Daily     ondansetron  8 mg Oral TID AC     pantoprazole  40 mg Oral QAM AC     sodium chloride (PF)  3 mL Intracatheter Q8H                  Physical Exam:   Vitals were reviewed  Vital Signs with Ranges  Temp:  [97.7  F (36.5  C)-97.9  F (36.6  C)] 97.9  F (36.6  C)  Pulse:  [90-95] 95  Heart Rate:  [79-91] 91  Resp:  [16-18] 16  BP: (131-151)/(72-84) 138/81  SpO2:  [91 %-94 %] 94 %  I/O last 3 completed shifts:  In: 480 [P.O.:480]  Out: 975 [Urine:975]  Constitutional: healthy, alert and no distress   Cardiovascular: negative, PMI normal. No lifts, heaves, or thrills. RRR. No murmurs, clicks gallops or rub  Respiratory: negative, Percussion normal. Good diaphragmatic excursion. Lungs clear  Head: Normocephalic. No masses, lesions, tenderness or abnormalities  Neck: Neck supple. No adenopathy. Thyroid symmetric, normal size,, Carotids without bruits.  Abdomen: Abdomen soft, non-tender. BS normal. No masses, organomegaly  SKIN: no suspicious lesions or rashes           Data:   I reviewed the patient's new clinical lab test results.   Recent Labs   Lab Test  11/10/18   0636  11/09/18   0628  11/05/18   0651   11/01/18   1728   WBC  8.9  8.8  7.8   < >  12.2*   HGB  12.3  11.8  12.2   < >  14.7   MCV  94  95  94   < >  90   PLT  325  339  291   < >  380   INR   --    --    --    --   1.01    < > = values in this interval not displayed.     Recent Labs   Lab Test  11/10/18   1753  11/10/18   0636  11/09/18 0628  11/08/18   0634   POTASSIUM  3.6  3.8  3.7  3.6   CHLORIDE   --   105  106  105   CO2   --   25  30  35*   BUN   --   10  15  14   ANIONGAP   --   8  5  4     Recent Labs   Lab Test  11/10/18   0636  11/09/18 0628  11/02/18   0012  11/01/18   1728  10/25/18   2339   12/23/16   1415   ALBUMIN  2.5*  2.6*   --   3.9  4.0   < >  3.6   BILITOTAL  0.4  0.4   --   0.6  0.5   < >  0.7   ALT  62*  53*   --   61*  23   < >  30   AST  51*  44   --   46*  35   < >  30   PROTEIN   --    --   10*    --    --    --    --    LIPASE   --    --    --   177  153   --   639*    < > = values in this interval not displayed.       I reviewed the patient's new imaging results.    All laboratory data reviewed  All imaging studies reviewed by me.    London Cordova MD,  11/10/2018  [facility]  Office : [phone]  Cell: [phone]

## 2018-11-11 NOTE — DISCHARGE SUMMARY
[facility]    Discharge Summary  Hospitalist    Date of Admission:  11/1/2018  Date of Discharge:  11/11/2018  Discharging Provider: Yaima Mccartney MD,FACP    Discharge Diagnoses     Active Problems:  Epigastric discomfort, nausea and vomiting secondary to severe gastroparesis.  LA grade D reflux esophagitis.  Medium size hiatal hernia.  Adult hypertrophic pyloric stenosis is status post stent placement.  Anorexia.  Bilateral pleural effusion, secondary to fluid overload.  Elevated troponin, secondary to type II ischemia from gastric symptoms.  History of coronary artery disease, nonobstructive.  Essential hypertension.  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    History of Present Illness   As Per Admitting MD: The patient was discharged on 10/28 from the hospital after she was admitted with gastritis.  This was stage IV reflux esophagitis with chronic gastritis noticed on upper endoscopy.  She was admitted at that time with acute epigastric abdominal pain.  She was taking nonsteroidals.  She improved with IV proton pump inhibitors and Carafate and was discharged home on Protonix.  However, since yesterday, she has had 6-7 episodes of vomiting per day.  Her last episode was 45 minutes prior to arrival to the emergency room.  She states that her symptoms were different than the previous symptoms.  They are more burning in nature.  She also had some chest palpitations.  She described the lower abdominal bloating and fullness sensation, which improved with vomiting.  She was afraid to eat and has not eaten.  She also had some pain in the neck which was bilateral after vomiting, and her throat was also painful and burning.  She felt dehydrated.  When we e worked up the patient further, we found that 12-lead EKG showed sinus tachycardia with previous inferior infarct and anterolateral infarct with age undetermined.  This has not changed since 10/25 EKG.  She also had an abdominal x-ray  which showed no bowel obstruction.  WBC count is 12,200, hemoglobin 14.7 and platelets 380.  Potassium was low at 3, creatinine 1.11, GFR 47, ALT 61, AST 46.  These both are high.  Lipase was 177.  Patient's troponin increased to 0.127.  We also worked up the patient with a repeat chest x-ray, which by my review reveals no infiltrate or aspiration and cardiac size is normal.  Because patient's troponin has increased from baseline because she has been vomiting and is unable to keep anything down and is more dehydrated with abnormal liver enzymes, as well as abnormal creatinine, it is considered proper to admit her for further evaluation in the hospital    Hospital Course   Cristal Wynn was admitted on 11/1/2018.  The following problems were addressed during her hospitalization:    Cumulative Summary: Cristal Wynn is a 83 year old female with past medical history significant for coronary artery disease on CT angiogram, osteoarthritis, essential hypertension, bilateral cataracts and history of brain hemorrhage who was admitted on 11/1/2018 when she presented with recurrent vomiting with chest pain and elevated troponin.  Patient was admitted for further evaluation and management.  Her EKG was not indicative of acute coronary syndrome, initially she was a started on heparin infusion along with aspirin and metoprolol.  She did not have any ischemic changes on the telemetry.  2D echo of the heart showed normal ejection fraction and no wall motion abnormalities and heparin infusion was a stopped. She was also continued on IV proton pump inhibitor and gastroenterology was also consulted due to ongoing episodes of nausea and vomiting and patient unable to keep anything down. Patient was evaluated by gastroenterology for generalized abdominal chronic epigastric pain associated with chronic nausea, vomiting and severe anorexia, initially she was a started on Reglan which showed some improvement in her symptoms but  patient continued to be quite symptomatic. GI was strongly suspicious for gastroparesis. They recommended stopping patient Reglan and patient to be evaluated with gastric emptying study. Her gastric emptying study showed severe gastroparesis.  Patient was a started on routine IV Zofran along with Reglan oral 4 times a day but continued to have significant emesis.  Again patient showed some improvement and even required changing her oral Reglan to IV Reglan and IV azithromycin was also added to help with her ongoing nausea and vomiting.  At that time her EGD was repeated which showed LA grade D reflux esophagitis, residual food in the stomach, medium-sized hiatal hernia and adult hypertrophic pyloric stenosis.  Multiple attempts were made to dilate and eventually prepyloric his stent was placed.  Since then patient is able to tolerate clear liquid diet and was advanced to full liquid diet.  Patient is advised to continue to consume a small but frequent meals and was advanced to low residue diet before the discharge.  During the hospitalization patient required on and off fluid resuscitation due to unable to consume oral food and did require oxygen and hospital but was able to wean off the oxygen before the discharge.  Her echocardiogram was negative for any systolic dysfunction.  CT scan of abdomen and pelvis was also done the day before discharge which was concerning for peritoneal carcinomatosis.  Oncology was also consulted to establish patient with them.   was also ordered with the plan to follow up on the results with oncology and primary care physician after the discharge.  During the hospitalization patient was also seen by physical and occupational therapy and at this time patient is felt to be safe to be discharged home with the help of her daughters.  CT scan findings are discussed with patient and the family, they understand that at this time , plan is to improve patient clinical status by focusing  on her nutrition status as now she is able to consume diet and follow up as an outpatient for further workup for underlying malignancy.  Patient was discharged in a stable condition to home discharge plan was discussed in detail with patient and family.        Assessment & Plan     Here are further details of her hospitalization:  Active Problems:  Chronic epigastric discomfort, associated with nausea and vomiting: Most likely secondary to severe gastroparesis, patient was initially started on oral Reglan and oral azithromycin was added, as patient continued to be severely symptomatic her medications were changed to IV.  As patient not have any significant clinical improvement and continued to have multiple episodes of nausea and vomiting, GI ordered N interventional radiology but not able to place the NG tube andJ tube to be placed by interventional radiology.  GI took patient back for EGD.  Patient was found to have LA grade D reflux esophagitis, residual food in the stomach, medium sized hiatal hernia and adult hypertrophic pyloric stenosis.  No areas attempts were made to dilate and eventually stent was placed.  Patient was started on clear liquid diet which she tolerated well.diet is advanced to full and now bethany residue diet  Her Reglan and Protonix is changed to oral.her Azithromycin is now stopped   Anorexia  Acute esophagitis as well as chronic gastritis  Severe gastroparesis: Patient underwent gastric emptying study   Patient was found to have severe delayed gastric emptying with food retention of 100% at 1 hour, 95% at 2 hours, 93% at 3-hour and 88% at 4-hour as compared to normal range of 30-90% at 1 hour, less than 60% at 2 hours, less than 30% and 3 upper and less than 10% at 4 hours.     --Continue Full liquid to low residue diet after the discharge   -- Ct scan results are discussed with patient and family, concerning for peritoneal carcinomatosis,  is ordered, patient has been evaluated by  oncology yesterday who are recommending outpatient intervention radiology guided biopsy versus repeating EGD in couple of weeks once patient has improved to get fine-needle aspiration and endoscopic ultrasound with ERCP.  --Encouraged patient to continue with small and frequent full liquid meal.  --Encourage patient to do incentive spirometry.  -- Off oral azithromycin, Continue Reglan and Protonix.  -- Appreciate gastroenterology help with the care of this nice patient, GI will be following up in 1 week.  Patient will probably need EGD with endoscopic ultrasound in couple of weeks.Follow up with oncology as an out patient .     Bilateral pleural effusion , concern for acute on chronic diastolic heart failure: patient is requiring one liter of oxygen, chest xray is concerning for congestion and B/L pleural effusion although BNP is in normal range . Most likely she is fluid overloaded from fluid resuscitation when she was not able to consume any thing oral   Elevated troponin in the setting of vomiting  History of nonobstructive coronary artery disease:   Patient presented with elevated troponin, does have history of mild nonobstructive coronary artery disease via CTA.  EKG on admission did not show any indications of ACS, suspecting that her troponin elevation is secondary to demand ischemia secondary to her gastric issues. Initially she was a started on heparin infusion and aspirin.  Now patient is off heparin infusion once her echocardiogram showed normal left ventricular ejection fraction and no wall motion abnormalities.  Essential hypertension: her beta-blocker was changed to IV .  As patient is able to take her oral medications might be changed back to oral.  Acute hypoxia:  resolvedm sec to fluid overload from resuscitation.    --Continue aspirin 81 mg p.o. daily.  -- Toprol-XL 25 mg p.o. daily  -- increase activity  -- incentive spirometry  -- Continue patient on losartan 100 mg p.o. daily.  -- start her  back on her hydrochlorothiazide at discharge    Patient was seen and examined on the day of discharge ,she is feeling well, does not have any complaints , I did review the discharge medications and instructions with the patient  And her two daughters and plan for her to have close follow up with the PCP after the hospitalization .She will also need follow up with GI ans oncology .patient was in agreement , she is discharged in stable condition to her home with assist from her daughter.      Yaima Mccartney MD, FACP    Significant Results and Procedures    As Below.    Pending Results   These results will be followed up by PCP and Oncology    Unresulted Labs Ordered in the Past 30 Days of this Admission     Date and Time Order Name Status Description    11/11/2018 0000  In process           Code Status   Full Code       Primary Care Physician   Mora Bang    Physical Exam   Temp: 97.8  F (36.6  C) Temp src: Oral BP: 170/89 Pulse: 94 Heart Rate: 88 Resp: 16 SpO2: 90 % O2 Device: None (Room air) Oxygen Delivery: 2 LPM  Vitals:    11/06/18 0653 11/07/18 0552 11/10/18 0700   Weight: 74.6 kg (164 lb 8 oz) 76.7 kg (169 lb 1.5 oz) 72.6 kg (160 lb)     Vital Signs with Ranges  Temp:  [97.8  F (36.6  C)-97.9  F (36.6  C)] 97.8  F (36.6  C)  Pulse:  [90-96] 94  Heart Rate:  [88-91] 88  Resp:  [16] 16  BP: (137-170)/(72-89) 170/89  SpO2:  [90 %-95 %] 90 %  I/O last 3 completed shifts:  In: 560 [P.O.:560]  Out: 1750 [Urine:1750]    Constitutional: Awake, alert, cooperative, no apparent distress  Respiratory: Clear to auscultation bilaterally, no crackles or wheezing  Cardiovascular: Regular rate and rhythm, normal S1 and S2, and no murmur noted  GI: Normal bowel sounds, soft, non-distended, non-tender  Skin/Integumen: No rashes, no cyanosis, no edema      Discharge Disposition   Discharged to home  Condition at discharge: Good    Consultations This Hospital Stay   PHARMACY TO DOSE HEPARIN  PHYSICAL THERAPY ADULT IP  CONSULT  OCCUPATIONAL THERAPY ADULT IP CONSULT  SOCIAL WORK IP CONSULT  NUTRITION SERVICES ADULT IP CONSULT  PHYSICAL THERAPY ADULT IP CONSULT  CARE TRANSITION RN/SW IP CONSULT  HEMATOLOGY & ONCOLOGY IP CONSULT    Time Spent on this Encounter   I, Yaima Camposbal, personally saw the patient today and spent greater than 30 minutes discharging this patient.    Discharge Orders     Reason for your hospital stay   You were admitted to the hospital secondary to gastroparesis and has undergone stent placement     Follow-up and recommended labs and tests   Follow up with primary care provider, Mora Bang, within 7 days to evaluate treatment change and for hospital follow- up.  The following labs/tests are recommended: BMP and CBC.  Please follow up with  in 2 weeks     Activity   Your activity upon discharge: activity as tolerated and no driving for today     Discharge Instructions   You will be taking Reglan three times a day to help with gastroparesis   You can continue to have full liquid or Low residue diet , continue with small but frequent meals .  Please keep your appointment with gastroenterology for repeated EGD and biopsy     Full Code     Diet   Follow this diet upon discharge: Orders Placed This Encounter     Snacks/Supplements Adult: Boost Plus; Between Meals     Advance Diet as Tolerated: Low Fiber         Discharge Medications   Current Discharge Medication List      START taking these medications    Details   metoclopramide (REGLAN) 10 MG tablet Take 1 tablet (10 mg) by mouth 3 times daily (before meals)  Qty: 90 tablet, Refills: 0    Associated Diagnoses: Gastroparesis         CONTINUE these medications which have CHANGED    Details   pantoprazole (PROTONIX) 40 MG EC tablet Take 1 tablet (40 mg) by mouth every morning (before breakfast)  Qty: 30 tablet, Refills: 0    Associated Diagnoses: Gastroparesis         CONTINUE these medications which have NOT CHANGED    Details   Acetaminophen (TYLENOL  PO) Take 325-650 mg by mouth every 6 hours as needed for mild pain or fever      losartan-hydrochlorothiazide (HYZAAR) 100-12.5 MG per tablet Take 1 tablet by mouth every morning      METOPROLOL SUCCINATE ER PO Take 25 mg by mouth At Bedtime       order for DME Equipment being ordered: Walker Wheels () and Walker ()  Treatment Diagnosis: Right total knee replacement  Qty: 1 each, Refills: 0    Associated Diagnoses: Arthritis of knee, right         STOP taking these medications       sucralfate (CARAFATE) 1 GM/10ML suspension Comments:   Reason for Stopping:             Allergies   Allergies   Allergen Reactions     Asa [Aspirin]      Excedrin Back & [Acetaminophen-Aspirin Buffered]      Abdominal pain     Morphine      Data   Most Recent 3 CBC's:  Recent Labs   Lab Test  11/10/18   0636  11/09/18   0628  11/05/18   0651   WBC  8.9  8.8  7.8   HGB  12.3  11.8  12.2   MCV  94  95  94   PLT  325  339  291      Most Recent 3 BMP's:  Recent Labs   Lab Test  11/11/18   0550  11/10/18   1753  11/10/18   0636  11/09/18   0628   NA  138   --   138  141   POTASSIUM  3.6  3.6  3.8  3.7   CHLORIDE  103   --   105  106   CO2  27   --   25  30   BUN  9   --   10  15   CR  0.77   --   0.68  0.73   ANIONGAP  8   --   8  5   DINH  8.3*   --   8.4*  8.5   GLC  86   --   96  103*     Most Recent 2 LFT's:  Recent Labs   Lab Test  11/10/18   0636  11/09/18   0628   AST  51*  44   ALT  62*  53*   ALKPHOS  72  67   BILITOTAL  0.4  0.4     Most Recent INR's and Anticoagulation Dosing History:  Anticoagulation Dose History     Recent Dosing and Labs Latest Ref Rng & Units 2/17/2010 2/18/2010 2/19/2010 2/20/2010 11/1/2018    INR 0.86 - 1.14 0.96 1.05 1.22(H) 1.51(H) 1.01        Most Recent 3 Troponin's:  Recent Labs   Lab Test  11/02/18   0759  11/01/18   1728  10/25/18   2339   TROPI  0.060*  0.127*  <0.015     Most Recent Cholesterol Panel:  Recent Labs   Lab Test  11/13/14   1145   CHOL  242*   LDL  141   HDL  69   TRIG  158*      Most Recent 6 Bacteria Isolates From Any Culture (See EPIC Reports for Culture Details):  Recent Labs   Lab Test  11/04/18   1330   CULT  <10,000 colonies/mL  urogenital nilton  Susceptibility testing not routinely done       Most Recent TSH, T4 and A1c Labs:  Recent Labs   Lab Test  11/09/18   0628   TSH  0.35*   T4  1.21     Results for orders placed or performed during the hospital encounter of 11/01/18   Abdomen XR, 2 vw, flat and upright    Narrative    ABDOMEN TWO VIEWS  11/1/2018 6:12 PM     COMPARISON: None    HISTORY: Abdominal pain, bloating.     FINDINGS: Cholecystectomy changes noted. Abdominal bowel gas pattern  is nonspecific. There is no evidence for free intraperitoneal air.      Impression    IMPRESSION: No evidence for bowel obstruction or free air.    ARJUN LUNA MD   XR Chest 2 Views    Narrative    CHEST TWO VIEWS 11/1/2018 7:35 PM     HISTORY: Chest pain    COMPARISON: 10/26/2018    FINDINGS: Heart size and pulmonary vascularity normal. There are small  bilateral pleural effusions. Minimal airspace opacity at the left lung  base. No pneumothorax.       Impression    IMPRESSION: Small bilateral pleural effusions. Minimal airspace  opacity at the left lung base may represent atelectasis or pneumonia.     ABDOULAYE MAYER MD   XR Abdomen 2 Views    Narrative    ABDOMEN TWO VIEWS  11/4/2018  9:06 AM     HISTORY: Emesis.    COMPARISON: 11/1/2018      Impression    IMPRESSION: Normal bowel gas pattern, no free air. Bilateral pleural  effusions. Right total hip arthroplasty.    CECILIA JAMESON MD   NM Gastric Emptying    Narrative    NUCLEAR MEDICINE GASTRIC EMPTYING  11/5/2018 2:39 PM     HISTORY: Evaluate for gastroparesis.     COMPARISON: None.    TECHNIQUE:  Patient was given Tc99M labeled sulfur colloid in food.  Immediate, one, two, three, and four hours static images over the  stomach or until less than 10% of radionuclide remains in stomach    DOSE: 1.0mci TC Sulfur Colloid in 2 eggs,  toast and 1 cup water    FINDINGS:  The amount of retained activity within the stomach is as  follows-    One hour:  100 % (Normal 30-90%)    Two hour:  95% (Normal <60%)    Three hour:  93 % (Normal <30%)    Four hour:  88 % (Normal <10%)      Impression    IMPRESSION:  Severe delayed gastric emptying.    ELIF PABLO MD   XR Feeding Tube Placement    Narrative    FEEDING TUBE PLACEMENT   11/8/2018 2:50 PM    HISTORY: Gastroparesis with vomiting.    COMPARISON: None    FINDINGS: 9.3 minutes of fluoroscopy were utilized for placement of a  feeding tube.  At the final position, the feeding tube tip was the  stomach. Numerous attempts were made to advance this tube beyond the  stomach without success. Dr. Moya was also consulted. He attempted in  addition without success. We left the tube in the stomach and we will  evaluate with x-ray tomorrow to see if the tube advances on its own.  35 mL of contrast was injected during the procedure.  The tube was  marked at the level of the nose at the 76 cm length.  Estimated blood  loss during the procedure was less than 5 mL. No specimens collected.  There were no complications of the procedure.    Medications used: 35 ml Omnipaque 240, 7 mL 2% Lidocaine Gel  Images Obtained: 2      Impression    IMPRESSION: Successful NG tube placement however unable to pass the  feeding tube into the jejunum. Do not use this tube..    ADITYA SHAH PA-C   XR Chest Port 1 View    Narrative    CHEST ONE VIEW UPRIGHT 11/9/2018 5:40 PM     HISTORY: Hypoxia, concern for atelectasis versus fluid overload.     COMPARISON: 11/1/2018      Impression    IMPRESSION: Marked bibasilar effusions and/or infiltrates, question  congestive heart failure.    ELIF PABLO MD   CT Abdomen Pelvis w Contrast    Narrative    CT ABDOMEN AND PELVIS WITH CONTRAST   11/10/2018 7:26 AM     HISTORY: Abdominal pain, nausea, vomiting.     TECHNIQUE:   81 mL Isovue-370. Radiation dose for this scan was  reduced using  automated exposure control, adjustment of the mA and/or  kV according to patient size, or iterative reconstruction technique.    COMPARISON: 10/26/2018    FINDINGS:   There are moderate bilateral pleural effusions, increased  in size since the previous examination. Underlying  consolidation/atelectasis in both lung bases. The heart size is  normal.    There is a 3.2 cm cyst in the left hepatic lobe, unchanged. The liver  is otherwise unremarkable. The gallbladder is absent. The spleen,  pancreas, and adrenal glands are unremarkable. There are bilateral  renal cysts. A gastric stent has been placed. There is moderate  gastric wall thickening. There is no evidence of bowel obstruction.  Colonic diverticulosis is again seen. There is contrast in the colon.  Appendix not identified. Small amount of abdominal ascites. Small to  moderate amount of pelvic ascites, increased since the previous  examination. There is no free intraperitoneal air. Again noted are  areas of nodularity in the mesenteric fat. A right hip arthroplasty  causes beam hardening artifact in the pelvis. There is a mildly  enlarged right external iliac lymph node, unchanged.         Impression    IMPRESSION:   1. Moderate bilateral pleural effusions, increased since the previous  examination.  2. Interval placement of a gastric stent. There is persistent gastric  wall thickening which could be due to inflammation but malignancy  could also cause this appearance.  3. Small amount of abdominal and moderate amount of pelvic ascites.  4. Soft tissue nodularity in the mesenteric fat worrisome for  peritoneal metastatic disease.  5. Colonic diverticulosis. No evidence of acute diverticulitis.    MARGRET SAUCEDA MD

## 2018-11-11 NOTE — PLAN OF CARE
Problem: Patient Care Overview  Goal: Plan of Care/Patient Progress Review  Outcome: No Change  Pt A&Ox4; calm, cooperative and pleasant. VSS; LS diminished; BAKER; on 1L of O2 via N/C. PO lasix given; diuresing. Incentive spirometer use encouraged. BS hypoactive; passing flatus. BM x1. Tolerating full liquid; no c/o n/v. Denies pain. Had Heme/Onc consult this evening due to abnormal abdominal CT result. Up with assist of 1 person/ GB/walker. Ambulated in the hook x1. Family present throughout the day/ supportive. Will continue to monitor.

## 2018-11-11 NOTE — PLAN OF CARE
Problem: Patient Care Overview  Goal: Plan of Care/Patient Progress Review  Patient A&Ox4. VSS on 2 L NC. BAKER. C/o some abdominal discomfort denied intervention. BS hypoactive. Tolerating full liquid diet. Denies nausea/vomitting. Up with A1 walker and GB. Will continue to monitor.

## 2018-11-12 NOTE — PLAN OF CARE
Problem: PT General Care Plan  Goal: Gait (PT)  PT Gait    Physical Therapy Discharge Summary    Reason for therapy discharge:    Discharged to home with home therapy.    Progress towards therapy goal(s). See goals on Care Plan in [facility] electronic health record for goal details.  Goals not met.  Barriers to achieving goals:   discharge from facility.    Therapy recommendation(s):    Continued therapy is recommended.  Rationale/Recommendations:  Home PT to progress mobility to independent.

## 2018-11-30 NOTE — DISCHARGE INSTRUCTIONS
"    Vomiting (Adult)  Vomiting is a common symptom that may be due to different causes. These include gastroenteritis (\"stomach flu\"), food poisoning and gastritis. There are other more serious causes of vomiting which may be hard to diagnose early in the illness. Therefore, it is important to watch for the warning signs listed below.  The main danger from repeated vomiting is dehydration. This is due to excess loss of water and minerals from the body. When this occurs, your body fluids must be replaced.  Home care    If symptoms are severe, rest at home for the next 24 hours.    Because your symptoms may be from an infection, wash your hands often and well. If soap and water are not available, use alcohol-based  to keep from spreading the infection to others.    Wash your hands for at least 20 seconds. Humming the happy birthday song twice while you wash is an easy way to make sure you've washed for 20 seconds.    Wash your hands after using the toilet, before and after preparing food, before eating food, after changing a diaper, cleaning a wound, caring for a sick person, and blowing your nose, coughing, or sneezing. You should also wash your hands after caring for someone who is sick, touching pet food, or treats, and touching an animal, or animal waste.    You may use acetaminophen or NSAID medicines like ibuprofen or naproxen to control fever, unless another medicine was prescribed. If you have chronic liver or kidney disease or ever had a stomach ulcer or gastrointestinal bleeding, talk with your doctor before using these medicines. Aspirin should never be used in anyone under 18 years of age who is ill with a fever. It may cause severe liver damage. Don't use NSAID medicines if you are already taking one for another condition (like arthritis) or are on aspirin (such as for heart disease, or after a stroke)    Don't use tobacco and or drink alcohol, which may worsen your symptoms.    If medicines for " vomiting were prescribed, take as directed.    Once vomiting stops, then follow these guidelines:  During the first 12 to 24 hours follow the diet below:    Fruit juices. Apple, grape juice, clear fruit drinks, and electrolyte replacement drinks.    Beverages. Soft drinks without caffeine; mineral water (plain or flavored), decaffeinated tea and coffee.    Soups. Clear broth and bouillon    Desserts. Plain gelatin, ice pops, and fruit juice bars. As you feel better, you may add 6 to 8 ounces of yogurt per day.  During the next 24 hours you may add the following to the above:    Hot cereal, plain toast, bread, rolls, crackers    Plain noodles, rice, mashed potatoes, chicken noodle or rice soup    Unsweetened canned fruit such as applesauce, bananas (avoid pineapple and citrus)    Limit caffeine and chocolate. No spices or seasonings except salt.  During the next 24 hours:  Gradually resume a normal diet, as you feel better and your symptoms lessen.  Follow-up care  Follow up with your healthcare provider, or as advised.  When to seek medical advice  Call your healthcare provider right away if any of these occur:    Constant right-sided lower belly pain or increasing general belly pain    Continued vomiting (unable to keep liquids down) for 24 hours    Vomiting blood or coffee grounds    Swollen belly    Frequent diarrhea (more than 5 times a day); blood (red or black color) or mucus in diarrhea    Reduced urine output or extreme thirst    Weakness, dizziness or fainting    Unusually drowsy or confused    Fever of 100.4 F (38 C) oral or higher, or as directed    Yellow color of the eyes or skin  Date Last Reviewed: 3/1/2018    9447-5638 [employer]. [address] [place] [address]290. All rights reserved. This information is not intended as a substitute for professional medical care. Always follow your healthcare professional's instructions.          Dehydration (Adult)  Dehydration occurs when  your body loses too much fluid. This may be the result of prolonged vomiting or diarrhea, excessive sweating, or a high fever. It may also happen if you don t drink enough fluid when you re sick or out in the heat. Misuse of diuretics (water pills) can also be a cause.  Symptoms include thirst and decreased urine output. You may also feel dizzy, weak, fatigued, or very drowsy. The diet described below is usually enough to treat dehydration. In some cases, you may need medicine.  Home care    Drink at least 12 8-ounce glasses of fluid every day to resolve the dehydration. Fluid may include water; orange juice; lemonade; apple, grape, or cranberry juice; clear fruit drinks; electrolyte replacement and sports drinks; and teas and coffee without caffeine. Don't drink alcohol. If you have been diagnosed with a kidney disease, ask your doctor how much and what types of fluids you should drink to prevent dehydration. If you have kidney disease, fluid can build up in the body. This can be dangerous to your health.    If you have a fever, muscle aches, or a headache as a result of a cold or flu, you may take acetaminophen or ibuprofen, unless another medicine was prescribed. If you have chronic liver or kidney disease, or have ever had a stomach ulcer or gastrointestinal bleeding, talk with your healthcare provider before using these medicines. Don't take aspirin if you are younger than 18 and have a fever. Aspirin raises the chance for severe liver injury.  Follow-up care  Follow up with your healthcare provider, or as advised.  When to seek medical advice  Call your healthcare provider right away if any of these occur:    Continued vomiting    Frequent diarrhea (more than 5 times a day); blood (red or black color) or mucus in diarrhea    Blood in vomit or stool    Swollen abdomen or increasing abdominal pain    Weakness, dizziness, or fainting    Unusual drowsiness or confusion    Reduced urine output or extreme  thirst    Fever of 100.4 F (38 C) or higher  Date Last Reviewed: 5/1/2017 2000-2018 [employer]. [address] [place] [address], [place] [address]. All rights reserved. This information is not intended as a substitute for professional medical care. Always follow your healthcare professional's instructions.

## 2018-11-30 NOTE — ED AVS SNAPSHOT
"  Emergency Department    [address] [facility] [place] [address]    Phone:  [phone]    Fax:  [phone]                                       Cristal Wynn   MRN: [id number]    Department:   Emergency Department   Date of Visit:  11/30/2018           Patient Information     Date Of Birth          1935        Your diagnoses for this visit were:     Vomiting, intractability of vomiting not specified, presence of nausea not specified, unspecified vomiting type     Dehydration        You were seen by Talha Soto MD.      Follow-up Information     Follow up with London Cordova MD. Call today.    Specialty:  Gastroenterology    Contact information:    [facility]  [address] [place] [address] [address] [place] [id number] [phone]          Discharge Instructions           Vomiting (Adult)  Vomiting is a common symptom that may be due to different causes. These include gastroenteritis (\"stomach flu\"), food poisoning and gastritis. There are other more serious causes of vomiting which may be hard to diagnose early in the illness. Therefore, it is important to watch for the warning signs listed below.  The main danger from repeated vomiting is dehydration. This is due to excess loss of water and minerals from the body. When this occurs, your body fluids must be replaced.  Home care    If symptoms are severe, rest at home for the next 24 hours.    Because your symptoms may be from an infection, wash your hands often and well. If soap and water are not available, use alcohol-based  to keep from spreading the infection to others.    Wash your hands for at least 20 seconds. Humming the happy birthday song twice while you wash is an easy way to make sure you've washed for 20 seconds.    Wash your hands after using the toilet, before and after preparing food, before eating food, after changing a diaper, cleaning a wound, caring for a sick person, and blowing your nose, coughing, or " sneezing. You should also wash your hands after caring for someone who is sick, touching pet food, or treats, and touching an animal, or animal waste.    You may use acetaminophen or NSAID medicines like ibuprofen or naproxen to control fever, unless another medicine was prescribed. If you have chronic liver or kidney disease or ever had a stomach ulcer or gastrointestinal bleeding, talk with your doctor before using these medicines. Aspirin should never be used in anyone under 18 years of age who is ill with a fever. It may cause severe liver damage. Don't use NSAID medicines if you are already taking one for another condition (like arthritis) or are on aspirin (such as for heart disease, or after a stroke)    Don't use tobacco and or drink alcohol, which may worsen your symptoms.    If medicines for vomiting were prescribed, take as directed.    Once vomiting stops, then follow these guidelines:  During the first 12 to 24 hours follow the diet below:    Fruit juices. Apple, grape juice, clear fruit drinks, and electrolyte replacement drinks.    Beverages. Soft drinks without caffeine; mineral water (plain or flavored), decaffeinated tea and coffee.    Soups. Clear broth and bouillon    Desserts. Plain gelatin, ice pops, and fruit juice bars. As you feel better, you may add 6 to 8 ounces of yogurt per day.  During the next 24 hours you may add the following to the above:    Hot cereal, plain toast, bread, rolls, crackers    Plain noodles, rice, mashed potatoes, chicken noodle or rice soup    Unsweetened canned fruit such as applesauce, bananas (avoid pineapple and citrus)    Limit caffeine and chocolate. No spices or seasonings except salt.  During the next 24 hours:  Gradually resume a normal diet, as you feel better and your symptoms lessen.  Follow-up care  Follow up with your healthcare provider, or as advised.  When to seek medical advice  Call your healthcare provider right away if any of these  occur:    Constant right-sided lower belly pain or increasing general belly pain    Continued vomiting (unable to keep liquids down) for 24 hours    Vomiting blood or coffee grounds    Swollen belly    Frequent diarrhea (more than 5 times a day); blood (red or black color) or mucus in diarrhea    Reduced urine output or extreme thirst    Weakness, dizziness or fainting    Unusually drowsy or confused    Fever of 100.4 F (38 C) oral or higher, or as directed    Yellow color of the eyes or skin  Date Last Reviewed: 3/1/2018    8493-1429 [employer]. [address] [place] [address]. All rights reserved. This information is not intended as a substitute for professional medical care. Always follow your healthcare professional's instructions.          Dehydration (Adult)  Dehydration occurs when your body loses too much fluid. This may be the result of prolonged vomiting or diarrhea, excessive sweating, or a high fever. It may also happen if you don t drink enough fluid when you re sick or out in the heat. Misuse of diuretics (water pills) can also be a cause.  Symptoms include thirst and decreased urine output. You may also feel dizzy, weak, fatigued, or very drowsy. The diet described below is usually enough to treat dehydration. In some cases, you may need medicine.  Home care    Drink at least 12 8-ounce glasses of fluid every day to resolve the dehydration. Fluid may include water; orange juice; lemonade; apple, grape, or cranberry juice; clear fruit drinks; electrolyte replacement and sports drinks; and teas and coffee without caffeine. Don't drink alcohol. If you have been diagnosed with a kidney disease, ask your doctor how much and what types of fluids you should drink to prevent dehydration. If you have kidney disease, fluid can build up in the body. This can be dangerous to your health.    If you have a fever, muscle aches, or a headache as a result of a cold or flu, you may take  acetaminophen or ibuprofen, unless another medicine was prescribed. If you have chronic liver or kidney disease, or have ever had a stomach ulcer or gastrointestinal bleeding, talk with your healthcare provider before using these medicines. Don't take aspirin if you are younger than 18 and have a fever. Aspirin raises the chance for severe liver injury.  Follow-up care  Follow up with your healthcare provider, or as advised.  When to seek medical advice  Call your healthcare provider right away if any of these occur:    Continued vomiting    Frequent diarrhea (more than 5 times a day); blood (red or black color) or mucus in diarrhea    Blood in vomit or stool    Swollen abdomen or increasing abdominal pain    Weakness, dizziness, or fainting    Unusual drowsiness or confusion    Reduced urine output or extreme thirst    Fever of 100.4 F (38 C) or higher  Date Last Reviewed: 5/1/2017 2000-2018 [employer]. [address] [place] [address]. All rights reserved. This information is not intended as a substitute for professional medical care. Always follow your healthcare professional's instructions.            24 Hour Appointment Hotline       To make an appointment at any [facility], call [phone] ([phone]). If you don't have a family doctor or clinic, we will help you find one. [facility] are conveniently located to serve the needs of you and your family.             Review of your medicines      START taking        Dose / Directions Last dose taken    ondansetron 4 MG ODT tab   Commonly known as:  ZOFRAN ODT   Dose:  4 mg   Quantity:  10 tablet        Take 1 tablet (4 mg) by mouth every 8 hours as needed for nausea   Refills:  0          Our records show that you are taking the medicines listed below. If these are incorrect, please call your family doctor or clinic.        Dose / Directions Last dose taken    losartan-hydrochlorothiazide 100-12.5 MG tablet   Commonly  known as:  HYZAAR   Dose:  1 tablet        Take 1 tablet by mouth every morning   Refills:  0        metoclopramide 10 MG tablet   Commonly known as:  REGLAN   Dose:  10 mg   Quantity:  90 tablet        Take 1 tablet (10 mg) by mouth 3 times daily (before meals)   Refills:  0        METOPROLOL SUCCINATE ER PO   Dose:  25 mg        Take 25 mg by mouth At Bedtime   Refills:  0        order for DME   Quantity:  1 each        Equipment being ordered: Walker Wheels () and Walker () Treatment Diagnosis: Right total knee replacement   Refills:  0        pantoprazole 40 MG EC tablet   Commonly known as:  PROTONIX   Dose:  40 mg   Quantity:  30 tablet        Take 1 tablet (40 mg) by mouth every morning (before breakfast)   Refills:  0        TYLENOL PO   Dose:  325-650 mg        Take 325-650 mg by mouth every 6 hours as needed for mild pain or fever   Refills:  0                Prescriptions were sent or printed at these locations (1 Prescription)                   Other Prescriptions                Printed at Department/Unit printer (1 of 1)         ondansetron (ZOFRAN ODT) 4 MG ODT tab                Procedures and tests performed during your visit     CBC with platelets differential    Comprehensive metabolic panel    Lipase      Orders Needing Specimen Collection     None      Pending Results     No orders found from 11/28/2018 to 12/1/2018.            Pending Culture Results     No orders found from 11/28/2018 to 12/1/2018.            Pending Results Instructions     If you had any lab results that were not finalized at the time of your Discharge, you can call the ED Lab Result RN at [phone]. You will be contacted by this team for any positive Lab results or changes in treatment. The nurses are available 7 days a week from 10A to 6:30P.  You can leave a message 24 hours per day and they will return your call.        Test Results From Your Hospital Stay        11/30/2018  7:42 AM      Component Results      Component Value Ref Range & Units Status    WBC 8.0 4.0 - 11.0 10e9/L Final    RBC Count 4.21 3.8 - 5.2 10e12/L Final    Hemoglobin 12.8 11.7 - 15.7 g/dL Final    Hematocrit 37.9 35.0 - 47.0 % Final    MCV 90 78 - 100 fl Final    MCH 30.4 26.5 - 33.0 pg Final    MCHC 33.8 31.5 - 36.5 g/dL Final    RDW 13.8 10.0 - 15.0 % Final    Platelet Count 304 150 - 450 10e9/L Final    Diff Method Automated Method  Final    % Neutrophils 72.6 % Final    % Lymphocytes 13.1 % Final    % Monocytes 7.9 % Final    % Eosinophils 5.5 % Final    % Basophils 0.5 % Final    % Immature Granulocytes 0.4 % Final    Nucleated RBCs 0 0 /100 Final    Absolute Neutrophil 5.8 1.6 - 8.3 10e9/L Final    Absolute Lymphocytes 1.0 0.8 - 5.3 10e9/L Final    Absolute Monocytes 0.6 0.0 - 1.3 10e9/L Final    Absolute Eosinophils 0.4 0.0 - 0.7 10e9/L Final    Absolute Basophils 0.0 0.0 - 0.2 10e9/L Final    Abs Immature Granulocytes 0.0 0 - 0.4 10e9/L Final    Absolute Nucleated RBC 0.0  Final         11/30/2018  8:04 AM      Component Results     Component Value Ref Range & Units Status    Sodium 140 133 - 144 mmol/L Final    Potassium 3.8 3.4 - 5.3 mmol/L Final    Chloride 110 (H) 94 - 109 mmol/L Final    Carbon Dioxide 22 20 - 32 mmol/L Final    Anion Gap 8 3 - 14 mmol/L Final    Glucose 101 (H) 70 - 99 mg/dL Final    Urea Nitrogen 19 7 - 30 mg/dL Final    Creatinine 1.15 (H) 0.52 - 1.04 mg/dL Final    GFR Estimate 45 (L) >60 mL/min/1.7m2 Final    Non  GFR Calc    GFR Estimate If Black 55 (L) >60 mL/min/1.7m2 Final    African American GFR Calc    Calcium 8.8 8.5 - 10.1 mg/dL Final    Bilirubin Total 0.4 0.2 - 1.3 mg/dL Final    Albumin 2.9 (L) 3.4 - 5.0 g/dL Final    Protein Total 6.5 (L) 6.8 - 8.8 g/dL Final    Alkaline Phosphatase 82 40 - 150 U/L Final    ALT 37 0 - 50 U/L Final    AST 27 0 - 45 U/L Final         11/30/2018  8:01 AM      Component Results     Component Value Ref Range & Units Status    Lipase 127 73 - 393 U/L Final                 Clinical Quality Measure: Blood Pressure Screening     Your blood pressure was checked while you were in the emergency department today. The last reading we obtained was  BP: 159/81 . Please read the guidelines below about what these numbers mean and what you should do about them.  If your systolic blood pressure (the top number) is less than 120 and your diastolic blood pressure (the bottom number) is less than 80, then your blood pressure is normal. There is nothing more that you need to do about it.  If your systolic blood pressure (the top number) is 120-139 or your diastolic blood pressure (the bottom number) is 80-89, your blood pressure may be higher than it should be. You should have your blood pressure rechecked within a year by a primary care provider.  If your systolic blood pressure (the top number) is 140 or greater or your diastolic blood pressure (the bottom number) is 90 or greater, you may have high blood pressure. High blood pressure is treatable, but if left untreated over time it can put you at risk for heart attack, stroke, or kidney failure. You should have your blood pressure rechecked by a primary care provider within the next 4 weeks.  If your provider in the emergency department today gave you specific instructions to follow-up with your doctor or provider even sooner than that, you should follow that instruction and not wait for up to 4 weeks for your follow-up visit.        Thank you for choosing [facility]       Thank you for choosing [facility] for your care. Our goal is always to provide you with excellent care. Hearing back from our patients is one way we can continue to improve our services. Please take a few minutes to complete the written survey that you may receive in the mail after you visit with us. Thank you!        [facility] Information     [employer] lets you send messages to your doctor, view your test results, renew your prescriptions, schedule appointments and more. To  "sign up, go to [email or web address] . Click on \"Log in\" on the left side of the screen, which will take you to the Welcome page. Then click on \"Sign up Now\" on the right side of the page.     You will be asked to enter the access code listed below, as well as some personal information. Please follow the directions to create your username and password.     Your access code is: [email or web address]  Expires: 2019 10:09 AM     Your access code will  in 90 days. If you need help or a new code, please call your [facility] clinic or [phone].        Care EveryWhere ID     This is your Care EveryWhere ID. This could be used by other organizations to access your [facility] medical records  [id number]        Equal Access to Services     RUDDY RAE : Tabatha Barrow, carmela cunningham, anika kulkarni, ivette eldridge. So [facility] [phone].    ATENCIÓN: Si habla español, tiene a birch disposición servicios gratuitos de asistencia lingüística. Saima al [phone].    We comply with applicable federal civil rights laws and [place] laws. We do not discriminate on the basis of race, color, national origin, age, disability, sex, sexual orientation, or gender identity.            After Visit Summary       This is your record. Keep this with you and show to your community pharmacist(s) and doctor(s) at your next visit.                  "

## 2018-11-30 NOTE — TELEPHONE ENCOUNTER
Oncology Follow up note:    I spoke to Shira and explained that I have reviewed her case with Dr. Cordova in GI. Her ascites, markedly elevated  does suggest possible ovarian source. Her arthroplasty limits evaluation in pelvis. She continues to have increasing abdominal distention. I will get a US paracentesis and fluid for cytology. I will reach out to Dr. Barcenas in gynecologic oncology. I will arrange for a visit in Oncology and Palliative care for Cristal jasso.     Ryan Rg  ,  Division of Hematology, Oncology & Transplantation  [facility].

## 2018-11-30 NOTE — ED PROVIDER NOTES
History     Chief Complaint:  Abdominal pain and vomiting    HPI   Cristal Wynn is a 83 year old female with chronic gastritis, GERD, hypertension, hyperlipidemia, diverticulitis, CAD, who presents for evaluation of vomiting and abdominal pain. The patient has had umbilical abdominal pain for the past two months, recently hospitalized here for gastritis the first week in November. Dr. Cordova, of gastroenterology, performed and endoscopy with stent placement on 18. Last night, the patient began vomiting yellow emesis around 22:00 and again this morning, prompting her to come in. She states her abdominal pain is not as severe as her previous pains. The patient had called her primary care provider, who the patient states wanted to consult oncologist about concerns for cancer. She notes having a bowel movement yesterday.     Allergies:  Asa [Aspirin]  Excedrin Back & [Acetaminophen-Aspirin Buffered]  Morphine      Medications:    Losartan-HCTZ  Reglan   Metoprolol  Protonix     Past Medical History:    Gastritis   Diverticulitis   CAD  Hypertension   Hyperlipidemia   Cataracts   Cerebral hemorrhage   GERD  Osteoarthritis     Past Surgical History:    Right knee total knee arthroplasty   Cosmetic surgery   EGD combined x2   Hysterectomy    x2  Plate under left eye surgery and jaw surgery   Cholecystectomy  Right hip replacement - bilateral   Cornea implant - bilateral     Family History:    Prostate cancer - father   Heart failure, heart disease - brother   Lymphoma, CAD - sister     Social History:  The patient was accompanied to the ED by son.  Smoking Status: never  Smokeless Tobacco: never  Alcohol Use: yes    Marital Status:   [5]     Review of Systems   Gastrointestinal: Positive for abdominal pain, nausea and vomiting. Negative for constipation.     Physical Exam     Physical Exam    Patient Vitals for the past 24 hrs:   BP Temp Temp src Heart Rate Resp SpO2 Weight   18 0915 - -  - - - 96 % -   11/30/18 0900 (!) 169/100 - - - - 96 % -   11/30/18 0845 - - - - - 96 % -   11/30/18 0830 (!) 158/92 - - - - 94 % -   11/30/18 0650 159/81 97.3  F (36.3  C) Oral 104 18 96 % 64.9 kg (143 lb)     General: Alert and Interactive.   Head: No signs of trauma.   Mouth/Throat: Oropharynx is clear. Dry mucous membranes   Eyes: Conjunctivae are normal. Pupils are equal, round, and reactive to light.   Neck: Normal range of motion. No nuchal rigidity.   CV: Normal rate and regular rhythm.    Resp: Effort normal and breath sounds normal. No respiratory distress.   GI: Soft. There is no tenderness or guarding.   MSK: Slight tenderness to palpation. Normal range of motion. no edema.   Neuro: The patient is alert and oriented to person, place, and time.  PERRLA, EOMI, strength in upper/lower extremities normal and symmetrical.   Sensation normal. Speech normal.  GCS eye subscore is 4. GCS verbal subscore is 5. GCS motor subscore is 6.   Skin: Skin is warm and dry. No rash noted.   Psych: normal mood and affect. behavior is normal.        Emergency Department Course     Laboratory:  Laboratory findings were communicated with the patient who voiced understanding of the findings.    CBC: WBC 8.0, HGB 12.8,   CMP: Creat 1.15 (H), Cl 110 (H), GFR 45 (L), Albumin 2.9 (L), Protein 6.5 (L), Glucose 101 (H) o/w WNL Lipase: 127     Interventions:  0729 NS, 1 L, IV   0733 Zofran 4mg IV   0817 NS, 1 L, IV        Emergency Department Course:  Nursing notes and vitals reviewed.  I entered the room.  I performed an exam of the patient as documented above.     IV was inserted and blood was drawn for laboratory testing, results above.    The patient received the above intervention(s).     0823 the patient was rechecked and updated regarding the results of the laboratory studies.      0915 the patient was rechecked and I discussed the treatment plan with the patient. They expressed understanding of this plan and consented to  discharge. They will be discharged home with instructions for care and follow up. In addition, the patient will return to the emergency department if their symptoms worsen, if new symptoms arise or if there is any concern.  All questions were answered.     Impression & Plan      Medical Decision Making:  Cristal Wynn is a 83 year old female presented to the ED with a complaint of vomiting. She has a history of gastritis and had an upper GI stent placed earlier this month, so considered possible complication from this. The patient was dehydrated here, replaced with IV fluids. Zofran given here and she had no further vomiting here. She had a relatively benign abdominal exam. Labs were reassuring. I have encouraged continued oral hydration and Zofran prescription for home. She will be discharged with follow up with the primary care provider.    Diagnosis:    ICD-10-CM    1. Vomiting, intractability of vomiting not specified, presence of nausea not specified, unspecified vomiting type R11.10    2. Dehydration E86.0      Disposition:   The patient was discharged to home.    Discharge Medications:  Discharge Medication List as of 11/30/2018  9:29 AM      START taking these medications    Details   ondansetron (ZOFRAN ODT) 4 MG ODT tab Take 1 tablet (4 mg) by mouth every 8 hours as needed for nausea, Disp-10 tablet, R-0, Local Print           Scribe Disclosure:  Tree CARDOZA, am serving as a scribe at 6:56 AM on 11/30/2018 to document services personally performed by Talha Soto MD, based on my observations and the provider's statements to me.    EMERGENCY DEPARTMENT       Talha Soto MD  11/30/18 1934

## 2018-11-30 NOTE — ED AVS SNAPSHOT
Emergency Department    [address] [facility] [address]    Phone:  [phone]    Fax:  [phone]                                       Cristal Wynn   MRN: [id number]    Department:   Emergency Department   Date of Visit:  11/30/2018           After Visit Summary Signature Page     I have received my discharge instructions, and my questions have been answered. I have discussed any challenges I see with this plan with the nurse or doctor.    ..........................................................................................................................................  Patient/Patient Representative Signature      ..........................................................................................................................................  Patient Representative Print Name and Relationship to Patient    ..................................................               ................................................  Date                                   Time    ..........................................................................................................................................  Reviewed by Signature/Title    ...................................................              ..............................................  Date                                               Time          22EPIC Rev 08/18

## 2018-12-04 NOTE — PATIENT INSTRUCTIONS
IR biopsy of mesenteric lymph node    Visit with Dr Barcenas after biopsy complete    Central scheduling will call patient and schedule for the biopsy.  Then we need to schedule patient with Dr Barcenas.  AVS given to patient

## 2018-12-04 NOTE — PROGRESS NOTES
"Oncology Rooming Note    December 4, 2018 12:01 PM   Cristal Wynn is a 83 year old female who presents for:    Chief Complaint   Patient presents with     Oncology Clinic Visit     new consult     Initial Vitals: /77  Pulse 98  Temp 98.3  F (36.8  C) (Tympanic)  Wt 66.6 kg (146 lb 12.8 oz)  SpO2 95%  BMI 24.43 kg/m2 Estimated body mass index is 24.43 kg/(m^2) as calculated from the following:    Height as of 11/1/18: 1.651 m (5' 5\").    Weight as of this encounter: 66.6 kg (146 lb 12.8 oz). Body surface area is 1.75 meters squared.  No Pain (0) Comment: Data Unavailable   No LMP recorded. Patient has had a hysterectomy.  Allergies reviewed: Yes  Medications reviewed: Yes    Medications: Medication refills not needed today.  Pharmacy name entered into [facility]:    [facility] [address] [place] [address] - [address] [place] [address] AT [facility] [address] [place] [address] [address] AT [place] [address] & [facility]    Clinical concerns: new consult    8 minutes for nursing intake (face to face time)     Ligia Boswell RN     DISCHARGE PLAN:  Next appointments: See patient instruction section  Departure Mode: cane  Accompanied by: daughter  0 minutes for nursing discharge (face to face time)   Ligia Boswell RN                  "
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
## 2018-12-04 NOTE — MR AVS SNAPSHOT
After Visit Summary   12/4/2018    Cristal Wynn    MRN: [id number]           Patient Information     Date Of Birth          1935        Visit Information        Provider Department      12/4/2018 12:00 PM Radha Brandt MD [facility]        Today's Diagnoses     Peritoneal carcinomatosis (H)    -  1      Care Instructions    IR biopsy of mesenteric lymph node    Visit with Dr Barcenas after biopsy complete    Central scheduling will call patient and schedule for the biopsy.  Then we need to schedule patient with Dr Barcenas.  AVS given to patient            Follow-ups after your visit        Your next 10 appointments already scheduled     Dec 14, 2018  9:00 AM CST   New Visit with Chritsina Campbell MD   [facility] ([facility])    [facility] [place]  [address] [place] [id number] [phone]              Future tests that were ordered for you today     Open Future Orders        Priority Expected Expires Ordered    IR Lymph Node Biopsy Routine  12/3/2019 12/3/2018            Who to contact     If you have questions or need follow up information about today's clinic visit or your schedule please contact [facility] directly at [phone].  Normal or non-critical lab and imaging results will be communicated to you by MyChart, letter or phone within 4 business days after the clinic has received the results. If you do not hear from us within 7 days, please contact the clinic through MyChart or phone. If you have a critical or abnormal lab result, we will notify you by phone as soon as possible.  Submit refill requests through [employer] or call your pharmacy and they will forward the refill request to us. Please allow 3 business days for your refill to be completed.          Additional Information About Your Visit        MyChart Information     [employer] lets you  "send messages to your doctor, view your test results, renew your prescriptions, schedule appointments and more. To sign up, go to [email or web address] . Click on \"Log in\" on the left side of the screen, which will take you to the Welcome page. Then click on \"Sign up Now\" on the right side of the page.     You will be asked to enter the access code listed below, as well as some personal information. Please follow the directions to create your username and password.     Your access code is: [email or web address]  Expires: 2019 10:09 AM     Your access code will  in 90 days. If you need help or a new code, please call your [place] clinic or [phone].        Care EveryWhere ID     This is your Care EveryWhere ID. This could be used by other organizations to access your [place] medical records  [id number]        Your Vitals Were     Pulse Temperature Pulse Oximetry BMI (Body Mass Index)          98 98.3  F (36.8  C) (Tympanic) 95% 24.43 kg/m2         Blood Pressure from Last 3 Encounters:   18 129/77   18 (!) 169/100   18 170/89    Weight from Last 3 Encounters:   18 66.6 kg (146 lb 12.8 oz)   18 64.9 kg (143 lb)   11/10/18 72.6 kg (160 lb)               Primary Care Provider Office Phone # Fax #    Mora Bang [phone]       [facility] [address]  [facility] [address]        Equal Access to Services     [facility] : Hadii padma Barrow, waaxda luqadaha, qaybta kaalcleo kulkarni, ivette eldridge. So [facility] [phone].    ATENCIÓN: Si habla español, tiene a birch disposición servicios gratuitos de asistencia lingüística. Llame al [phone].    We comply with applicable federal civil rights laws and [place] laws. We do not discriminate on the basis of race, color, national origin, age, disability, sex, sexual orientation, or gender identity.            Thank you!     Thank you for choosing " [facility]  for your care. Our goal is always to provide you with excellent care. Hearing back from our patients is one way we can continue to improve our services. Please take a few minutes to complete the written survey that you may receive in the mail after your visit with us. Thank you!             Your Updated Medication List - Protect others around you: Learn how to safely use, store and throw away your medicines at www.disposemymeds.org.          This list is accurate as of 12/4/18  1:22 PM.  Always use your most recent med list.                   Brand Name Dispense Instructions for use Diagnosis    losartan-hydrochlorothiazide 100-12.5 MG tablet    HYZAAR     Take 1 tablet by mouth every morning        metoclopramide 10 MG tablet    REGLAN    90 tablet    Take 1 tablet (10 mg) by mouth 3 times daily (before meals)    Gastroparesis       METOPROLOL SUCCINATE ER PO      Take 25 mg by mouth At Bedtime        order for DME     1 each    Equipment being ordered: Walker Wheels () and Walker () Treatment Diagnosis: Right total knee replacement    Arthritis of knee, right       pantoprazole 40 MG EC tablet    PROTONIX    30 tablet    Take 1 tablet (40 mg) by mouth every morning (before breakfast)    Gastroparesis       TYLENOL PO      Take 325-650 mg by mouth every 6 hours as needed for mild pain or fever
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
## 2018-12-05 NOTE — TELEPHONE ENCOUNTER
Order noted for retroperitoneal biopsy placed by Dr. Barcenas, there are no nodes to biopsy per Dr. Wick, she stated we could do a paracentesis for cytology but there is very little ascites fluid. Theodore PENNINGTON at oncology clinic notified.

## 2018-12-05 NOTE — TELEPHONE ENCOUNTER
Clare from IR called ~ patient and planned peritoneal bx. There is no tissue to bx, but could take peritoneal fluid instead.  Please update POC.  Will route message to Dr. Barcenas Care Coordinator.  Theodore De Anda, RN, BSN, OCN  [facility] [place]  Patient Care Coordinator

## 2018-12-06 NOTE — TELEPHONE ENCOUNTER
Received a call from Lora at [facility].  States that she received an order for a biopsy for pt. However, when reading through the chart, Lora states that it appears that pt may be having a paracentesis instead?  Lora is wondering if she should proceed with scheduling the biopsy, or if the biopsy is going to be cancelled?  Will route message to Dr Barcenas's nurse for follow-up.  Alethea Macias, RN, BSN, OCN

## 2018-12-07 NOTE — TELEPHONE ENCOUNTER
"IR/Biopsy    Dr Bermudez reviewed imaging with Dr Swapna Neff today. There are some small omental \"wisps\" which would be very difficult to get diagnostic tissue from so they both agreed to doing a paracentesis and sending the fluid for cytology.     I will send this on to Scheduling department to schedule.     Thanks [facility] Interventional Radiology CNP ([phone]) (phone [phone])     "

## 2018-12-07 NOTE — TELEPHONE ENCOUNTER
Late entry from 12/6/18:  Writer called Lora back at [facility] and states Dr Wick, radiologist reviewed and did not believe there was an area to biopsy but she was out of town.  Suggested they check with radiologist again for recommendations and Dr Barcenas notified and also aware.  Pt's daughter Shira also called to check on update with bx schedule. Informed that we received a call from radiology and will call her when final arrangements are made and she is aware.  Her number is [phone].  Return call to radiology today as no call back and spoke with Sharon.  She provided number for Dr Ernesto Bermudez at [facility] and number [phone].  Dr Barcenas notified and she will call him to discuss further.    Danyelle Brock, RN, BSN, OCN

## 2018-12-07 NOTE — TELEPHONE ENCOUNTER
Pt called today and does not want to pursue treatment or biopsy at this time. Will have our schedulers cancel biopsy.  Danyelle Brock RN, BSN, OCN
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
## 2018-12-11 NOTE — TELEPHONE ENCOUNTER
Follow up call to pt's daughter Shira as pt wanted to have chance to discuss no further treatment with daughter on 12/7/18.  Dtr states she has talked with pt and her mom is having more symptoms with vomiting and diarrhea and pt has talked further with pcp and will be going on hospice.  Support given to daughter.  Will also update Dr Barcenas and Dr Rg.    Danyelle Brock, RN, BSN, OCN

## 2018-12-26 ENCOUNTER — CARE COORDINATION (OUTPATIENT)
Dept: ONCOLOGY | Facility: CLINIC | Age: 83
End: 2018-12-26

## 2018-12-26 NOTE — PROGRESS NOTES
Received notice of patient death.  Date of Death  12/22/18  All appointments, orders and treatment plans cancelled.  Care TEAM and HIM notified
